# Patient Record
Sex: MALE | Race: WHITE | Employment: OTHER | URBAN - METROPOLITAN AREA
[De-identification: names, ages, dates, MRNs, and addresses within clinical notes are randomized per-mention and may not be internally consistent; named-entity substitution may affect disease eponyms.]

---

## 2018-06-06 PROBLEM — R80.1 PERSISTENT PROTEINURIA: Status: ACTIVE | Noted: 2018-06-06

## 2018-06-06 PROBLEM — E78.00 PURE HYPERCHOLESTEROLEMIA: Status: ACTIVE | Noted: 2018-06-06

## 2018-06-06 PROBLEM — E87.6 HYPOKALEMIA: Status: ACTIVE | Noted: 2018-06-06

## 2018-06-06 PROBLEM — N18.30 CHRONIC KIDNEY DISEASE, STAGE III (MODERATE) (HCC): Status: ACTIVE | Noted: 2018-06-06

## 2018-06-06 PROBLEM — I63.9 CEREBROVASCULAR ACCIDENT (CVA) DUE TO EMBOLISM (HCC): Status: ACTIVE | Noted: 2018-06-06

## 2018-06-06 PROBLEM — I10 ESSENTIAL HYPERTENSION: Status: ACTIVE | Noted: 2018-06-06

## 2018-06-06 PROBLEM — Z71.41 ALCOHOL ABUSE COUNSELING AND SURVEILLANCE: Status: ACTIVE | Noted: 2018-06-06

## 2018-06-06 PROBLEM — E11.9 TYPE 2 DIABETES MELLITUS WITHOUT COMPLICATION (HCC): Status: ACTIVE | Noted: 2018-06-06

## 2018-06-18 ENCOUNTER — HOSPITAL ENCOUNTER (OUTPATIENT)
Dept: ULTRASOUND IMAGING | Age: 68
Discharge: HOME | End: 2018-06-18
Attending: INTERNAL MEDICINE | Admitting: INTERNAL MEDICINE

## 2018-06-18 DIAGNOSIS — E13.9 DIABETES MELLITUS OF OTHER TYPE WITHOUT COMPLICATION, UNSPECIFIED LONG TERM INSULIN USE STATUS: ICD-10-CM

## 2018-06-18 DIAGNOSIS — Z71.41 ALCOHOL ABUSE COUNSELING AND SURVEILLANCE: ICD-10-CM

## 2018-06-18 DIAGNOSIS — N18.30 CHRONIC KIDNEY DISEASE, STAGE III (MODERATE) (HCC): ICD-10-CM

## 2018-06-18 DIAGNOSIS — R80.1 PERSISTENT PROTEINURIA: ICD-10-CM

## 2018-06-18 DIAGNOSIS — E87.6 HYPOKALEMIA: ICD-10-CM

## 2018-06-18 DIAGNOSIS — I10 ESSENTIAL HYPERTENSION, BENIGN: ICD-10-CM

## 2018-06-18 DIAGNOSIS — E78.00 PURE HYPERCHOLESTEROLEMIA: ICD-10-CM

## 2018-06-18 DIAGNOSIS — E11.9 DIABETES MELLITUS WITHOUT COMPLICATION (HCC): ICD-10-CM

## 2018-11-26 ENCOUNTER — HOSPITAL ENCOUNTER (OUTPATIENT)
Dept: ULTRASOUND IMAGING | Age: 68
Discharge: HOME | End: 2018-11-26
Payer: MEDICARE

## 2018-11-26 DIAGNOSIS — I63.81 LACUNAR INFARCTION (HCC): ICD-10-CM

## 2018-11-26 DIAGNOSIS — Z79.01 LONG TERM (CURRENT) USE OF ANTICOAGULANTS: ICD-10-CM

## 2018-11-26 DIAGNOSIS — I66.09: ICD-10-CM

## 2018-11-26 PROCEDURE — 93880 EXTRACRANIAL BILAT STUDY: CPT

## 2019-06-26 PROBLEM — E66.812 CLASS 2 SEVERE OBESITY DUE TO EXCESS CALORIES WITH SERIOUS COMORBIDITY AND BODY MASS INDEX (BMI) OF 39.0 TO 39.9 IN ADULT: Status: ACTIVE | Noted: 2019-06-26

## 2019-06-26 PROBLEM — E66.01 CLASS 2 SEVERE OBESITY DUE TO EXCESS CALORIES WITH SERIOUS COMORBIDITY AND BODY MASS INDEX (BMI) OF 39.0 TO 39.9 IN ADULT (HCC): Status: ACTIVE | Noted: 2019-06-26

## 2019-10-28 ENCOUNTER — APPOINTMENT (OUTPATIENT)
Dept: CT IMAGING | Age: 69
End: 2019-10-28
Payer: MEDICARE

## 2019-10-28 ENCOUNTER — HOSPITAL ENCOUNTER (EMERGENCY)
Age: 69
Discharge: HOME | End: 2019-10-28
Attending: EMERGENCY MEDICINE
Payer: MEDICARE

## 2019-10-28 VITALS
HEIGHT: 70 IN | SYSTOLIC BLOOD PRESSURE: 154 MMHG | TEMPERATURE: 98.6 F | OXYGEN SATURATION: 95 % | BODY MASS INDEX: 37.51 KG/M2 | DIASTOLIC BLOOD PRESSURE: 96 MMHG | HEART RATE: 87 BPM | WEIGHT: 262 LBS | RESPIRATION RATE: 17 BRPM
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
## 2019-10-28 DIAGNOSIS — S16.1XXA STRAIN OF NECK MUSCLE, INITIAL ENCOUNTER: ICD-10-CM

## 2019-10-28 PROCEDURE — 6370000000 HC RX 637 (ALT 250 FOR IP)

## 2019-10-28 PROCEDURE — 72125 CT NECK SPINE W/O DYE: CPT

## 2019-10-28 PROCEDURE — 99284 EMERGENCY DEPT VISIT MOD MDM: CPT

## 2019-10-28 PROCEDURE — 6370000000 HC RX 637 (ALT 250 FOR IP): Performed by: EMERGENCY MEDICINE

## 2019-10-28 RX ORDER — ACETAMINOPHEN 500 MG
1000 TABLET ORAL ONCE
Status: COMPLETED | OUTPATIENT
Start: 2019-10-28 | End: 2019-10-28

## 2019-10-28 RX ORDER — ACETAMINOPHEN 500 MG
TABLET ORAL
Status: COMPLETED
Start: 2019-10-28 | End: 2019-10-28

## 2019-10-28 RX ADMIN — ACETAMINOPHEN 1000 MG: 500 TABLET ORAL at 12:00

## 2019-10-28 ASSESSMENT — PAIN SCALES - GENERAL: PAINLEVEL_OUTOF10: 9

## 2020-10-19 ENCOUNTER — HOSPITAL ENCOUNTER (OUTPATIENT)
Age: 70
Discharge: HOME | End: 2020-10-19
Payer: MEDICARE

## 2020-10-19 LAB
ALBUMIN SERPL-MCNC: 3.7 GM/DL (ref 3.4–5)
ANION GAP SERPL CALCULATED.3IONS-SCNC: 9 MMOL/L (ref 4–16)
BUN BLDV-MCNC: 21 MG/DL (ref 6–23)
CALCIUM SERPL-MCNC: 9 MG/DL (ref 8.3–10.6)
CHLORIDE BLD-SCNC: 90 MMOL/L (ref 99–110)
CO2: 36 MMOL/L (ref 21–32)
CREAT SERPL-MCNC: 2 MG/DL (ref 0.9–1.3)
CREATININE URINE: 74.9 MG/DL (ref 39–259)
GFR AFRICAN AMERICAN: 40 ML/MIN/1.73M2
GFR NON-AFRICAN AMERICAN: 33 ML/MIN/1.73M2
GLUCOSE BLD-MCNC: 123 MG/DL (ref 70–99)
MAGNESIUM: 2 MG/DL (ref 1.8–2.4)
PHOSPHORUS: 2.8 MG/DL (ref 2.5–4.9)
POTASSIUM SERPL-SCNC: 3.5 MMOL/L (ref 3.5–5.1)
PROT/CREAT RATIO, UR: 0.4
SODIUM BLD-SCNC: 135 MMOL/L (ref 135–145)
URINE TOTAL PROTEIN: 31.7 MG/DL

## 2020-10-19 PROCEDURE — 84100 ASSAY OF PHOSPHORUS: CPT

## 2020-10-19 PROCEDURE — 82570 ASSAY OF URINE CREATININE: CPT

## 2020-10-19 PROCEDURE — 83735 ASSAY OF MAGNESIUM: CPT

## 2020-10-19 PROCEDURE — 84156 ASSAY OF PROTEIN URINE: CPT

## 2020-10-19 PROCEDURE — 82040 ASSAY OF SERUM ALBUMIN: CPT

## 2020-10-19 PROCEDURE — 36415 COLL VENOUS BLD VENIPUNCTURE: CPT

## 2020-10-19 PROCEDURE — 80048 BASIC METABOLIC PNL TOTAL CA: CPT

## 2021-03-13 ENCOUNTER — HOSPITAL ENCOUNTER (EMERGENCY)
Age: 71
Discharge: ANOTHER HOSPITAL | End: 2021-03-13
Attending: EMERGENCY MEDICINE
Payer: MEDICARE

## 2021-03-13 ENCOUNTER — APPOINTMENT (OUTPATIENT)
Dept: CT IMAGING | Age: 71
End: 2021-03-13
Payer: MEDICARE

## 2021-03-13 ENCOUNTER — APPOINTMENT (OUTPATIENT)
Dept: GENERAL RADIOLOGY | Age: 71
End: 2021-03-13
Payer: MEDICARE

## 2021-03-13 VITALS
DIASTOLIC BLOOD PRESSURE: 82 MMHG | HEART RATE: 86 BPM | BODY MASS INDEX: 40.88 KG/M2 | TEMPERATURE: 98.3 F | HEIGHT: 69 IN | WEIGHT: 276 LBS | SYSTOLIC BLOOD PRESSURE: 147 MMHG | OXYGEN SATURATION: 98 % | RESPIRATION RATE: 25 BRPM

## 2021-03-13 DIAGNOSIS — R93.89 ABNORMAL CT SCAN: ICD-10-CM

## 2021-03-13 DIAGNOSIS — R09.02 HYPOXIA: ICD-10-CM

## 2021-03-13 DIAGNOSIS — R77.8 TROPONIN LEVEL ELEVATED: ICD-10-CM

## 2021-03-13 DIAGNOSIS — R18.8 CIRRHOSIS OF LIVER WITH ASCITES, UNSPECIFIED HEPATIC CIRRHOSIS TYPE (HCC): ICD-10-CM

## 2021-03-13 DIAGNOSIS — K80.20 CALCULUS OF GALLBLADDER WITHOUT CHOLECYSTITIS WITHOUT OBSTRUCTION: ICD-10-CM

## 2021-03-13 DIAGNOSIS — R06.89 DYSPNEA AND RESPIRATORY ABNORMALITIES: ICD-10-CM

## 2021-03-13 DIAGNOSIS — R79.89 ELEVATED BRAIN NATRIURETIC PEPTIDE (BNP) LEVEL: ICD-10-CM

## 2021-03-13 DIAGNOSIS — R06.00 DYSPNEA AND RESPIRATORY ABNORMALITIES: ICD-10-CM

## 2021-03-13 DIAGNOSIS — K74.60 CIRRHOSIS OF LIVER WITH ASCITES, UNSPECIFIED HEPATIC CIRRHOSIS TYPE (HCC): ICD-10-CM

## 2021-03-13 DIAGNOSIS — R79.1 SUBTHERAPEUTIC INTERNATIONAL NORMALIZED RATIO (INR): ICD-10-CM

## 2021-03-13 DIAGNOSIS — R14.0 ABDOMINAL DISTENSION: ICD-10-CM

## 2021-03-13 DIAGNOSIS — M79.89 LEG SWELLING: Primary | ICD-10-CM

## 2021-03-13 DIAGNOSIS — R79.89 SERUM CREATININE RAISED: ICD-10-CM

## 2021-03-13 DIAGNOSIS — J90 PLEURAL EFFUSION: ICD-10-CM

## 2021-03-13 DIAGNOSIS — I50.9 CONGESTIVE HEART FAILURE, UNSPECIFIED HF CHRONICITY, UNSPECIFIED HEART FAILURE TYPE (HCC): ICD-10-CM

## 2021-03-13 LAB
ALBUMIN SERPL-MCNC: 3.9 GM/DL (ref 3.4–5)
ALP BLD-CCNC: 125 IU/L (ref 40–129)
ALT SERPL-CCNC: 19 U/L (ref 10–40)
ANION GAP SERPL CALCULATED.3IONS-SCNC: 11 MMOL/L (ref 4–16)
APTT: 40.2 SECONDS (ref 25.1–37.1)
AST SERPL-CCNC: 35 IU/L (ref 15–37)
BACTERIA: NEGATIVE /HPF
BASE EXCESS MIXED: 0.6 (ref 0–1.2)
BASE EXCESS: ABNORMAL (ref 0–3.3)
BASOPHILS ABSOLUTE: 0 K/CU MM
BASOPHILS RELATIVE PERCENT: 0.4 % (ref 0–1)
BILIRUB SERPL-MCNC: 1.1 MG/DL (ref 0–1)
BILIRUBIN URINE: NEGATIVE MG/DL
BLOOD, URINE: NEGATIVE
BUN BLDV-MCNC: 16 MG/DL (ref 6–23)
CALCIUM SERPL-MCNC: 9.1 MG/DL (ref 8.3–10.6)
CAST TYPE: NORMAL /HPF
CHLORIDE BLD-SCNC: 100 MMOL/L (ref 99–110)
CLARITY: CLEAR
CO2: 27 MMOL/L (ref 21–32)
CO2: 28 MMOL/L (ref 21–32)
COLOR: YELLOW
CREAT SERPL-MCNC: 1.6 MG/DL (ref 0.9–1.3)
CRYSTAL TYPE: NEGATIVE /HPF
DIFFERENTIAL TYPE: ABNORMAL
EOSINOPHILS ABSOLUTE: 0.2 K/CU MM
EOSINOPHILS RELATIVE PERCENT: 1.5 % (ref 0–3)
EPITHELIAL CELLS, UA: 2 /HPF
GFR AFRICAN AMERICAN: 52 ML/MIN/1.73M2
GFR NON-AFRICAN AMERICAN: 43 ML/MIN/1.73M2
GLUCOSE BLD-MCNC: 111 MG/DL (ref 70–99)
GLUCOSE BLD-MCNC: 112 MG/DL (ref 70–99)
GLUCOSE, URINE: NEGATIVE MG/DL
HCO3 ARTERIAL: 26.1 MMOL/L (ref 18–23)
HCT VFR BLD CALC: 40.1 % (ref 42–52)
HCT VFR BLD CALC: 41 % (ref 42–52)
HEMOGLOBIN: 12.7 GM/DL (ref 13.5–18)
HEMOGLOBIN: 13.8 GM/DL (ref 13.5–18)
HYPOCHROMIA: ABNORMAL
IMMATURE NEUTROPHIL %: 0.4 % (ref 0–0.43)
INR BLD: 1.27 INDEX
KETONES, URINE: NEGATIVE MG/DL
LACTATE: 1.9 MMOL/L (ref 0.4–2)
LEUKOCYTE ESTERASE, URINE: NEGATIVE
LIPASE: 60 IU/L (ref 13–60)
LYMPHOCYTES ABSOLUTE: 2.7 K/CU MM
LYMPHOCYTES RELATIVE PERCENT: 25.6 % (ref 24–44)
MAGNESIUM: 1.8 MG/DL (ref 1.8–2.4)
MCH RBC QN AUTO: 34.9 PG (ref 27–31)
MCHC RBC AUTO-ENTMCNC: 31.7 % (ref 32–36)
MCV RBC AUTO: 110.2 FL (ref 78–100)
MONOCYTES ABSOLUTE: 0.8 K/CU MM
MONOCYTES RELATIVE PERCENT: 8 % (ref 0–4)
NITRITE URINE, QUANTITATIVE: NEGATIVE
O2 SATURATION: 85.9 % (ref 96–97)
PCO2 ARTERIAL: 49.7 MMHG (ref 32–45)
PDW BLD-RTO: 16.7 % (ref 11.7–14.9)
PH BLOOD: 7.33 (ref 7.34–7.45)
PH, URINE: 5 (ref 5–8)
PLATELET # BLD: 189 K/CU MM (ref 140–440)
PMV BLD AUTO: 9.6 FL (ref 7.5–11.1)
PO2 ARTERIAL: 55.6 MMHG (ref 75–100)
POC CALCIUM: 1.1 MMOL/L (ref 1.12–1.32)
POC CHLORIDE: 99 MMOL/L (ref 98–109)
POC CREATININE: 1.6 MG/DL (ref 0.9–1.3)
POLYCHROMASIA: ABNORMAL
POTASSIUM SERPL-SCNC: 3.9 MMOL/L (ref 3.5–4.5)
POTASSIUM SERPL-SCNC: 4 MMOL/L (ref 3.5–5.1)
PRO-BNP: 1002 PG/ML
PROTEIN UA: NEGATIVE MG/DL
PROTHROMBIN TIME: 15.7 SECONDS (ref 11.7–14.5)
RBC # BLD: 3.64 M/CU MM (ref 4.6–6.2)
RBC URINE: 0 /HPF (ref 0–3)
SEGMENTED NEUTROPHILS ABSOLUTE COUNT: 6.9 K/CU MM
SEGMENTED NEUTROPHILS RELATIVE PERCENT: 64.1 % (ref 36–66)
SODIUM BLD-SCNC: 138 MMOL/L (ref 135–145)
SODIUM BLD-SCNC: 140 MMOL/L (ref 138–146)
SOURCE, BLOOD GAS: ABNORMAL
SPECIFIC GRAVITY UA: 1.01 (ref 1–1.03)
TOTAL IMMATURE NEUTOROPHIL: 0.04 K/CU MM
TOTAL PROTEIN: 8 GM/DL (ref 6.4–8.2)
TROPONIN T: 0.03 NG/ML
TSH HIGH SENSITIVITY: 1.3 UIU/ML (ref 0.27–4.2)
UROBILINOGEN, URINE: 0.2 MG/DL (ref 0.2–1)
WBC # BLD: 10.6 K/CU MM (ref 4–10.5)
WBC UA: 1 /HPF (ref 0–2)

## 2021-03-13 PROCEDURE — 84484 ASSAY OF TROPONIN QUANT: CPT

## 2021-03-13 PROCEDURE — 71275 CT ANGIOGRAPHY CHEST: CPT

## 2021-03-13 PROCEDURE — 82805 BLOOD GASES W/O2 SATURATION: CPT

## 2021-03-13 PROCEDURE — 84443 ASSAY THYROID STIM HORMONE: CPT

## 2021-03-13 PROCEDURE — 87086 URINE CULTURE/COLONY COUNT: CPT

## 2021-03-13 PROCEDURE — 6360000002 HC RX W HCPCS: Performed by: EMERGENCY MEDICINE

## 2021-03-13 PROCEDURE — 83880 ASSAY OF NATRIURETIC PEPTIDE: CPT

## 2021-03-13 PROCEDURE — 87186 SC STD MICRODIL/AGAR DIL: CPT

## 2021-03-13 PROCEDURE — 83605 ASSAY OF LACTIC ACID: CPT

## 2021-03-13 PROCEDURE — 96374 THER/PROPH/DIAG INJ IV PUSH: CPT

## 2021-03-13 PROCEDURE — 83735 ASSAY OF MAGNESIUM: CPT

## 2021-03-13 PROCEDURE — 87077 CULTURE AEROBIC IDENTIFY: CPT

## 2021-03-13 PROCEDURE — 81001 URINALYSIS AUTO W/SCOPE: CPT

## 2021-03-13 PROCEDURE — 71045 X-RAY EXAM CHEST 1 VIEW: CPT

## 2021-03-13 PROCEDURE — 93005 ELECTROCARDIOGRAM TRACING: CPT | Performed by: EMERGENCY MEDICINE

## 2021-03-13 PROCEDURE — 87040 BLOOD CULTURE FOR BACTERIA: CPT

## 2021-03-13 PROCEDURE — 85610 PROTHROMBIN TIME: CPT

## 2021-03-13 PROCEDURE — 6360000004 HC RX CONTRAST MEDICATION: Performed by: EMERGENCY MEDICINE

## 2021-03-13 PROCEDURE — 85025 COMPLETE CBC W/AUTO DIFF WBC: CPT

## 2021-03-13 PROCEDURE — 93010 ELECTROCARDIOGRAM REPORT: CPT | Performed by: INTERNAL MEDICINE

## 2021-03-13 PROCEDURE — 85730 THROMBOPLASTIN TIME PARTIAL: CPT

## 2021-03-13 PROCEDURE — 80053 COMPREHEN METABOLIC PANEL: CPT

## 2021-03-13 PROCEDURE — 83690 ASSAY OF LIPASE: CPT

## 2021-03-13 PROCEDURE — 99285 EMERGENCY DEPT VISIT HI MDM: CPT

## 2021-03-13 RX ORDER — FUROSEMIDE 10 MG/ML
20 INJECTION INTRAMUSCULAR; INTRAVENOUS ONCE
Status: COMPLETED | OUTPATIENT
Start: 2021-03-13 | End: 2021-03-13

## 2021-03-13 RX ADMIN — IOPAMIDOL 75 ML: 755 INJECTION, SOLUTION INTRAVENOUS at 14:06

## 2021-03-13 RX ADMIN — FUROSEMIDE 20 MG: 10 INJECTION, SOLUTION INTRAVENOUS at 15:11

## 2021-03-13 ASSESSMENT — PAIN SCALES - GENERAL: PAINLEVEL_OUTOF10: 5

## 2021-03-13 ASSESSMENT — ENCOUNTER SYMPTOMS
ABDOMINAL DISTENTION: 1
ALLERGIC/IMMUNOLOGIC NEGATIVE: 1
COUGH: 1
EYES NEGATIVE: 1
DIARRHEA: 1
SHORTNESS OF BREATH: 1

## 2021-03-13 ASSESSMENT — PAIN DESCRIPTION - LOCATION: LOCATION: ABDOMEN

## 2021-03-13 NOTE — ED PROVIDER NOTES
[address] [facility] [address]      TRIAGE CHIEF COMPLAINT:   Shortness of Breath and Leg Swelling (BILAT 4-5 DAYS)      [place]:  Geno Hope is a 72 y.o. male that presents with complaint of shortness of breath peripheral edema abdominal distention. Patient's had worsening symptoms for the past few months. Came in today he is hypoxic requiring oxygen. History of strokes he is on Coumadin. Denies any chest pain or abdominal pain just worsening distention bilateral leg swelling short of breath congestion. History of obesity, hypertension, diabetes type 2, chronic kidney disease stage III. Has had decreased urination as well. Follows up with nephrology, urology for kidney, prostate issues. Denies any other questions or concerns. No history of CHF or cirrhosis he states. He was a heavy drinker for a long time recently quit. REVIEW OF SYSTEMS:  At least 10 systems reviewed and otherwise acutely negative except as in the [address] Review of Systems   Constitutional: Positive for activity change and fatigue. HENT: Negative. Eyes: Negative. Respiratory: Positive for cough and shortness of breath. Cardiovascular: Positive for leg swelling. Gastrointestinal: Positive for abdominal distention and diarrhea. Endocrine: Negative. Genitourinary: Positive for decreased urine volume and difficulty urinating. Musculoskeletal: Negative. Skin: Negative. Allergic/Immunologic: Negative. Neurological: Negative. Hematological: Negative. Psychiatric/Behavioral: Negative. All other systems reviewed and are negative.       Past Medical History:   Diagnosis Date    Diabetes mellitus ([address]     Hypertension     Unspecified cerebral artery occlusion with cerebral infarction      Past Surgical History:   Procedure Laterality Date    ACHILLES TENDON SURGERY       Family History   Problem Relation Age of Onset    Diabetes Mother     Cancer Mother     Heart Disease Father    Tobi Cancer Father      Social History     Socioeconomic History    Marital status:      Spouse name: Not on file    Number of children: Not on file    Years of education: Not on file    Highest education level: Not on file   Occupational History    Not on file   Social Needs    Financial resource strain: Not on file    Food insecurity     Worry: Not on file     Inability: Not on file    Transportation needs     Medical: Not on file     Non-medical: Not on file   Tobacco Use    Smoking status: Never Smoker    Smokeless tobacco: Never Used   Substance and Sexual Activity    Alcohol use:  Yes     Alcohol/week: 2.0 standard drinks     Types: 2 Glasses of wine per week    Drug use: No    Sexual activity: Yes     Partners: Female   Lifestyle    Physical activity     Days per week: Not on file     Minutes per session: Not on file    Stress: Not on file   Relationships    Social connections     Talks on phone: Not on file     Gets together: Not on file     Attends Jewish service: Not on file     Active member of club or organization: Not on file     Attends meetings of clubs or organizations: Not on file     Relationship status: Not on file    Intimate partner violence     Fear of current or ex partner: Not on file     Emotionally abused: Not on file     Physically abused: Not on file     Forced sexual activity: Not on file   Other Topics Concern    Not on file   Social History Narrative    Not on file     Current Facility-Administered Medications   Medication Dose Route Frequency Provider Last Rate Last Admin    vancomycin (VANCOCIN) 2,000 mg in dextrose 5 % 500 mL IVPB  2,000 mg Intravenous Once Alannahdicalvin Pete, DO        meropenem (MERREM) 1,000 mg in sodium chloride 0.9 % 100 mL IVPB (mini-bag)  1,000 mg Intravenous Q8H Kishore Pete, DO         Current Outpatient Medications   Medication Sig Dispense Refill    furosemide (LASIX) 20 MG tablet Take 1 tablet by mouth daily 30 tablet 3    furosemide (LASIX) 20 MG tablet TAKE ONE TABLET BY MOUTH DAILY ON MONDAYS, WEDNESDAYS, AND FRIDAYS 36 tablet 3    venlafaxine (EFFEXOR) 75 MG tablet Take 75 mg by mouth daily      tamsulosin (FLOMAX) 0.4 MG capsule Take 0.4 mg by mouth daily      potassium chloride (KLOR-CON M) 10 MEQ extended release tablet Take 1 tablet by mouth 2 times daily for 5 days (Patient taking differently: Take 10 mEq by mouth 2 times daily as needed When takes wated pill) 10 tablet 3    glyBURIDE (DIABETA) 5 MG tablet Take 5 mg by mouth daily (with breakfast)      Coenzyme Q10 (CO Q 10) 100 MG CAPS Take 1 capsule by mouth daily      rosuvastatin (CRESTOR) 10 MG tablet Take 10 mg by mouth daily      naltrexone (DEPADE) 50 MG tablet Take 50 mg by mouth daily      albuterol sulfate  (90 Base) MCG/ACT inhaler Inhale 2 puffs into the lungs every 6 hours as needed for Wheezing      Cholecalciferol (VITAMIN D3) 2000 units CAPS Take 3 capsules by mouth daily      lisinopril-hydrochlorothiazide (ZESTORETIC) 20-25 MG per tablet Take 1 tablet by mouth daily      warfarin (COUMADIN) 5 MG tablet Take 5 mg by mouth 1/2 mon and thursdays      aspirin 81 MG EC tablet Take 81 mg by mouth daily.  fish oil-omega-3 fatty acids 1000 MG capsule Take 2 g by mouth daily.           Allergies   Allergen Reactions    Pcn [Penicillins] Other (See Comments)     \"freezing up\"    Strawberry Extract     Tetanus Toxoids     Tetracyclines & Related Hives     Current Facility-Administered Medications   Medication Dose Route Frequency Provider Last Rate Last Admin    vancomycin (VANCOCIN) 2,000 mg in dextrose 5 % 500 mL IVPB  2,000 mg Intravenous Once [employer], DO        meropenem (MERREM) 1,000 mg in sodium chloride 0.9 % 100 mL IVPB (mini-bag)  1,000 mg Intravenous Q8H [employer], DO         Current Outpatient Medications   Medication Sig Dispense Refill    furosemide (LASIX) 20 MG tablet Take 1 tablet by mouth daily 30 tablet External ear normal.      Left Ear: External ear normal.      Nose: Congestion present. No rhinorrhea. Eyes:      General: No scleral icterus. Right eye: No discharge. Left eye: No discharge. Extraocular Movements: Extraocular movements intact. Conjunctiva/sclera: Conjunctivae normal.   Neck:      Musculoskeletal: Full passive range of motion without pain and normal range of motion. Normal range of motion. No edema, erythema, neck rigidity, crepitus, injury, pain with movement or torticollis. Vascular: No JVD. Trachea: Phonation normal.   Cardiovascular:      Rate and Rhythm: Regular rhythm. Tachycardia present. Pulses: Normal pulses. Heart sounds: Normal heart sounds. No murmur. No friction rub. No gallop. Pulmonary:      Effort: Pulmonary effort is normal. No respiratory distress. Breath sounds: No stridor. Rales present. No wheezing or rhonchi. Abdominal:      General: Bowel sounds are normal. There is distension. There are no signs of injury. Palpations: Abdomen is soft. There is no mass or pulsatile mass. Tenderness: There is no abdominal tenderness. There is no guarding or rebound. Negative signs include Salamanca's sign, Rovsing's sign and McBurney's sign. Hernia: No hernia is present. Musculoskeletal: Normal range of motion. General: Swelling present. No tenderness, deformity or signs of injury. Right lower leg: Edema present. Left lower leg: Edema present. Skin:     General: Skin is warm. Coloration: Skin is not jaundiced or pale. Findings: No bruising, erythema, lesion or rash. Neurological:      General: No focal deficit present. Mental Status: He is alert and oriented to person, place, and time. GCS: GCS eye subscore is 4. GCS verbal subscore is 5. GCS motor subscore is 6. Cranial Nerves: Cranial nerves are intact. No cranial nerve deficit, dysarthria or facial asymmetry.       Sensory: Sensation is intact. No sensory deficit. Motor: Motor function is intact. No weakness, atrophy or seizure activity. Coordination: Coordination normal.   Psychiatric:         Mood and Affect: Mood normal.         Behavior: Behavior normal. Behavior is cooperative. Thought Content:  Thought content normal.         Judgment: Judgment normal.           I have reviewed andinterpreted all of the currently available lab results from this visit (if applicable):    Results for orders placed or performed during the hospital encounter of 03/13/21   CBC Auto Differential   Result Value Ref Range    WBC 10.6 (H) 4.0 - 10.5 K/CU MM    RBC 3.64 (L) 4.6 - 6.2 M/CU MM    Hemoglobin 12.7 (L) 13.5 - 18.0 GM/DL    Hematocrit 40.1 (L) 42 - 52 %    .2 (H) 78 - 100 FL    MCH 34.9 (H) 27 - 31 PG    MCHC 31.7 (L) 32.0 - 36.0 %    RDW 16.7 (H) 11.7 - 14.9 %    Platelets 423 678 - 639 K/CU MM    MPV 9.6 7.5 - 11.1 FL    Differential Type AUTOMATED DIFFERENTIAL     Segs Relative 64.1 36 - 66 %    Lymphocytes % 25.6 24 - 44 %    Monocytes % 8.0 (H) 0 - 4 %    Eosinophils % 1.5 0 - 3 %    Basophils % 0.4 0 - 1 %    Segs Absolute 6.9 K/CU MM    Lymphocytes Absolute 2.7 K/CU MM    Monocytes Absolute 0.8 K/CU MM    Eosinophils Absolute 0.2 K/CU MM    Basophils Absolute 0.0 K/CU MM    Immature Neutrophil % 0.4 0 - 0.43 %    Total Immature Neutrophil 0.04 K/CU MM    Hypochromia 1+     Polychromasia 1+    CMP   Result Value Ref Range    Sodium 138 135 - 145 MMOL/L    Potassium 4.0 3.5 - 5.1 MMOL/L    Chloride 100 99 - 110 mMol/L    CO2 27 21 - 32 MMOL/L    BUN 16 6 - 23 MG/DL    CREATININE 1.6 (H) 0.9 - 1.3 MG/DL    Glucose 111 (H) 70 - 99 MG/DL    Calcium 9.1 8.3 - 10.6 MG/DL    Albumin 3.9 3.4 - 5.0 GM/DL    Total Protein 8.0 6.4 - 8.2 GM/DL    Total Bilirubin 1.1 (H) 0.0 - 1.0 MG/DL    ALT 19 10 - 40 U/L    AST 35 15 - 37 IU/L    Alkaline Phosphatase 125 40 - 129 IU/L    GFR Non- 43 (L) >60 mL/min/1.73m2 GFR African American 52 (L) >60 mL/min/1.73m2    Anion Gap 11 4 - 16   Troponin   Result Value Ref Range    Troponin T 0.029 (H) <0.01 NG/ML   Lactic Acid, Plasma   Result Value Ref Range    Lactate 1.9 0.4 - 2.0 mMOL/L   Protime/INR & PTT   Result Value Ref Range    Protime 15.7 (H) 11.7 - 14.5 SECONDS    INR 1.27 INDEX    aPTT 40.2 (H) 25.1 - 37.1 SECONDS   Brain Natriuretic Peptide   Result Value Ref Range    Pro-BNP 1,002 (H) <300 PG/ML   Lipase   Result Value Ref Range    Lipase 60 13 - 60 IU/L   TSH without Reflex   Result Value Ref Range    TSH, High Sensitivity 1.300 0.270 - 4.20 uIu/ml   Magnesium   Result Value Ref Range    Magnesium 1.8 1.8 - 2.4 mg/dl   Urinalysis   Result Value Ref Range    Color, UA YELLOW YELLOW    Clarity, UA CLEAR CLEAR    Glucose, Urine NEGATIVE NEGATIVE MG/DL    Bilirubin Urine NEGATIVE NEGATIVE MG/DL    Ketones, Urine NEGATIVE NEGATIVE MG/DL    Specific Gravity, UA 1.010 1.001 - 1.035    Blood, Urine NEGATIVE NEGATIVE    pH, Urine 5.0 5.0 - 8.0    Protein, UA NEGATIVE NEGATIVE MG/DL    Urobilinogen, Urine 0.2 0.2 - 1.0 MG/DL    Nitrite Urine, Quantitative NEGATIVE NEGATIVE    Leukocyte Esterase, Urine NEGATIVE NEGATIVE    RBC, UA 0 0 - 3 /HPF    WBC, UA 1 0 - 2 /HPF    Epithelial Cells, UA 2 /HPF    Cast Type NO CAST FORMS SEEN NO CAST FORMS SEEN /HPF    Bacteria, UA NEGATIVE NEGATIVE /HPF    Crystal Type NEGATIVE NEGATIVE /HPF   POC CRITICAL CARE PROFILE   Result Value Ref Range    pH, Bld 7.33 (L) 7.34 - 7.45    pCO2, Arterial 49.7 (H) 32 - 45 MMHG    pO2, Arterial 55.6 (L) 75 - 100 MMHG    HCO3, Arterial 26.1 (H) 18 - 23 MMOL/L    Base Excess MINUS 0 - 3.3    Base Exc, Mixed 0.6 0 - 1.2    O2 Sat 85.9 (L) 96 - 97 %    CO2 28 21 - 32 MMOL/L    Sodium 140 138 - 146 MMOL/L    Potassium 3.9 3.5 - 4.5 MMOL/L    POC CALCIUM 1.10 (L) 1.12 - 1.32 MMOL/L    POC Glucose 112 (H) 70 - 99 MG/DL    Hematocrit 41.0 (L) 42 - 52 %    Hemoglobin 13.8 13.5 - 18.0 GM/DL    POC Chloride 99 98 - 109 MMOL/L    POC Creatinine 1.6 (H) 0.9 - 1.3 MG/DL    Source: Venous    EKG 12 Lead   Result Value Ref Range    Ventricular Rate 92 BPM    Atrial Rate 92 BPM    P-R Interval 188 ms    QRS Duration 132 ms    Q-T Interval 388 ms    QTc Calculation (Bazett) 479 ms    P Axis 33 degrees    R Axis 54 degrees    T Axis 3 degrees    Diagnosis       Sinus rhythm with premature supraventricular complexes and with occasional premature ventricular complexes  Nonspecific intraventricular block  Nonspecific T wave abnormality  Abnormal ECG  No previous ECGs available  Confirmed by Richa Stiles MD, Zeus Ybarra ([id number]) on 3/13/2021 3:51:55 PM          Radiographs (if obtained):  [] The following radiograph was interpreted by myself in the absence of a radiologist:  [x] Radiologist's Report Reviewed:    CXR, CTA CAP    EKG (if obtained): (All EKG's are interpreted by myself in the absence of a cardiologist)    12 lead EKG per my interpretation:  Normal Sinus Rhythm 92 PVC's  Axis is   Normal  QTc is  479  There is no specific T wave changes appreciated. There is no specific ST wave changes appreciated. Prior EKG to compare with was not available     Total critical care time today provided was at least 38 minutes. This excludes seperately billable procedure. Critical care time provided for reviewing labs, images, giving oxygen, giving lasix consulting medicine that required close evaluation and/or intervention with concern for patient decompensation. MDM:    Patient here with worsening bilateral leg swelling shortness of breath abdominal distention for the last few months. Again he has a history of hypertension type 2 diabetes chronic kidney disease stage III had been drinking, enlarged prostate. He does follow with nephrology urology and medicine he is on Coumadin for history of stroke he is on lisinopril, Lasix all of which she has been taking.   On arrival he was tachycardic hypoxic 88% he is requiring oxygen which he does not home.  He was a heavy drinker recently quit he denies history of cirrhosis or ascites or CHF. Patient has no chest pain or abdominal pain just distention. Did perform work-up as he is ill his INR subtherapeutic so I am concerned about PE, CHF cirrhosis renal failure sepsis will get labs imaging culture patient will need admission he is preferring to go to [facility] for work-up of form patient does have ascites pleural effusions likely CHF subtherapeutic INR slight elevation troponin chronic kidney disease gallstones but no definite signs of cholecystitis possible cystitis. Patient likely has CHF I do not think he has pneumonia no white count no fever I do not think he has Covid I did wear 95 mask and eye protection. Patient be given Lasix for CHF he is doing better with oxygen his blood pressure has improved to 893 systolic by itself. He is requesting go to outside hospital I did talk to hospitalist at outside facility excepted transfer awaiting bed, transport. Otherwise patient stable. Also has cirrhosis. CLINICAL IMPRESSION:  Final diagnoses:   Leg swelling   Dyspnea and respiratory abnormalities   Abdominal distension   Hypoxia   Elevated brain natriuretic peptide (BNP) level   Serum creatinine raised   Troponin level elevated   Subtherapeutic international normalized ratio (INR)   Calculus of gallbladder without cholecystitis without obstruction   Abnormal CT scan   Cirrhosis of liver with ascites, unspecified hepatic cirrhosis type (HCC)   Congestive heart failure, unspecified HF chronicity, unspecified heart failure type (HCC)   Pleural effusion       (Please note that portions of this note may have been completed with a voice recognition program. Efforts were made to edit the dictations but occasionally words aremis-transcribed.)    DISPOSITION REFERRAL (if applicable):  No follow-up provider specified.     DISPOSITION MEDICATIONS (if applicable):  New Prescriptions    No medications on file Keila Ramos, DO Keila Ramos,   03/13/21 [id number]

## 2021-03-15 LAB
CULTURE: ABNORMAL
CULTURE: ABNORMAL
Lab: ABNORMAL
SPECIMEN: ABNORMAL

## 2021-03-16 LAB
EKG ATRIAL RATE: 92 BPM
EKG DIAGNOSIS: NORMAL
EKG P AXIS: 33 DEGREES
EKG P-R INTERVAL: 188 MS
EKG Q-T INTERVAL: 388 MS
EKG QRS DURATION: 132 MS
EKG QTC CALCULATION (BAZETT): 479 MS
EKG R AXIS: 54 DEGREES
EKG T AXIS: 3 DEGREES
EKG VENTRICULAR RATE: 92 BPM

## 2021-03-18 LAB
CULTURE: NORMAL
CULTURE: NORMAL
Lab: NORMAL
Lab: NORMAL
SPECIMEN: NORMAL
SPECIMEN: NORMAL

## 2021-03-22 ENCOUNTER — HOSPITAL ENCOUNTER (EMERGENCY)
Age: 71
Discharge: ANOTHER HOSPITAL | End: 2021-03-22
Attending: EMERGENCY MEDICINE
Payer: MEDICARE

## 2021-03-22 ENCOUNTER — APPOINTMENT (OUTPATIENT)
Dept: GENERAL RADIOLOGY | Age: 71
End: 2021-03-22
Payer: MEDICARE

## 2021-03-22 VITALS
OXYGEN SATURATION: 97 % | WEIGHT: 270 LBS | HEART RATE: 98 BPM | TEMPERATURE: 98 F | HEIGHT: 69 IN | BODY MASS INDEX: 39.99 KG/M2 | RESPIRATION RATE: 31 BRPM | DIASTOLIC BLOOD PRESSURE: 59 MMHG | SYSTOLIC BLOOD PRESSURE: 102 MMHG

## 2021-03-22 DIAGNOSIS — N17.9 AKI (ACUTE KIDNEY INJURY) (HCC): ICD-10-CM

## 2021-03-22 DIAGNOSIS — J96.01 ACUTE RESPIRATORY FAILURE WITH HYPOXIA (HCC): Primary | ICD-10-CM

## 2021-03-22 DIAGNOSIS — I51.7 CARDIOMEGALY: ICD-10-CM

## 2021-03-22 DIAGNOSIS — E87.6 HYPOKALEMIA: ICD-10-CM

## 2021-03-22 DIAGNOSIS — I21.4 NSTEMI (NON-ST ELEVATED MYOCARDIAL INFARCTION) (HCC): ICD-10-CM

## 2021-03-22 DIAGNOSIS — I50.9 ACUTE ON CHRONIC CONGESTIVE HEART FAILURE, UNSPECIFIED HEART FAILURE TYPE (HCC): ICD-10-CM

## 2021-03-22 LAB
ALBUMIN SERPL-MCNC: 3.5 GM/DL (ref 3.4–5)
ALP BLD-CCNC: 96 IU/L (ref 40–129)
ALT SERPL-CCNC: 12 U/L (ref 10–40)
ANION GAP SERPL CALCULATED.3IONS-SCNC: 14 MMOL/L (ref 4–16)
APTT: 36.3 SECONDS (ref 25.1–37.1)
AST SERPL-CCNC: 31 IU/L (ref 15–37)
BASOPHILS ABSOLUTE: 0 K/CU MM
BASOPHILS RELATIVE PERCENT: 0.2 % (ref 0–1)
BILIRUB SERPL-MCNC: 0.7 MG/DL (ref 0–1)
BUN BLDV-MCNC: 20 MG/DL (ref 6–23)
CALCIUM SERPL-MCNC: 8.8 MG/DL (ref 8.3–10.6)
CHLORIDE BLD-SCNC: 79 MMOL/L (ref 99–110)
CHP ED QC CHECK: YES
CO2: 33 MMOL/L (ref 21–32)
CREAT SERPL-MCNC: 3.2 MG/DL (ref 0.9–1.3)
DIFFERENTIAL TYPE: ABNORMAL
EOSINOPHILS ABSOLUTE: 0.1 K/CU MM
EOSINOPHILS RELATIVE PERCENT: 0.7 % (ref 0–3)
GFR AFRICAN AMERICAN: 23 ML/MIN/1.73M2
GFR NON-AFRICAN AMERICAN: 19 ML/MIN/1.73M2
GLUCOSE BLD-MCNC: 65 MG/DL
GLUCOSE BLD-MCNC: 65 MG/DL (ref 70–99)
GLUCOSE BLD-MCNC: 70 MG/DL (ref 70–99)
HCT VFR BLD CALC: 35.1 % (ref 42–52)
HEMOGLOBIN: 12 GM/DL (ref 13.5–18)
IMMATURE NEUTROPHIL %: 0.3 % (ref 0–0.43)
INR BLD: 1.09 INDEX
LYMPHOCYTES ABSOLUTE: 3.1 K/CU MM
LYMPHOCYTES RELATIVE PERCENT: 25 % (ref 24–44)
MAGNESIUM: 1.9 MG/DL (ref 1.8–2.4)
MCH RBC QN AUTO: 35.3 PG (ref 27–31)
MCHC RBC AUTO-ENTMCNC: 34.2 % (ref 32–36)
MCV RBC AUTO: 103.2 FL (ref 78–100)
MONOCYTES ABSOLUTE: 1.1 K/CU MM
MONOCYTES RELATIVE PERCENT: 8.5 % (ref 0–4)
PDW BLD-RTO: 15.1 % (ref 11.7–14.9)
PLATELET # BLD: 206 K/CU MM (ref 140–440)
PMV BLD AUTO: 10.4 FL (ref 7.5–11.1)
POTASSIUM SERPL-SCNC: 2.5 MMOL/L (ref 3.5–5.1)
PRO-BNP: 477.5 PG/ML
PROTHROMBIN TIME: 14 SECONDS (ref 11.7–14.5)
RBC # BLD: 3.4 M/CU MM (ref 4.6–6.2)
SEGMENTED NEUTROPHILS ABSOLUTE COUNT: 8.2 K/CU MM
SEGMENTED NEUTROPHILS RELATIVE PERCENT: 65.3 % (ref 36–66)
SODIUM BLD-SCNC: 126 MMOL/L (ref 135–145)
TOTAL IMMATURE NEUTOROPHIL: 0.04 K/CU MM
TOTAL PROTEIN: 7.3 GM/DL (ref 6.4–8.2)
TROPONIN T: 0.04 NG/ML
WBC # BLD: 12.5 K/CU MM (ref 4–10.5)

## 2021-03-22 PROCEDURE — 84484 ASSAY OF TROPONIN QUANT: CPT

## 2021-03-22 PROCEDURE — 80053 COMPREHEN METABOLIC PANEL: CPT

## 2021-03-22 PROCEDURE — 71046 X-RAY EXAM CHEST 2 VIEWS: CPT

## 2021-03-22 PROCEDURE — 83880 ASSAY OF NATRIURETIC PEPTIDE: CPT

## 2021-03-22 PROCEDURE — 83735 ASSAY OF MAGNESIUM: CPT

## 2021-03-22 PROCEDURE — 6360000002 HC RX W HCPCS: Performed by: EMERGENCY MEDICINE

## 2021-03-22 PROCEDURE — 96365 THER/PROPH/DIAG IV INF INIT: CPT

## 2021-03-22 PROCEDURE — 99285 EMERGENCY DEPT VISIT HI MDM: CPT

## 2021-03-22 PROCEDURE — 82962 GLUCOSE BLOOD TEST: CPT

## 2021-03-22 PROCEDURE — 96367 TX/PROPH/DG ADDL SEQ IV INF: CPT

## 2021-03-22 PROCEDURE — 85730 THROMBOPLASTIN TIME PARTIAL: CPT

## 2021-03-22 PROCEDURE — 96361 HYDRATE IV INFUSION ADD-ON: CPT

## 2021-03-22 PROCEDURE — 85610 PROTHROMBIN TIME: CPT

## 2021-03-22 PROCEDURE — 2580000003 HC RX 258: Performed by: EMERGENCY MEDICINE

## 2021-03-22 PROCEDURE — 85025 COMPLETE CBC W/AUTO DIFF WBC: CPT

## 2021-03-22 PROCEDURE — 93005 ELECTROCARDIOGRAM TRACING: CPT | Performed by: EMERGENCY MEDICINE

## 2021-03-22 RX ORDER — POTASSIUM CHLORIDE 20 MEQ/1
40 TABLET, EXTENDED RELEASE ORAL PRN
Status: DISCONTINUED | OUTPATIENT
Start: 2021-03-22 | End: 2021-03-22

## 2021-03-22 RX ORDER — POTASSIUM CHLORIDE 7.45 MG/ML
10 INJECTION INTRAVENOUS PRN
Status: DISCONTINUED | OUTPATIENT
Start: 2021-03-22 | End: 2021-03-23 | Stop reason: HOSPADM

## 2021-03-22 RX ORDER — MAGNESIUM SULFATE IN WATER 40 MG/ML
2000 INJECTION, SOLUTION INTRAVENOUS ONCE
Status: COMPLETED | OUTPATIENT
Start: 2021-03-22 | End: 2021-03-22

## 2021-03-22 RX ORDER — 0.9 % SODIUM CHLORIDE 0.9 %
500 INTRAVENOUS SOLUTION INTRAVENOUS ONCE
Status: COMPLETED | OUTPATIENT
Start: 2021-03-22 | End: 2021-03-22

## 2021-03-22 RX ADMIN — POTASSIUM CHLORIDE 10 MEQ: 7.46 INJECTION, SOLUTION INTRAVENOUS at 21:28

## 2021-03-22 RX ADMIN — MAGNESIUM SULFATE HEPTAHYDRATE 2000 MG: 40 INJECTION, SOLUTION INTRAVENOUS at 22:44

## 2021-03-22 RX ADMIN — SODIUM CHLORIDE 500 ML: 9 INJECTION, SOLUTION INTRAVENOUS at 20:35

## 2021-03-23 PROCEDURE — 93010 ELECTROCARDIOGRAM REPORT: CPT | Performed by: INTERNAL MEDICINE

## 2021-03-23 NOTE — ED PROVIDER NOTES
Emergency Department Encounter    Patient: Justice Shearer  MRN: [id number]  : 1950  Date of Evaluation: 3/22/2021  ED Provider:  Celso Faulkner    Triage Chief Complaint:   Shortness of Breath      [place]:  Justice Shearer is a 72 y.o. male that presents to the emergency department for evaluation of shortness of breath. Patient presents via EMS and reports initially provided per paramedics who state that they were dispatched to the patient secondary to shortness of breath. When they arrived, patient was hypoxic. He does not require any supplemental oxygen at baseline, was placed on 3 L and transported to our hospital for evaluation. EMS notes that when he initially evaluated the patient, his systolic blood pressure was in the 140s. In route to the emergency department, systolic blood pressure went into the 70s. Upon arrival, patient does note recent admission at [facility] for clinical fluid overload and respiratory failure. He does admit to orthopnea and paroxysmal nocturnal dyspnea. No drooling, stridor, hoarseness, tripoding. No history of DVT or PE. Does have a dry, nonproductive cough. No hemoptysis. No fevers, chills, diaphoresis. No syncope, dizziness, lightheadedness. No abdominal pain, nausea, vomiting, diarrhea, constipation, hematochezia, melena. No additional precipitating, modifying, alleviating features. ROS - see HPI, below listed is current ROS at time of my eval:  A complete 14 point review of systems was performed and is as dictated above, otherwise negative.       Past Medical History:   Diagnosis Date    Diabetes mellitus ([address]     Hypertension     Unspecified cerebral artery occlusion with cerebral infarction        Past Surgical History:   Procedure Laterality Date    ACHILLES TENDON SURGERY         Family History   Problem Relation Age of Onset    Diabetes Mother     Cancer Mother     Heart Disease Father     Cancer Father        Social History Socioeconomic History    Marital status:      Spouse name: Not on file    Number of children: Not on file    Years of education: Not on file    Highest education level: Not on file   Occupational History    Not on file   Social Needs    Financial resource strain: Not on file    Food insecurity     Worry: Not on file     Inability: Not on file    Transportation needs     Medical: Not on file     Non-medical: Not on file   Tobacco Use    Smoking status: Never Smoker    Smokeless tobacco: Never Used   Substance and Sexual Activity    Alcohol use:  Yes     Alcohol/week: 2.0 standard drinks     Types: 2 Glasses of wine per week    Drug use: No    Sexual activity: Yes     Partners: Female   Lifestyle    Physical activity     Days per week: Not on file     Minutes per session: Not on file    Stress: Not on file   Relationships    Social connections     Talks on phone: Not on file     Gets together: Not on file     Attends Gnosticism service: Not on file     Active member of club or organization: Not on file     Attends meetings of clubs or organizations: Not on file     Relationship status: Not on file    Intimate partner violence     Fear of current or ex partner: Not on file     Emotionally abused: Not on file     Physically abused: Not on file     Forced sexual activity: Not on file   Other Topics Concern    Not on file   Social History Narrative    Not on file       Current Facility-Administered Medications   Medication Dose Route Frequency Provider Last Rate Last Admin    potassium bicarb-citric acid (EFFER-K) effervescent tablet 40 mEq  40 mEq Oral PRN Andrew Erlinda Renato, DO        Or    potassium chloride 10 mEq/100 mL IVPB (Peripheral Line)  10 mEq Intravenous PRN Andrew S Bryon Ast,  mL/hr at 03/22/21 2233 10 mEq at 03/22/21 2233    magnesium sulfate 2000 mg in 50 mL IVPB premix  2,000 mg Intravenous Once Andrew Erlinda Ernato, DO         Current Outpatient Medications   Medication Sig Dispense Refill    metOLazone (ZAROXOLYN) 2.5 MG tablet Take 1 tablet by mouth daily 30 tablet 3    furosemide (LASIX) 20 MG tablet Take 1 tablet by mouth daily 30 tablet 3    furosemide (LASIX) 20 MG tablet TAKE ONE TABLET BY MOUTH DAILY ON MONDAYS, WEDNESDAYS, AND FRIDAYS 36 tablet 3    venlafaxine (EFFEXOR) 75 MG tablet Take 75 mg by mouth daily      tamsulosin (FLOMAX) 0.4 MG capsule Take 0.4 mg by mouth daily      potassium chloride (KLOR-CON M) 10 MEQ extended release tablet Take 1 tablet by mouth 2 times daily for 5 days (Patient taking differently: Take 10 mEq by mouth 2 times daily as needed When takes wated pill) 10 tablet 3    glyBURIDE (DIABETA) 5 MG tablet Take 5 mg by mouth daily (with breakfast)      Coenzyme Q10 (CO Q 10) 100 MG CAPS Take 1 capsule by mouth daily      rosuvastatin (CRESTOR) 10 MG tablet Take 10 mg by mouth daily      naltrexone (DEPADE) 50 MG tablet Take 50 mg by mouth daily      albuterol sulfate  (90 Base) MCG/ACT inhaler Inhale 2 puffs into the lungs every 6 hours as needed for Wheezing      Cholecalciferol (VITAMIN D3) 2000 units CAPS Take 3 capsules by mouth daily      lisinopril-hydrochlorothiazide (ZESTORETIC) 20-25 MG per tablet Take 1 tablet by mouth daily      warfarin (COUMADIN) 5 MG tablet Take 5 mg by mouth 1/2 mon and thursdays      aspirin 81 MG EC tablet Take 81 mg by mouth daily.  fish oil-omega-3 fatty acids 1000 MG capsule Take 2 g by mouth daily.            Allergies   Allergen Reactions    Pcn [Penicillins] Other (See Comments)     \"freezing up\"    Strawberry Extract     Tetanus Toxoids     Tetracyclines & Related Hives         Nursing Notes Reviewed    Physical Exam:  Triage VS:    ED Triage Vitals [03/22/21 2033]   Enc Vitals Group      BP (!) 85/49      Pulse 88      Resp 20      Temp 98 °F (36.7 °C)      Temp Source Oral      SpO2 96 % on 3 L nasal cannula      Weight 270 lb (122.5 kg)      Height 5' 9\" (1.753 m)       My pulse ox interpretation is -hypoxic requiring 3 L supplemental oxygen via nasal cannula    General appearance: Patient is awake and alert. Following commands and answering questions. GCS is 15. No drooling, stridor, hoarseness, tripoding. Patient does appear to be in respiratory distress with 1-2 word conversational dyspnea. Skin:  Warm. Dry. Intact. No petechiae or purpura. No herpes zoster lesions. Eye: Pupils are equal, round, reactive. Extraocular movements are intact. No eyelid pallor. No nystagmus or gaze deviation. Head, ears, nose, mouth and throat: Head is normocephalic and atraumatic. There are no external masses or lesions. No nasal drainage. Pharynx is clear and non-erythematous. Airway is patent. No tonsillar enlargement. No tongue or lip edema. No uvular deviation. Neck: Supple. No nuchal rigidity. Trachea is midline. No masses or thyromegaly or lymphadenopathy. No JVD. No carotid thrills or bruits. No subcutaneous emphysema. Heart: Regular rate and sinus rhythm. Audible S1 and S2. No audible murmurs, rubs, gallops. Perfusion: Symmetric peripheral pulses. Brisk capillary refill. Respiratory: Breath sounds diminished bilaterally. There are rales and rhonchi in bilateral lung fields. There are mild chest and abdominal retractions with mild accessory muscle use. No flail segments or no signs of  chest trauma. Abdominal: Soft. Nondistended. No focal tenderness. No midline pulsatile abdominal masses, thrills, bruits. Extremity: No clubbing or cyanosis. +1 pitting edema in bilateral lower extremities. Patient has full muscle strength and good muscle tone in bilateral upper and lower extremities. Neurological:  Alert and oriented. No focal or lateralizing neurlogic deficits. Pysch: Cooperative.       I have reviewed and interpreted all of the currently available lab results from this visit:    Labs:  Results for orders placed or performed during the hospital encounter of 03/22/21   CBC Auto Differential   Result Value Ref Range    WBC 12.5 (H) 4.0 - 10.5 K/CU MM    RBC 3.40 (L) 4.6 - 6.2 M/CU MM    Hemoglobin 12.0 (L) 13.5 - 18.0 GM/DL    Hematocrit 35.1 (L) 42 - 52 %    .2 (H) 78 - 100 FL    MCH 35.3 (H) 27 - 31 PG    MCHC 34.2 32.0 - 36.0 %    RDW 15.1 (H) 11.7 - 14.9 %    Platelets 447 863 - 124 K/CU MM    MPV 10.4 7.5 - 11.1 FL    Differential Type AUTOMATED DIFFERENTIAL     Segs Relative 65.3 36 - 66 %    Lymphocytes % 25.0 24 - 44 %    Monocytes % 8.5 (H) 0 - 4 %    Eosinophils % 0.7 0 - 3 %    Basophils % 0.2 0 - 1 %    Segs Absolute 8.2 K/CU MM    Lymphocytes Absolute 3.1 K/CU MM    Monocytes Absolute 1.1 K/CU MM    Eosinophils Absolute 0.1 K/CU MM    Basophils Absolute 0.0 K/CU MM    Immature Neutrophil % 0.3 0 - 0.43 %    Total Immature Neutrophil 0.04 K/CU MM   Comprehensive Metabolic Panel w/ Reflex to MG   Result Value Ref Range    Sodium 126 (L) 135 - 145 MMOL/L    Potassium 2.5 (LL) 3.5 - 5.1 MMOL/L    Chloride 79 (L) 99 - 110 mMol/L    CO2 33 (H) 21 - 32 MMOL/L    BUN 20 6 - 23 MG/DL    CREATININE 3.2 (H) 0.9 - 1.3 MG/DL    Glucose 70 70 - 99 MG/DL    Calcium 8.8 8.3 - 10.6 MG/DL    Albumin 3.5 3.4 - 5.0 GM/DL    Total Protein 7.3 6.4 - 8.2 GM/DL    Total Bilirubin 0.7 0.0 - 1.0 MG/DL    ALT 12 10 - 40 U/L    AST 31 15 - 37 IU/L    Alkaline Phosphatase 96 40 - 129 IU/L    GFR Non- 19 (L) >60 mL/min/1.73m2    GFR  23 (L) >60 mL/min/1.73m2    Anion Gap 14 4 - 16   Troponin   Result Value Ref Range    Troponin T 0.044 (H) <0.01 NG/ML   Brain Natriuretic Peptide   Result Value Ref Range    Pro-.5 (H) <300 PG/ML   Protime/INR & PTT   Result Value Ref Range    Protime 14.0 11.7 - 14.5 SECONDS    INR 1.09 INDEX    aPTT 36.3 25.1 - 37.1 SECONDS   Magnesium   Result Value Ref Range    Magnesium 1.9 1.8 - 2.4 mg/dl   POCT Glucose   Result Value Ref Range    Glucose 65 mg/dL    QC OK?  yes    POCT Glucose   Result Value Ref Range POC Glucose 65 (L) 70 - 99 MG/DL   EKG 12 Lead   Result Value Ref Range    Ventricular Rate 79 BPM    Atrial Rate 79 BPM    P-R Interval 210 ms    QRS Duration 154 ms    Q-T Interval 478 ms    QTc Calculation (Bazett) 548 ms    P Axis 78 degrees    R Axis 63 degrees    T Axis 29 degrees    Diagnosis       Sinus rhythm with 1st degree AV block with premature atrial complexes  Right bundle branch block  Abnormal ECG  When compared with ECG of 13-MAR-2021 13:17,  premature ventricular complexes are no longer present  Right bundle branch block has replaced Nonspecific intraventricular block        Radiographs:  Radiologist's Report Reviewed:  Xr Chest (2 Vw)    Result Date: 3/22/2021  EXAMINATION: TWO XRAY VIEWS OF THE CHEST 3/22/2021 8:14 pm COMPARISON: None. HISTORY: ORDERING SYSTEM PROVIDED HISTORY: Dillan TECHNOLOGIST PROVIDED HISTORY: Reason for exam:->Dillan Reason for Exam: sob Type of Exam: Initial Additional signs and symptoms: sob FINDINGS: The heart is mildly enlarged. The pulmonary vessels are slightly prominent centrally. The lungs are hypoinflated with mild bibasilar linear densities which is more prominent. No effusion or consolidation is seen. The bones are intact. Mild cardiomegaly which is less prominent with resolving central pulmonary congestion. Hypoinflation with mild discoid atelectasis along the lung bases which is more prominent. EKG: (All EKG's are interpreted by myself in the absence of a cardiologist). EKG performed on 3/22/2021 at 2114 demonstrates ventricular rate of 79 beats minute sinus rhythm with first-degree AV block and premature atrial complexes. Right bundle branch block pattern. No signs of acute ST segment elevation myocardial infarction. EKG is compared to previous EKG performed on 3/13/2021 and is essentially unchanged. MDM:  Patient was seen and evaluated in the emergency department by myself.   A thorough history and physical exam were performed, prior medical records are reviewed. Upon arrival to the emergency department, patient's vital signs were noted. Patient was initially hypotensive with a blood pressure of 85/49. IV access was established and 500 cc bolus of normal saline is initiated. No tachycardia or tachypnea. As stated above, he is hypoxic requiring 3 L of supplemental oxygen via nasal cannula. Patient was connected to continuous cardiopulmonary monitoring. Rhythm strip was interpreted by myself demonstrating sinus rhythm. EKG is performed, interpreted by myself, as detailed above. Differential diagnoses and treatment plan were discussed. Pertinent labs are drawn and radiographic studies are performed. Once results are available they reviewed by myself. Labs demonstrate leukocytosis with a white blood cell count 12.5. Patient does have macrocytic anemia hemoglobin 12.0. No thrombocytopenia. Electrolytes demonstrate hyponatremia with a sodium of 126. Patient is hypokalemic with a potassium of 2.5. He is hypochloremic with a chloride of 79. CO2 is elevated at 33. BUN is within normal limits at 20. Creatinine is elevated at 3.2. AST and ALT are unremarkable. Troponin elevated at 0.044. proBNP is 477. 5. aPTT 36.3. PT/INR within normal limits. Magnesium 1.9. On repeat evaluations, patient remains hemodynamically stable. With supplemental oxygen, O2 saturation does remain above 95%. With 500 cc bolus of normal saline, blood pressure does improve to 276 systolic. Map remains above 65. Results of laboratory and radiographic data along with differential diagnosis and treatment plan were discussed with the patient. Patient presents with difficulty in breathing. Symptoms concerning for acute hypoxic respiratory failure requiring supplemental oxygen. Patient also has acute on chronic congestive heart failure. We do not have an echocardiogram on file.   In addition, patient has acute non-ST segment elevation myocardial infarction. He did take aspirin prior to arrival.  No ST elevations are appreciated on EKG. In addition, patient has clinical congestive heart failure exacerbation with cardiomegaly and rales or rhonchi. He is also hypokalemic. Potassium replacement is initiated. Mag replacement is also initiated. Given his symptoms, we recommend admission. Patient requests transfer to [facility]. Case was discussed with SHENG Boogie who is agreeable with treatment plan and accepts admission on behalf of Dr. Janet Archuleta. Patient is updated bedside. Transfer paperwork and EMTALA paperwork are completed. Patient is currently in the emergency department, in stable condition, awaiting transport via EMS. Clinical Impression:  1. Acute respiratory failure with hypoxia ([facility] [address]    2. Acute on chronic congestive heart failure, unspecified heart failure type ([facility] [address]    3. NSTEMI (non-ST elevated myocardial infarction) ([facility] [address]    4. RADU (acute kidney injury) ([facility] [address]    5. Cardiomegaly    6. Hypokalemia        Critical Care Note:  Total critical care time provided today was 32 minutes. This excludes seperately billable procedures and family discussion time. Critical care time provided for obtaining history, conducting a physical exam, performing and monitoring interventions, ordering, collecting and interpreting tests, and establishing medical decision-making. There was a potential for life/limb threatening pathology requiring close evaluation and intervention with concern for patient decompensation. ED Provider Disposition:  DISPOSITION Decision To Transfer 03/22/2021 09:04:12 PM      Comment: Please note this report has been produced using speech recognition software and may contain errors related to that system including errors in grammar, punctuation, and spelling, as well as words and phrases that may be inappropriate. Efforts were made to edit the dictations.        [address] [facility],   03/22/21 Taya Alves [address] Chinyere Alexandra DO  03/22/21 2234

## 2021-03-24 LAB
EKG ATRIAL RATE: 79 BPM
EKG DIAGNOSIS: NORMAL
EKG P AXIS: 78 DEGREES
EKG P-R INTERVAL: 210 MS
EKG Q-T INTERVAL: 478 MS
EKG QRS DURATION: 154 MS
EKG QTC CALCULATION (BAZETT): 548 MS
EKG R AXIS: 63 DEGREES
EKG T AXIS: 29 DEGREES
EKG VENTRICULAR RATE: 79 BPM

## 2021-04-02 ENCOUNTER — HOSPITAL ENCOUNTER (OUTPATIENT)
Age: 71
Discharge: HOME | End: 2021-04-02
Payer: MEDICARE

## 2021-04-02 LAB
ALBUMIN SERPL-MCNC: 4 GM/DL (ref 3.4–5)
ANION GAP SERPL CALCULATED.3IONS-SCNC: 10 MMOL/L (ref 4–16)
BACTERIA: NEGATIVE /HPF
BILIRUBIN URINE: NEGATIVE MG/DL
BLOOD, URINE: NEGATIVE
BUN BLDV-MCNC: 30 MG/DL (ref 6–23)
CALCIUM SERPL-MCNC: 9.5 MG/DL (ref 8.3–10.6)
CHLORIDE BLD-SCNC: 74 MMOL/L (ref 99–110)
CLARITY: CLEAR
CO2: 49 MMOL/L (ref 21–32)
COLOR: YELLOW
CREAT SERPL-MCNC: 2 MG/DL (ref 0.9–1.3)
CREATININE URINE: 80.2 MG/DL (ref 39–259)
GFR AFRICAN AMERICAN: 40 ML/MIN/1.73M2
GFR NON-AFRICAN AMERICAN: 33 ML/MIN/1.73M2
GLUCOSE BLD-MCNC: 90 MG/DL (ref 70–99)
GLUCOSE, URINE: NEGATIVE MG/DL
KETONES, URINE: NEGATIVE MG/DL
LEUKOCYTE ESTERASE, URINE: ABNORMAL
MAGNESIUM: 2 MG/DL (ref 1.8–2.4)
NITRITE URINE, QUANTITATIVE: NEGATIVE
PH, URINE: 7 (ref 5–8)
PHOSPHORUS: 2.6 MG/DL (ref 2.5–4.9)
POTASSIUM SERPL-SCNC: 2.4 MMOL/L (ref 3.5–5.1)
PROT/CREAT RATIO, UR: 0.4
PROTEIN UA: NEGATIVE MG/DL
RBC URINE: <1 /HPF (ref 0–3)
SODIUM BLD-SCNC: 133 MMOL/L (ref 135–145)
SPECIFIC GRAVITY UA: 1.01 (ref 1–1.03)
TRICHOMONAS: ABNORMAL /HPF
URINE TOTAL PROTEIN: 29.9 MG/DL
UROBILINOGEN, URINE: 2 MG/DL (ref 0.2–1)
WBC UA: 14 /HPF (ref 0–2)

## 2021-04-02 PROCEDURE — 80048 BASIC METABOLIC PNL TOTAL CA: CPT

## 2021-04-02 PROCEDURE — 83735 ASSAY OF MAGNESIUM: CPT

## 2021-04-02 PROCEDURE — 82040 ASSAY OF SERUM ALBUMIN: CPT

## 2021-04-02 PROCEDURE — 82570 ASSAY OF URINE CREATININE: CPT

## 2021-04-02 PROCEDURE — 81001 URINALYSIS AUTO W/SCOPE: CPT

## 2021-04-02 PROCEDURE — 36415 COLL VENOUS BLD VENIPUNCTURE: CPT

## 2021-04-02 PROCEDURE — 84100 ASSAY OF PHOSPHORUS: CPT

## 2021-04-02 PROCEDURE — 84156 ASSAY OF PROTEIN URINE: CPT

## 2021-04-07 PROBLEM — I51.89 DIASTOLIC DYSFUNCTION: Status: ACTIVE | Noted: 2021-04-07

## 2021-06-22 PROBLEM — F10.11 ALCOHOL ABUSE, IN REMISSION: Status: ACTIVE | Noted: 2021-06-22

## 2021-06-22 PROBLEM — N18.4 CHRONIC KIDNEY DISEASE, STAGE IV (SEVERE) (HCC): Status: ACTIVE | Noted: 2021-06-22

## 2021-06-29 ENCOUNTER — HOSPITAL ENCOUNTER (OUTPATIENT)
Age: 71
Discharge: HOME | End: 2021-06-29
Payer: MEDICARE

## 2021-06-29 DIAGNOSIS — R80.1 PERSISTENT PROTEINURIA: ICD-10-CM

## 2021-06-29 DIAGNOSIS — F10.11 ALCOHOL ABUSE, IN REMISSION: ICD-10-CM

## 2021-06-29 DIAGNOSIS — I10 ESSENTIAL HYPERTENSION: ICD-10-CM

## 2021-06-29 DIAGNOSIS — I63.119 CEREBROVASCULAR ACCIDENT (CVA) DUE TO EMBOLISM OF VERTEBRAL ARTERY, UNSPECIFIED BLOOD VESSEL LATERALITY (HCC): ICD-10-CM

## 2021-06-29 DIAGNOSIS — I51.89 DIASTOLIC DYSFUNCTION: ICD-10-CM

## 2021-06-29 DIAGNOSIS — E11.9 TYPE 2 DIABETES MELLITUS WITHOUT COMPLICATION, UNSPECIFIED WHETHER LONG TERM INSULIN USE (HCC): ICD-10-CM

## 2021-06-29 DIAGNOSIS — E87.6 HYPOKALEMIA: ICD-10-CM

## 2021-06-29 DIAGNOSIS — N18.4 CHRONIC KIDNEY DISEASE, STAGE IV (SEVERE) (HCC): ICD-10-CM

## 2021-06-29 LAB
ALBUMIN SERPL-MCNC: 3.8 GM/DL (ref 3.4–5)
ANION GAP SERPL CALCULATED.3IONS-SCNC: 10 MMOL/L (ref 4–16)
BACTERIA: NEGATIVE /HPF
BASOPHILS ABSOLUTE: 0.1 K/CU MM
BASOPHILS RELATIVE PERCENT: 0.6 % (ref 0–1)
BILIRUBIN URINE: NEGATIVE MG/DL
BLOOD, URINE: NEGATIVE
BUN BLDV-MCNC: 12 MG/DL (ref 6–23)
CALCIUM SERPL-MCNC: 8.6 MG/DL (ref 8.3–10.6)
CHLORIDE BLD-SCNC: 93 MMOL/L (ref 99–110)
CLARITY: CLEAR
CO2: 37 MMOL/L (ref 21–32)
COLOR: YELLOW
CREAT SERPL-MCNC: 1.5 MG/DL (ref 0.9–1.3)
CREATININE URINE: 72.4 MG/DL (ref 39–259)
DIFFERENTIAL TYPE: ABNORMAL
EOSINOPHILS ABSOLUTE: 0.1 K/CU MM
EOSINOPHILS RELATIVE PERCENT: 0.9 % (ref 0–3)
GFR AFRICAN AMERICAN: 56 ML/MIN/1.73M2
GFR NON-AFRICAN AMERICAN: 46 ML/MIN/1.73M2
GLUCOSE BLD-MCNC: 67 MG/DL (ref 70–99)
GLUCOSE, URINE: NEGATIVE MG/DL
HCT VFR BLD CALC: 35.7 % (ref 42–52)
HEMOGLOBIN: 11.4 GM/DL (ref 13.5–18)
IMMATURE NEUTROPHIL %: 0.4 % (ref 0–0.43)
KETONES, URINE: NEGATIVE MG/DL
LEUKOCYTE ESTERASE, URINE: NEGATIVE
LYMPHOCYTES ABSOLUTE: 1.8 K/CU MM
LYMPHOCYTES RELATIVE PERCENT: 19.9 % (ref 24–44)
MCH RBC QN AUTO: 33.1 PG (ref 27–31)
MCHC RBC AUTO-ENTMCNC: 31.9 % (ref 32–36)
MCV RBC AUTO: 103.8 FL (ref 78–100)
MONOCYTES ABSOLUTE: 0.8 K/CU MM
MONOCYTES RELATIVE PERCENT: 8.4 % (ref 0–4)
NITRITE URINE, QUANTITATIVE: NEGATIVE
NUCLEATED RBC %: 0 %
PDW BLD-RTO: 15.2 % (ref 11.7–14.9)
PH, URINE: 7 (ref 5–8)
PHOSPHORUS: 2.9 MG/DL (ref 2.5–4.9)
PLATELET # BLD: 184 K/CU MM (ref 140–440)
PMV BLD AUTO: 9.9 FL (ref 7.5–11.1)
POTASSIUM SERPL-SCNC: 2.5 MMOL/L (ref 3.5–5.1)
PROT/CREAT RATIO, UR: 0.5
PROTEIN UA: 30 MG/DL
RBC # BLD: 3.44 M/CU MM (ref 4.6–6.2)
RBC URINE: <1 /HPF (ref 0–3)
SEGMENTED NEUTROPHILS ABSOLUTE COUNT: 6.2 K/CU MM
SEGMENTED NEUTROPHILS RELATIVE PERCENT: 69.8 % (ref 36–66)
SODIUM BLD-SCNC: 140 MMOL/L (ref 135–145)
SPECIFIC GRAVITY UA: 1.01 (ref 1–1.03)
TOTAL IMMATURE NEUTOROPHIL: 0.04 K/CU MM
TOTAL NUCLEATED RBC: 0 K/CU MM
TRICHOMONAS: ABNORMAL /HPF
URINE TOTAL PROTEIN: 35.1 MG/DL
UROBILINOGEN, URINE: NEGATIVE MG/DL (ref 0.2–1)
WBC # BLD: 8.9 K/CU MM (ref 4–10.5)
WBC UA: <1 /HPF (ref 0–2)

## 2021-06-29 PROCEDURE — 84156 ASSAY OF PROTEIN URINE: CPT

## 2021-06-29 PROCEDURE — 85025 COMPLETE CBC W/AUTO DIFF WBC: CPT

## 2021-06-29 PROCEDURE — 82040 ASSAY OF SERUM ALBUMIN: CPT

## 2021-06-29 PROCEDURE — 36415 COLL VENOUS BLD VENIPUNCTURE: CPT

## 2021-06-29 PROCEDURE — 81001 URINALYSIS AUTO W/SCOPE: CPT

## 2021-06-29 PROCEDURE — 80048 BASIC METABOLIC PNL TOTAL CA: CPT

## 2021-06-29 PROCEDURE — 84100 ASSAY OF PHOSPHORUS: CPT

## 2021-06-29 PROCEDURE — 82570 ASSAY OF URINE CREATININE: CPT

## 2021-11-11 ENCOUNTER — APPOINTMENT (OUTPATIENT)
Dept: CT IMAGING | Age: 71
End: 2021-11-11
Payer: MEDICARE

## 2021-11-11 ENCOUNTER — APPOINTMENT (OUTPATIENT)
Dept: GENERAL RADIOLOGY | Age: 71
End: 2021-11-11
Payer: MEDICARE

## 2021-11-11 ENCOUNTER — HOSPITAL ENCOUNTER (EMERGENCY)
Age: 71
Discharge: HOME | End: 2021-11-11
Attending: EMERGENCY MEDICINE
Payer: MEDICARE

## 2021-11-11 VITALS
WEIGHT: 242 LBS | RESPIRATION RATE: 17 BRPM | BODY MASS INDEX: 35.84 KG/M2 | TEMPERATURE: 98.6 F | DIASTOLIC BLOOD PRESSURE: 95 MMHG | HEART RATE: 93 BPM | SYSTOLIC BLOOD PRESSURE: 174 MMHG | OXYGEN SATURATION: 93 % | HEIGHT: 69 IN
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
## 2021-11-11 DIAGNOSIS — S30.0XXA TRAUMATIC HEMATOMA OF BUTTOCK, INITIAL ENCOUNTER: ICD-10-CM

## 2021-11-11 LAB
ALBUMIN SERPL-MCNC: 3.7 GM/DL (ref 3.4–5)
ALP BLD-CCNC: 144 IU/L (ref 40–129)
ALT SERPL-CCNC: 17 U/L (ref 10–40)
ANION GAP SERPL CALCULATED.3IONS-SCNC: 14 MMOL/L (ref 4–16)
AST SERPL-CCNC: 38 IU/L (ref 15–37)
BASOPHILS ABSOLUTE: 0 K/CU MM
BASOPHILS RELATIVE PERCENT: 0.3 % (ref 0–1)
BILIRUB SERPL-MCNC: 0.6 MG/DL (ref 0–1)
BUN BLDV-MCNC: 18 MG/DL (ref 6–23)
CALCIUM SERPL-MCNC: 9.5 MG/DL (ref 8.3–10.6)
CHLORIDE BLD-SCNC: 97 MMOL/L (ref 99–110)
CO2: 25 MMOL/L (ref 21–32)
CREAT SERPL-MCNC: 1.7 MG/DL (ref 0.9–1.3)
DIFFERENTIAL TYPE: ABNORMAL
EOSINOPHILS ABSOLUTE: 0.1 K/CU MM
EOSINOPHILS RELATIVE PERCENT: 0.7 % (ref 0–3)
GFR AFRICAN AMERICAN: 48 ML/MIN/1.73M2
GFR NON-AFRICAN AMERICAN: 40 ML/MIN/1.73M2
GLUCOSE BLD-MCNC: 193 MG/DL (ref 70–99)
HCT VFR BLD CALC: 39 % (ref 42–52)
HEMOGLOBIN: 12.4 GM/DL (ref 13.5–18)
IMMATURE NEUTROPHIL %: 0.3 % (ref 0–0.43)
INR BLD: 1.85 INDEX
LYMPHOCYTES ABSOLUTE: 1.9 K/CU MM
LYMPHOCYTES RELATIVE PERCENT: 15.2 % (ref 24–44)
MCH RBC QN AUTO: 33.8 PG (ref 27–31)
MCHC RBC AUTO-ENTMCNC: 31.8 % (ref 32–36)
MCV RBC AUTO: 106.3 FL (ref 78–100)
MONOCYTES ABSOLUTE: 1.1 K/CU MM
MONOCYTES RELATIVE PERCENT: 8.5 % (ref 0–4)
PDW BLD-RTO: 14 % (ref 11.7–14.9)
PLATELET # BLD: 186 K/CU MM (ref 140–440)
PMV BLD AUTO: 9.7 FL (ref 7.5–11.1)
POTASSIUM SERPL-SCNC: 3.8 MMOL/L (ref 3.5–5.1)
PROTHROMBIN TIME: 20.9 SECONDS (ref 11.7–14.5)
RBC # BLD: 3.67 M/CU MM (ref 4.6–6.2)
SEGMENTED NEUTROPHILS ABSOLUTE COUNT: 9.3 K/CU MM
SEGMENTED NEUTROPHILS RELATIVE PERCENT: 75 % (ref 36–66)
SODIUM BLD-SCNC: 136 MMOL/L (ref 135–145)
TOTAL IMMATURE NEUTOROPHIL: 0.04 K/CU MM
TOTAL PROTEIN: 7.2 GM/DL (ref 6.4–8.2)
WBC # BLD: 12.4 K/CU MM (ref 4–10.5)

## 2021-11-11 PROCEDURE — 6360000002 HC RX W HCPCS: Performed by: EMERGENCY MEDICINE

## 2021-11-11 PROCEDURE — 85025 COMPLETE CBC W/AUTO DIFF WBC: CPT

## 2021-11-11 PROCEDURE — 76376 3D RENDER W/INTRP POSTPROCES: CPT

## 2021-11-11 PROCEDURE — 74177 CT ABD & PELVIS W/CONTRAST: CPT

## 2021-11-11 PROCEDURE — 73700 CT LOWER EXTREMITY W/O DYE: CPT

## 2021-11-11 PROCEDURE — 99284 EMERGENCY DEPT VISIT MOD MDM: CPT

## 2021-11-11 PROCEDURE — 6360000004 HC RX CONTRAST MEDICATION: Performed by: EMERGENCY MEDICINE

## 2021-11-11 PROCEDURE — 72131 CT LUMBAR SPINE W/O DYE: CPT

## 2021-11-11 PROCEDURE — 96374 THER/PROPH/DIAG INJ IV PUSH: CPT

## 2021-11-11 PROCEDURE — 71045 X-RAY EXAM CHEST 1 VIEW: CPT

## 2021-11-11 PROCEDURE — 80053 COMPREHEN METABOLIC PANEL: CPT

## 2021-11-11 PROCEDURE — 85610 PROTHROMBIN TIME: CPT

## 2021-11-11 RX ORDER — FENTANYL CITRATE 50 UG/ML
50 INJECTION, SOLUTION INTRAMUSCULAR; INTRAVENOUS ONCE
Status: COMPLETED | OUTPATIENT
Start: 2021-11-11 | End: 2021-11-11

## 2021-11-11 RX ADMIN — IOPAMIDOL 75 ML: 755 INJECTION, SOLUTION INTRAVENOUS at 15:31

## 2021-11-11 RX ADMIN — FENTANYL CITRATE 50 MCG: 0.05 INJECTION, SOLUTION INTRAMUSCULAR; INTRAVENOUS at 14:34

## 2021-11-11 ASSESSMENT — PAIN DESCRIPTION - PROGRESSION: CLINICAL_PROGRESSION: NOT CHANGED

## 2021-11-11 ASSESSMENT — PAIN SCALES - GENERAL
PAINLEVEL_OUTOF10: 8
PAINLEVEL_OUTOF10: 8

## 2021-11-11 ASSESSMENT — PAIN DESCRIPTION - FREQUENCY: FREQUENCY: CONTINUOUS

## 2021-11-11 ASSESSMENT — PAIN DESCRIPTION - LOCATION: LOCATION: HIP

## 2021-11-11 ASSESSMENT — PAIN DESCRIPTION - ONSET: ONSET: ON-GOING

## 2021-11-11 ASSESSMENT — PAIN DESCRIPTION - ORIENTATION: ORIENTATION: LEFT

## 2021-11-11 ASSESSMENT — PAIN DESCRIPTION - DESCRIPTORS: DESCRIPTORS: SHARP

## 2021-11-11 ASSESSMENT — PAIN DESCRIPTION - PAIN TYPE: TYPE: ACUTE PAIN

## 2021-11-11 NOTE — ED TRIAGE NOTES
Pt arrived by EMS [facility] from home. Called prior to arrival for trauma. Pt arrived without C-collar and without back board laying R hip prone position of comfort. Ems states he was standing when they arrived. Pt fell down about 1 step, landed on L hip, while carrying desktop slipped on shoe. Pain and bruising to L hip and buttocks, pt takes coumadin Pt-INR tues 1.5 per pt. Moving L left leg to reposition himself, bearing weight, supported with a pillow. Abrasions to L arm. Denies hitting head or LOC. States drank about 3-4 margarittas today, not to let family know.  Pt A&Ox4

## 2021-11-11 NOTE — ED PROVIDER NOTES
Emergency Department Encounter    Patient: Brianna Lopez. MRN: [id number]  : 1950  Date of Evaluation: 2021  ED Provider:  Ramiro Badillo MD    Triage Chief Complaint:   Fall (Fall down 1 stair, L hip pain. Abrasions L arm. )    [place]:  Edinson George Sr. is a 80 y.o. male that presents with complaint of fall. Was drinking margaritas today, took a step backward while holding something and fell, landing on left hip/side. Pain at left hip. EMS reports obvious deformity. No neck or head pain, no LOC, did not strike his head. No back pain. No weakness or numbness in extremities. brought in by EMS mostly on right side/stomach with left hip up. Denies chest pain and SOB. No abdominal pain. Is on coumadin, INR on Tuesday was 1.5. denies other complaints. Was able to stand for EMS and take a step back onto the cot, then turned on his side. ROS - see HPI, below listed is current ROS at time of my eval:  10 systems reviewed and negative except as above. Past Medical History:   Diagnosis Date    Diabetes mellitus ([facility] [address]     Hypertension     Unspecified cerebral artery occlusion with cerebral infarction      Past Surgical History:   Procedure Laterality Date    ACHILLES TENDON SURGERY       Family History   Problem Relation Age of Onset    Diabetes Mother     Cancer Mother     Heart Disease Father     Cancer Father      Social History     Socioeconomic History    Marital status:      Spouse name: Not on file    Number of children: Not on file    Years of education: Not on file    Highest education level: Not on file   Occupational History    Not on file   Tobacco Use    Smoking status: Never Smoker    Smokeless tobacco: Never Used   Substance and Sexual Activity    Alcohol use:  Yes     Alcohol/week: 2.0 standard drinks     Types: 2 Glasses of wine per week     Comment: weekly, 3-4 akhil    Drug use: No    Sexual activity: Yes     Partners: Female   Other Topics Concern    Not on file   Social History Narrative    Not on file     Social Determinants of Health     Financial Resource Strain:     Difficulty of Paying Living Expenses: Not on file   Food Insecurity:     Worried About Running Out of Food in the Last Year: Not on file    Emy of Food in the Last Year: Not on file   Transportation Needs:     Lack of Transportation (Medical): Not on file    Lack of Transportation (Non-Medical): Not on file   Physical Activity:     Days of Exercise per Week: Not on file    Minutes of Exercise per Session: Not on file   Stress:     Feeling of Stress : Not on file   Social Connections:     Frequency of Communication with Friends and Family: Not on file    Frequency of Social Gatherings with Friends and Family: Not on file    Attends Protestant Services: Not on file    Active Member of [address] [place] [address] [employer] or Organizations: Not on file    Attends Club or Organization Meetings: Not on file    Marital Status: Not on file   Intimate Partner Violence:     Fear of Current or Ex-Partner: Not on file    Emotionally Abused: Not on file    Physically Abused: Not on file    Sexually Abused: Not on file   Housing Stability:     Unable to Pay for Housing in the Last Year: Not on file    Number of [place] in the Last Year: Not on file    Unstable Housing in the Last Year: Not on file     No current facility-administered medications for this encounter.      Current Outpatient Medications   Medication Sig Dispense Refill    spironolactone (ALDACTONE) 25 MG tablet Take 1 tablet by mouth 2 times daily 180 tablet 3    furosemide (LASIX) 20 MG tablet Take 1 tablet by mouth 2 times daily 180 tablet 3    rosuvastatin (CRESTOR) 10 MG tablet Take 10 mg by mouth daily      albuterol sulfate  (90 Base) MCG/ACT inhaler Inhale 2 puffs into the lungs every 6 hours as needed for Wheezing      warfarin (COUMADIN) 5 MG tablet Take 3 mg by mouth daily 1/2 mon and thursdays      aspirin 81 MG EC tablet Take 81 mg by mouth daily.  potassium chloride (KLOR-CON M) 20 MEQ extended release tablet Take 2 tablets by mouth 2 times daily 360 tablet 3    glipiZIDE (GLUCOTROL) 5 MG tablet TAKE ONE TABLET BY MOUTH TWICE A DAY BEFORE MEALS      Cyanocobalamin (B-12) 2000 MCG TABS Take 1 tablet by mouth daily      fluticasone (FLONASE) 50 MCG/ACT nasal spray 2 sprays by Each Nostril route daily      NONFORMULARY Take 1 tablet by mouth daily Multivitamin stress formula with zinc      sertraline (ZOLOFT) 100 MG tablet Take 100 mg by mouth daily      Thiamine HCl (B-1) 100 MG TABS Take 1 tablet by mouth daily      triamcinolone (KENALOG) 0.1 % lotion Apply topically 3 times daily Apply topically 3 times daily.  tamsulosin (FLOMAX) 0.4 MG capsule Take 0.4 mg by mouth daily      Coenzyme Q10 (CO Q 10) 100 MG CAPS Take 1 capsule by mouth daily      Cholecalciferol (VITAMIN D3) 2000 units CAPS Take 3 capsules by mouth daily      fish oil-omega-3 fatty acids 1000 MG capsule Take 2 g by mouth daily. Allergies   Allergen Reactions    Bee Venom     Pcn [Penicillins] Other (See Comments)     \"freezing up\"    Strawberry Extract     Tetanus Toxoids     Tetracyclines & Related Hives       Nursing Notes Reviewed    Physical Exam:  Triage VS:    ED Triage Vitals [11/11/21 1432]   Enc Vitals Group      BP (!) 178/94      Pulse 90      Resp 17      Temp 98.6 °F (37 °C)      Temp Source Oral      SpO2 95 %      Weight 242 lb (109.8 kg)      Height 5' 9\" (1.753 m)      Head Circumference       Peak Flow       Pain Score       Pain Loc       Pain Edu? Excl. in [address]? My pulse ox interpretation is - normal    Primary exam:  Airway: Intact. Speaking in normal voice. Breathing: Spontaneous. Equal chest rise and breath sounds. Circulation: Heart RRR. Pulses 2+. Secondary exam:   GENERAL APPEARANCE: Awake and alert. Cooperative.  No acute distress. Laying on EMS cot on his right side/stomach with left hip partially up, We moved him partially over onto our cot and turned him to his back flat on the cot. HEAD: Normocephalic. Atraumatic. No depressed skull fractures. EYES: EOM's grossly intact. Sclera anicteric. No [place] Eyes. ENT: Oral mucosa moist, Tolerates saliva. No Mcbride sign. NECK: Trachea midline, no obvious masses  CHEST/LUNGS: Non-tender. Lungs clear to auscultation bilaterally. Respirations nonlabored. HEART: Regular rate and rhythm. ABDOMEN: Soft. Non-distended. Non-tender. No guarding or rebound. BACK: was on his side, No cervical thoracic or lumbar midline tenderness to palpation, step-offs or deformities. Rolled to his back. EXTREMITIES: Upper and lower extremities have no acute deformities and they are non-tender to palpationexcept over the lateral left hip/buttocks where has a hematoma/bruising. Good ROM at the left hip and knee, actually pushed his heel against the bed to push himself up in the bed. SKIN: Warm and dry. No acute wounds  NEUROLOGICAL: Alert and oriented. No gross facial drooping. Strength 5/5 in upper and lower extremities Light touch sensation intact throughout.   Perfusion:  pulses intact and equal in all extremities      I have reviewed and interpreted all of the currently available lab results from this visit (if applicable):  Results for orders placed or performed during the hospital encounter of 11/11/21   PT - INR   Result Value Ref Range    Protime 20.9 (H) 11.7 - 14.5 SECONDS    INR 1.85 INDEX   CBC Auto Differential   Result Value Ref Range    WBC 12.4 (H) 4.0 - 10.5 K/CU MM    RBC 3.67 (L) 4.6 - 6.2 M/CU MM    Hemoglobin 12.4 (L) 13.5 - 18.0 GM/DL    Hematocrit 39.0 (L) 42 - 52 %    .3 (H) 78 - 100 FL    MCH 33.8 (H) 27 - 31 PG    MCHC 31.8 (L) 32.0 - 36.0 %    RDW 14.0 11.7 - 14.9 %    Platelets 427 263 - 991 K/CU MM    MPV 9.7 7.5 - 11.1 FL    Differential Type AUTOMATED DIFFERENTIAL Segs Relative 75.0 (H) 36 - 66 %    Lymphocytes % 15.2 (L) 24 - 44 %    Monocytes % 8.5 (H) 0 - 4 %    Eosinophils % 0.7 0 - 3 %    Basophils % 0.3 0 - 1 %    Segs Absolute 9.3 K/CU MM    Lymphocytes Absolute 1.9 K/CU MM    Monocytes Absolute 1.1 K/CU MM    Eosinophils Absolute 0.1 K/CU MM    Basophils Absolute 0.0 K/CU MM    Immature Neutrophil % 0.3 0 - 0.43 %    Total Immature Neutrophil 0.04 K/CU MM   CMP   Result Value Ref Range    Sodium 136 135 - 145 MMOL/L    Potassium 3.8 3.5 - 5.1 MMOL/L    Chloride 97 (L) 99 - 110 mMol/L    CO2 25 21 - 32 MMOL/L    BUN 18 6 - 23 MG/DL    CREATININE 1.7 (H) 0.9 - 1.3 MG/DL    Glucose 193 (H) 70 - 99 MG/DL    Calcium 9.5 8.3 - 10.6 MG/DL    Albumin 3.7 3.4 - 5.0 GM/DL    Total Protein 7.2 6.4 - 8.2 GM/DL    Total Bilirubin 0.6 0.0 - 1.0 MG/DL    ALT 17 10 - 40 U/L    AST 38 (H) 15 - 37 IU/L    Alkaline Phosphatase 144 (H) 40 - 129 IU/L    GFR Non- 40 (L) >60 mL/min/1.73m2    GFR  48 (L) >60 mL/min/1.73m2    Anion Gap 14 4 - 16      Radiographs (if obtained):  Radiologist's Report Reviewed:  No results found. EKG (if obtained): (All EKG's are interpreted by myself in the absence of a cardiologist)      MDM:  71-year-old male with history as above presents with concern for fall, on arrival he was laying partially on his side and partially on his stomach. Is tender over the buttocks and left hip but otherwise no traumatic findings, does have large hematoma. He is on Coumadin. He did not strike his head. Plan for labs, imaging. He is agreeable    On arrival vitals are good, mild hypertension. I had ordered fentanyl for pain control, did have desat with that, put on 2L NC with improvement. Was able to be weaned off of this. Imaging shows buttocks hematoma, otherwise no traumatic injury. Does appear to have some mild fluid overload, he denies any shortness of breath or cough or leg swelling.   He denies any other concerns, does not wish for any further evaluation at this time. We did discuss follow-up with his primary care for check tomorrow. He is comfortable going home, called his wife for a ride. Not requiring any oxygen, not in any distress, mildly hypertensive but otherwise stable vitals. Discharged in stable condition    Clinical Impression:  1. Fall, initial encounter    2. Traumatic hematoma of buttock, initial encounter      Disposition referral (if applicable):  Henrietta Ritter MD  [address]  [address]  [phone]    Schedule an appointment as soon as possible for a visit       [address]  [phone]    If symptoms worsen    Disposition medications (if applicable):  Discharge Medication List as of 11/11/2021  4:59 PM          Comment: Please note this report has been produced using speech recognition software and may contain errors related to that system including errors in grammar, punctuation, and spelling, as well as words and phrases that may be inappropriate. Efforts were made to edit the dictations.         Russell Jones MD  11/11/21 1831

## 2022-03-07 ENCOUNTER — HOSPITAL ENCOUNTER (OUTPATIENT)
Age: 72
Discharge: HOME | End: 2022-03-07
Payer: MEDICARE

## 2022-03-07 DIAGNOSIS — R80.1 PERSISTENT PROTEINURIA: ICD-10-CM

## 2022-03-07 DIAGNOSIS — I10 ESSENTIAL HYPERTENSION: ICD-10-CM

## 2022-03-07 DIAGNOSIS — I51.89 DIASTOLIC DYSFUNCTION: ICD-10-CM

## 2022-03-07 DIAGNOSIS — E11.9 TYPE 2 DIABETES MELLITUS WITHOUT COMPLICATION, WITHOUT LONG-TERM CURRENT USE OF INSULIN (HCC): ICD-10-CM

## 2022-03-07 DIAGNOSIS — F10.11 ALCOHOL ABUSE, IN REMISSION: ICD-10-CM

## 2022-03-07 DIAGNOSIS — E66.01 CLASS 2 SEVERE OBESITY DUE TO EXCESS CALORIES WITH SERIOUS COMORBIDITY AND BODY MASS INDEX (BMI) OF 39.0 TO 39.9 IN ADULT (HCC): ICD-10-CM

## 2022-03-07 DIAGNOSIS — N18.32 STAGE 3B CHRONIC KIDNEY DISEASE (HCC): ICD-10-CM

## 2022-03-07 LAB
ALBUMIN SERPL-MCNC: 3.7 GM/DL (ref 3.4–5)
ANION GAP SERPL CALCULATED.3IONS-SCNC: 10 MMOL/L (ref 4–16)
BACTERIA: NEGATIVE /HPF
BILIRUBIN URINE: NEGATIVE MG/DL
BLOOD, URINE: ABNORMAL
BUN BLDV-MCNC: 17 MG/DL (ref 6–23)
CALCIUM SERPL-MCNC: 8.8 MG/DL (ref 8.3–10.6)
CHLORIDE BLD-SCNC: 96 MMOL/L (ref 99–110)
CLARITY: CLEAR
CO2: 26 MMOL/L (ref 21–32)
COLOR: YELLOW
CREAT SERPL-MCNC: 1.4 MG/DL (ref 0.9–1.3)
CREATININE URINE: 78.7 MG/DL (ref 39–259)
GFR AFRICAN AMERICAN: >60 ML/MIN/1.73M2
GFR NON-AFRICAN AMERICAN: 50 ML/MIN/1.73M2
GLUCOSE BLD-MCNC: 148 MG/DL (ref 70–99)
GLUCOSE, URINE: NEGATIVE MG/DL
KETONES, URINE: NEGATIVE MG/DL
LEUKOCYTE ESTERASE, URINE: NEGATIVE
MAGNESIUM: 2.3 MG/DL (ref 1.8–2.4)
MUCUS: ABNORMAL HPF
NITRITE URINE, QUANTITATIVE: NEGATIVE
PH, URINE: 6 (ref 5–8)
PHOSPHORUS: 2.8 MG/DL (ref 2.5–4.9)
POTASSIUM SERPL-SCNC: 4.3 MMOL/L (ref 3.5–5.1)
PROT/CREAT RATIO, UR: 0.6
PROTEIN UA: 30 MG/DL
RBC URINE: ABNORMAL /HPF (ref 0–3)
SODIUM BLD-SCNC: 132 MMOL/L (ref 135–145)
SPECIFIC GRAVITY UA: 1.02 (ref 1–1.03)
SQUAMOUS EPITHELIAL: <1 /HPF
TRICHOMONAS: ABNORMAL /HPF
URINE TOTAL PROTEIN: 47.2 MG/DL
UROBILINOGEN, URINE: 0.2 MG/DL (ref 0.2–1)
WBC UA: <1 /HPF (ref 0–2)

## 2022-03-07 PROCEDURE — 84100 ASSAY OF PHOSPHORUS: CPT

## 2022-03-07 PROCEDURE — 82040 ASSAY OF SERUM ALBUMIN: CPT

## 2022-03-07 PROCEDURE — 80048 BASIC METABOLIC PNL TOTAL CA: CPT

## 2022-03-07 PROCEDURE — 84156 ASSAY OF PROTEIN URINE: CPT

## 2022-03-07 PROCEDURE — 82570 ASSAY OF URINE CREATININE: CPT

## 2022-03-07 PROCEDURE — 81001 URINALYSIS AUTO W/SCOPE: CPT

## 2022-03-07 PROCEDURE — 83735 ASSAY OF MAGNESIUM: CPT

## 2022-03-07 PROCEDURE — 36415 COLL VENOUS BLD VENIPUNCTURE: CPT

## 2022-03-11 PROBLEM — T14.8XXA HEMATOMA: Status: ACTIVE | Noted: 2022-03-11

## 2022-05-03 ENCOUNTER — HOSPITAL ENCOUNTER (OUTPATIENT)
Age: 72
Discharge: HOME | End: 2022-05-03
Payer: MEDICARE

## 2022-05-03 DIAGNOSIS — I63.119 CEREBROVASCULAR ACCIDENT (CVA) DUE TO EMBOLISM OF VERTEBRAL ARTERY, UNSPECIFIED BLOOD VESSEL LATERALITY (HCC): ICD-10-CM

## 2022-05-03 DIAGNOSIS — R80.1 PERSISTENT PROTEINURIA: ICD-10-CM

## 2022-05-03 DIAGNOSIS — Z71.41 ALCOHOL ABUSE COUNSELING AND SURVEILLANCE: ICD-10-CM

## 2022-05-03 DIAGNOSIS — I51.89 DIASTOLIC DYSFUNCTION: ICD-10-CM

## 2022-05-03 DIAGNOSIS — T14.8XXA HEMATOMA: ICD-10-CM

## 2022-05-03 DIAGNOSIS — E11.9 TYPE 2 DIABETES MELLITUS WITHOUT COMPLICATION, WITHOUT LONG-TERM CURRENT USE OF INSULIN (HCC): ICD-10-CM

## 2022-05-03 DIAGNOSIS — N18.32 STAGE 3B CHRONIC KIDNEY DISEASE (HCC): ICD-10-CM

## 2022-05-03 DIAGNOSIS — E66.01 CLASS 2 SEVERE OBESITY DUE TO EXCESS CALORIES WITH SERIOUS COMORBIDITY AND BODY MASS INDEX (BMI) OF 39.0 TO 39.9 IN ADULT (HCC): ICD-10-CM

## 2022-05-03 DIAGNOSIS — I10 ESSENTIAL HYPERTENSION: ICD-10-CM

## 2022-05-03 LAB
ALBUMIN SERPL-MCNC: 4.2 GM/DL (ref 3.4–5)
ALBUMIN SERPL-MCNC: 4.3 GM/DL (ref 3.4–5)
ALP BLD-CCNC: 257 IU/L (ref 40–128)
ALT SERPL-CCNC: 63 U/L (ref 10–40)
ANION GAP SERPL CALCULATED.3IONS-SCNC: 11 MMOL/L (ref 4–16)
ANION GAP SERPL CALCULATED.3IONS-SCNC: 11 MMOL/L (ref 4–16)
AST SERPL-CCNC: 113 IU/L (ref 15–37)
BACTERIA: NEGATIVE /HPF
BASOPHILS ABSOLUTE: 0 K/CU MM
BASOPHILS ABSOLUTE: 0.1 K/CU MM
BASOPHILS RELATIVE PERCENT: 0.5 % (ref 0–1)
BASOPHILS RELATIVE PERCENT: 0.6 % (ref 0–1)
BILIRUB SERPL-MCNC: 0.7 MG/DL (ref 0–1)
BILIRUBIN URINE: NEGATIVE MG/DL
BLOOD, URINE: ABNORMAL
BUN BLDV-MCNC: 19 MG/DL (ref 6–23)
BUN BLDV-MCNC: 20 MG/DL (ref 6–23)
CALCIUM SERPL-MCNC: 8.5 MG/DL (ref 8.3–10.6)
CALCIUM SERPL-MCNC: 8.7 MG/DL (ref 8.3–10.6)
CHLORIDE BLD-SCNC: 100 MMOL/L (ref 99–110)
CHLORIDE BLD-SCNC: 101 MMOL/L (ref 99–110)
CHOLESTEROL: 210 MG/DL
CLARITY: CLEAR
CO2: 24 MMOL/L (ref 21–32)
CO2: 25 MMOL/L (ref 21–32)
COLOR: YELLOW
CREAT SERPL-MCNC: 1.5 MG/DL (ref 0.9–1.3)
CREAT SERPL-MCNC: 1.5 MG/DL (ref 0.9–1.3)
CREATININE URINE: 42.3 MG/DL (ref 39–259)
DIFFERENTIAL TYPE: ABNORMAL
DIFFERENTIAL TYPE: ABNORMAL
EOSINOPHILS ABSOLUTE: 0.1 K/CU MM
EOSINOPHILS ABSOLUTE: 0.1 K/CU MM
EOSINOPHILS RELATIVE PERCENT: 1.2 % (ref 0–3)
EOSINOPHILS RELATIVE PERCENT: 1.5 % (ref 0–3)
ESTIMATED AVERAGE GLUCOSE: 120 MG/DL
GFR AFRICAN AMERICAN: 56 ML/MIN/1.73M2
GFR AFRICAN AMERICAN: 56 ML/MIN/1.73M2
GFR NON-AFRICAN AMERICAN: 46 ML/MIN/1.73M2
GFR NON-AFRICAN AMERICAN: 46 ML/MIN/1.73M2
GLUCOSE BLD-MCNC: 102 MG/DL (ref 70–99)
GLUCOSE BLD-MCNC: 104 MG/DL (ref 70–99)
GLUCOSE, URINE: NEGATIVE MG/DL
HBA1C MFR BLD: 5.8 % (ref 4.2–6.3)
HCT VFR BLD CALC: 43.9 % (ref 42–52)
HCT VFR BLD CALC: 44 % (ref 42–52)
HDLC SERPL-MCNC: 72 MG/DL
HEMOGLOBIN: 13.7 GM/DL (ref 13.5–18)
HEMOGLOBIN: 13.9 GM/DL (ref 13.5–18)
HYALINE CASTS: 2 /LPF
IMMATURE NEUTROPHIL %: 1.1 % (ref 0–0.43)
IMMATURE NEUTROPHIL %: 1.5 % (ref 0–0.43)
KETONES, URINE: NEGATIVE MG/DL
LDL CHOLESTEROL CALCULATED: 96 MG/DL
LDL CHOLESTEROL DIRECT: 93 MG/DL
LEUKOCYTE ESTERASE, URINE: NEGATIVE
LYMPHOCYTES ABSOLUTE: 2.2 K/CU MM
LYMPHOCYTES ABSOLUTE: 2.2 K/CU MM
LYMPHOCYTES RELATIVE PERCENT: 25.8 % (ref 24–44)
LYMPHOCYTES RELATIVE PERCENT: 26 % (ref 24–44)
MCH RBC QN AUTO: 36.2 PG (ref 27–31)
MCH RBC QN AUTO: 36.7 PG (ref 27–31)
MCHC RBC AUTO-ENTMCNC: 31.2 % (ref 32–36)
MCHC RBC AUTO-ENTMCNC: 31.6 % (ref 32–36)
MCV RBC AUTO: 116.1 FL (ref 78–100)
MCV RBC AUTO: 116.1 FL (ref 78–100)
MONOCYTES ABSOLUTE: 0.8 K/CU MM
MONOCYTES ABSOLUTE: 0.8 K/CU MM
MONOCYTES RELATIVE PERCENT: 9.2 % (ref 0–4)
MONOCYTES RELATIVE PERCENT: 9.6 % (ref 0–4)
MUCUS: ABNORMAL HPF
NITRITE URINE, QUANTITATIVE: NEGATIVE
NUCLEATED RBC %: 0 %
NUCLEATED RBC %: 0 %
PDW BLD-RTO: 15.2 % (ref 11.7–14.9)
PDW BLD-RTO: 15.3 % (ref 11.7–14.9)
PH, URINE: 5.5 (ref 5–8)
PHOSPHORUS: 3.5 MG/DL (ref 2.5–4.9)
PLATELET # BLD: 139 K/CU MM (ref 140–440)
PLATELET # BLD: 143 K/CU MM (ref 140–440)
PMV BLD AUTO: 10 FL (ref 7.5–11.1)
PMV BLD AUTO: 9.9 FL (ref 7.5–11.1)
POTASSIUM SERPL-SCNC: 4.3 MMOL/L (ref 3.5–5.1)
POTASSIUM SERPL-SCNC: 4.4 MMOL/L (ref 3.5–5.1)
PROT/CREAT RATIO, UR: 0.4
PROTEIN UA: NEGATIVE MG/DL
RBC # BLD: 3.78 M/CU MM (ref 4.6–6.2)
RBC # BLD: 3.79 M/CU MM (ref 4.6–6.2)
RBC URINE: <1 /HPF (ref 0–3)
SEGMENTED NEUTROPHILS ABSOLUTE COUNT: 5.2 K/CU MM
SEGMENTED NEUTROPHILS ABSOLUTE COUNT: 5.3 K/CU MM
SEGMENTED NEUTROPHILS RELATIVE PERCENT: 61.3 % (ref 36–66)
SEGMENTED NEUTROPHILS RELATIVE PERCENT: 61.7 % (ref 36–66)
SODIUM BLD-SCNC: 135 MMOL/L (ref 135–145)
SODIUM BLD-SCNC: 137 MMOL/L (ref 135–145)
SPECIFIC GRAVITY UA: 1.01 (ref 1–1.03)
SQUAMOUS EPITHELIAL: <1 /HPF
TOTAL IMMATURE NEUTOROPHIL: 0.09 K/CU MM
TOTAL IMMATURE NEUTOROPHIL: 0.13 K/CU MM
TOTAL NUCLEATED RBC: 0 K/CU MM
TOTAL NUCLEATED RBC: 0 K/CU MM
TOTAL PROTEIN: 7.2 GM/DL (ref 6.4–8.2)
TRICHOMONAS: ABNORMAL /HPF
TRIGL SERPL-MCNC: 211 MG/DL
URINE TOTAL PROTEIN: 17.5 MG/DL
UROBILINOGEN, URINE: 0.2 MG/DL (ref 0.2–1)
WBC # BLD: 8.4 K/CU MM (ref 4–10.5)
WBC # BLD: 8.6 K/CU MM (ref 4–10.5)
WBC UA: <1 /HPF (ref 0–2)

## 2022-05-03 PROCEDURE — 82040 ASSAY OF SERUM ALBUMIN: CPT

## 2022-05-03 PROCEDURE — 85025 COMPLETE CBC W/AUTO DIFF WBC: CPT

## 2022-05-03 PROCEDURE — 83721 ASSAY OF BLOOD LIPOPROTEIN: CPT

## 2022-05-03 PROCEDURE — 84100 ASSAY OF PHOSPHORUS: CPT

## 2022-05-03 PROCEDURE — 80048 BASIC METABOLIC PNL TOTAL CA: CPT

## 2022-05-03 PROCEDURE — 81001 URINALYSIS AUTO W/SCOPE: CPT

## 2022-05-03 PROCEDURE — 80053 COMPREHEN METABOLIC PANEL: CPT

## 2022-05-03 PROCEDURE — 82570 ASSAY OF URINE CREATININE: CPT

## 2022-05-03 PROCEDURE — 36415 COLL VENOUS BLD VENIPUNCTURE: CPT

## 2022-05-03 PROCEDURE — 80061 LIPID PANEL: CPT

## 2022-05-03 PROCEDURE — 84156 ASSAY OF PROTEIN URINE: CPT

## 2022-05-03 PROCEDURE — 83036 HEMOGLOBIN GLYCOSYLATED A1C: CPT

## 2022-07-18 ENCOUNTER — HOSPITAL ENCOUNTER (OUTPATIENT)
Age: 72
Discharge: HOME | End: 2022-07-18
Payer: MEDICARE

## 2022-07-18 DIAGNOSIS — R80.1 PERSISTENT PROTEINURIA: ICD-10-CM

## 2022-07-18 DIAGNOSIS — E11.9 TYPE 2 DIABETES MELLITUS WITHOUT COMPLICATION, WITHOUT LONG-TERM CURRENT USE OF INSULIN (HCC): ICD-10-CM

## 2022-07-18 DIAGNOSIS — E66.01 CLASS 2 SEVERE OBESITY DUE TO EXCESS CALORIES WITH SERIOUS COMORBIDITY AND BODY MASS INDEX (BMI) OF 39.0 TO 39.9 IN ADULT (HCC): ICD-10-CM

## 2022-07-18 DIAGNOSIS — I51.89 DIASTOLIC DYSFUNCTION: ICD-10-CM

## 2022-07-18 DIAGNOSIS — I63.119 CEREBROVASCULAR ACCIDENT (CVA) DUE TO EMBOLISM OF VERTEBRAL ARTERY, UNSPECIFIED BLOOD VESSEL LATERALITY (HCC): ICD-10-CM

## 2022-07-18 DIAGNOSIS — E87.6 HYPOKALEMIA: ICD-10-CM

## 2022-07-18 DIAGNOSIS — F10.11 ALCOHOL ABUSE, IN REMISSION: ICD-10-CM

## 2022-07-18 DIAGNOSIS — N18.31 STAGE 3A CHRONIC KIDNEY DISEASE (HCC): ICD-10-CM

## 2022-07-18 LAB
ALBUMIN SERPL-MCNC: 4 GM/DL (ref 3.4–5)
ANION GAP SERPL CALCULATED.3IONS-SCNC: 7 MMOL/L (ref 4–16)
BACTERIA: NEGATIVE /HPF
BASOPHILS ABSOLUTE: 0.1 K/CU MM
BASOPHILS RELATIVE PERCENT: 0.6 % (ref 0–1)
BILIRUBIN URINE: NEGATIVE MG/DL
BLOOD, URINE: ABNORMAL
BUN BLDV-MCNC: 10 MG/DL (ref 6–23)
CALCIUM SERPL-MCNC: 9.2 MG/DL (ref 8.3–10.6)
CHLORIDE BLD-SCNC: 96 MMOL/L (ref 99–110)
CLARITY: CLEAR
CO2: 32 MMOL/L (ref 21–32)
COLOR: YELLOW
CREAT SERPL-MCNC: 1.4 MG/DL (ref 0.9–1.3)
CREATININE URINE: 102.7 MG/DL (ref 39–259)
DIFFERENTIAL TYPE: ABNORMAL
EOSINOPHILS ABSOLUTE: 0.1 K/CU MM
EOSINOPHILS RELATIVE PERCENT: 1.7 % (ref 0–3)
GFR AFRICAN AMERICAN: >60 ML/MIN/1.73M2
GFR NON-AFRICAN AMERICAN: 50 ML/MIN/1.73M2
GLUCOSE BLD-MCNC: 231 MG/DL (ref 70–99)
GLUCOSE, URINE: >1000 MG/DL
HCT VFR BLD CALC: 44.1 % (ref 42–52)
HEMOGLOBIN: 14 GM/DL (ref 13.5–18)
IMMATURE NEUTROPHIL %: 0.8 % (ref 0–0.43)
KETONES, URINE: NEGATIVE MG/DL
LEUKOCYTE ESTERASE, URINE: NEGATIVE
LYMPHOCYTES ABSOLUTE: 1.8 K/CU MM
LYMPHOCYTES RELATIVE PERCENT: 21 % (ref 24–44)
MAGNESIUM: 2.2 MG/DL (ref 1.8–2.4)
MCH RBC QN AUTO: 35.8 PG (ref 27–31)
MCHC RBC AUTO-ENTMCNC: 31.7 % (ref 32–36)
MCV RBC AUTO: 112.8 FL (ref 78–100)
MONOCYTES ABSOLUTE: 0.7 K/CU MM
MONOCYTES RELATIVE PERCENT: 8.2 % (ref 0–4)
NITRITE URINE, QUANTITATIVE: NEGATIVE
NUCLEATED RBC %: 0 %
PDW BLD-RTO: 15.5 % (ref 11.7–14.9)
PH, URINE: 7 (ref 5–8)
PHOSPHORUS: 2.6 MG/DL (ref 2.5–4.9)
PLATELET # BLD: 139 K/CU MM (ref 140–440)
PMV BLD AUTO: 10 FL (ref 7.5–11.1)
POTASSIUM SERPL-SCNC: 4 MMOL/L (ref 3.5–5.1)
PROT/CREAT RATIO, UR: 0.9
PROTEIN UA: 30 MG/DL
RBC # BLD: 3.91 M/CU MM (ref 4.6–6.2)
RBC URINE: 1 /HPF (ref 0–3)
SEGMENTED NEUTROPHILS ABSOLUTE COUNT: 5.6 K/CU MM
SEGMENTED NEUTROPHILS RELATIVE PERCENT: 67.7 % (ref 36–66)
SODIUM BLD-SCNC: 135 MMOL/L (ref 135–145)
SPECIFIC GRAVITY UA: 1.01 (ref 1–1.03)
TOTAL IMMATURE NEUTOROPHIL: 0.07 K/CU MM
TOTAL NUCLEATED RBC: 0 K/CU MM
TRICHOMONAS: ABNORMAL /HPF
URINE TOTAL PROTEIN: 89 MG/DL
UROBILINOGEN, URINE: 1 MG/DL (ref 0.2–1)
WBC # BLD: 8.3 K/CU MM (ref 4–10.5)
WBC UA: ABNORMAL /HPF (ref 0–2)

## 2022-07-18 PROCEDURE — 81001 URINALYSIS AUTO W/SCOPE: CPT

## 2022-07-18 PROCEDURE — 36415 COLL VENOUS BLD VENIPUNCTURE: CPT

## 2022-07-18 PROCEDURE — 82040 ASSAY OF SERUM ALBUMIN: CPT

## 2022-07-18 PROCEDURE — 80048 BASIC METABOLIC PNL TOTAL CA: CPT

## 2022-07-18 PROCEDURE — 84156 ASSAY OF PROTEIN URINE: CPT

## 2022-07-18 PROCEDURE — 85025 COMPLETE CBC W/AUTO DIFF WBC: CPT

## 2022-07-18 PROCEDURE — 83735 ASSAY OF MAGNESIUM: CPT

## 2022-07-18 PROCEDURE — 82570 ASSAY OF URINE CREATININE: CPT

## 2022-07-18 PROCEDURE — 84100 ASSAY OF PHOSPHORUS: CPT

## 2023-02-03 ENCOUNTER — HOSPITAL ENCOUNTER (OUTPATIENT)
Age: 73
Discharge: HOME | End: 2023-02-03
Payer: MEDICARE

## 2023-02-03 DIAGNOSIS — N18.31 STAGE 3A CHRONIC KIDNEY DISEASE (HCC): ICD-10-CM

## 2023-02-03 DIAGNOSIS — R80.1 PERSISTENT PROTEINURIA: ICD-10-CM

## 2023-02-03 DIAGNOSIS — E11.9 TYPE 2 DIABETES MELLITUS WITHOUT COMPLICATION, WITHOUT LONG-TERM CURRENT USE OF INSULIN (HCC): ICD-10-CM

## 2023-02-03 DIAGNOSIS — I10 ESSENTIAL HYPERTENSION: ICD-10-CM

## 2023-02-03 DIAGNOSIS — F10.11 ALCOHOL ABUSE, IN REMISSION: ICD-10-CM

## 2023-02-03 LAB
ALBUMIN SERPL-MCNC: 3.7 GM/DL (ref 3.4–5)
ANION GAP SERPL CALCULATED.3IONS-SCNC: 11 MMOL/L (ref 4–16)
BACTERIA: NEGATIVE /HPF
BILIRUBIN URINE: NEGATIVE MG/DL
BLOOD, URINE: ABNORMAL
BUN SERPL-MCNC: 10 MG/DL (ref 6–23)
CALCIUM SERPL-MCNC: 9.1 MG/DL (ref 8.3–10.6)
CHLORIDE BLD-SCNC: 96 MMOL/L (ref 99–110)
CLARITY: CLEAR
CO2: 31 MMOL/L (ref 21–32)
COLOR: YELLOW
CREAT SERPL-MCNC: 1.4 MG/DL (ref 0.9–1.3)
CREATININE URINE: 84.8 MG/DL (ref 39–259)
GFR SERPL CREATININE-BSD FRML MDRD: 54 ML/MIN/1.73M2
GLUCOSE SERPL-MCNC: 188 MG/DL (ref 70–99)
GLUCOSE, URINE: 100 MG/DL
KETONES, URINE: NEGATIVE MG/DL
LEUKOCYTE ESTERASE, URINE: NEGATIVE
MAGNESIUM: 2.1 MG/DL (ref 1.8–2.4)
MUCUS: ABNORMAL HPF
NITRITE URINE, QUANTITATIVE: NEGATIVE
PH, URINE: 6 (ref 5–8)
PHOSPHORUS: 2.8 MG/DL (ref 2.5–4.9)
POTASSIUM SERPL-SCNC: 3.6 MMOL/L (ref 3.5–5.1)
PROT/CREAT RATIO, UR: 0.4
PROTEIN UA: ABNORMAL MG/DL
RBC URINE: 2 /HPF (ref 0–3)
SODIUM BLD-SCNC: 138 MMOL/L (ref 135–145)
SPECIFIC GRAVITY UA: 1.01 (ref 1–1.03)
SQUAMOUS EPITHELIAL: 1 /HPF
TRICHOMONAS: ABNORMAL /HPF
URINE TOTAL PROTEIN: 37.6 MG/DL
UROBILINOGEN, URINE: 0.2 MG/DL (ref 0.2–1)
WBC UA: <1 /HPF (ref 0–2)

## 2023-02-03 PROCEDURE — 84100 ASSAY OF PHOSPHORUS: CPT

## 2023-02-03 PROCEDURE — 83735 ASSAY OF MAGNESIUM: CPT

## 2023-02-03 PROCEDURE — 80048 BASIC METABOLIC PNL TOTAL CA: CPT

## 2023-02-03 PROCEDURE — 81001 URINALYSIS AUTO W/SCOPE: CPT

## 2023-02-03 PROCEDURE — 84156 ASSAY OF PROTEIN URINE: CPT

## 2023-02-03 PROCEDURE — 82040 ASSAY OF SERUM ALBUMIN: CPT

## 2023-02-03 PROCEDURE — 82570 ASSAY OF URINE CREATININE: CPT

## 2023-02-03 PROCEDURE — 36415 COLL VENOUS BLD VENIPUNCTURE: CPT

## 2023-02-07 PROBLEM — R31.1 BENIGN ESSENTIAL MICROSCOPIC HEMATURIA: Status: ACTIVE | Noted: 2023-02-07

## 2023-02-14 ENCOUNTER — INITIAL CONSULT (OUTPATIENT)
Dept: PULMONOLOGY | Age: 73
End: 2023-02-14
Payer: MEDICARE

## 2023-02-14 VITALS
OXYGEN SATURATION: 95 % | RESPIRATION RATE: 16 BRPM | HEART RATE: 105 BPM | BODY MASS INDEX: 34.66 KG/M2 | HEIGHT: 69 IN | WEIGHT: 234 LBS | SYSTOLIC BLOOD PRESSURE: 128 MMHG | DIASTOLIC BLOOD PRESSURE: 64 MMHG

## 2023-02-14 DIAGNOSIS — Z86.79 HISTORY OF CHF (CONGESTIVE HEART FAILURE): ICD-10-CM

## 2023-02-14 DIAGNOSIS — R91.1 NODULE OF UPPER LOBE OF LEFT LUNG: Primary | ICD-10-CM

## 2023-02-14 DIAGNOSIS — J45.20 MILD INTERMITTENT ASTHMA WITHOUT COMPLICATION: Primary | ICD-10-CM

## 2023-02-14 DIAGNOSIS — N18.4 CHRONIC KIDNEY DISEASE, STAGE IV (SEVERE) (HCC): ICD-10-CM

## 2023-02-14 DIAGNOSIS — G47.33 OBSTRUCTIVE SLEEP APNEA: ICD-10-CM

## 2023-02-14 DIAGNOSIS — R59.0 MEDIASTINAL ADENOPATHY: ICD-10-CM

## 2023-02-14 DIAGNOSIS — J45.20 MILD INTERMITTENT ASTHMA WITHOUT COMPLICATION: ICD-10-CM

## 2023-02-14 LAB
EXPIRATORY TIME-POST: NORMAL
EXPIRATORY TIME: NORMAL
FEF 25-75% %CHNG: NORMAL
FEF 25-75% %PRED-POST: NORMAL
FEF 25-75% %PRED-PRE: NORMAL
FEF 25-75% PRED: NORMAL
FEF 25-75%-POST: NORMAL
FEF 25-75%-PRE: NORMAL
FEV1 %PRED-POST: 27.2 %
FEV1 %PRED-PRE: 45.5 %
FEV1 PRED: 3.09 L
FEV1-POST: 0.84 L
FEV1-PRE: 1.41 L
FEV1/FVC %PRED-POST: 63.7 %
FEV1/FVC %PRED-PRE: 103.7 %
FEV1/FVC PRED: 73.3 %
FEV1/FVC-POST: 46.7 %
FEV1/FVC-PRE: 76 %
FVC %PRED-POST: 42.6 L
FVC %PRED-PRE: 43.8 %
FVC PRED: 4.22 L
FVC-POST: 1.8 L
FVC-PRE: 1.85 L
PEF %PRED-POST: NORMAL
PEF %PRED-PRE: NORMAL
PEF PRED: NORMAL
PEF%CHNG: NORMAL
PEF-POST: NORMAL
PEF-PRE: NORMAL

## 2023-02-14 PROCEDURE — 3074F SYST BP LT 130 MM HG: CPT | Performed by: INTERNAL MEDICINE

## 2023-02-14 PROCEDURE — 1123F ACP DISCUSS/DSCN MKR DOCD: CPT | Performed by: INTERNAL MEDICINE

## 2023-02-14 PROCEDURE — 3078F DIAST BP <80 MM HG: CPT | Performed by: INTERNAL MEDICINE

## 2023-02-14 PROCEDURE — 99204 OFFICE O/P NEW MOD 45 MIN: CPT | Performed by: INTERNAL MEDICINE

## 2023-02-14 ASSESSMENT — PULMONARY FUNCTION TESTS
FEV1/FVC_PRE: 76.0
FVC_PERCENT_PREDICTED_POST: 42.6
FEV1_PRE: 1.41
FVC_PREDICTED: 4.22
FEV1/FVC_PREDICTED: 73.3
FEV1/FVC_PERCENT_PREDICTED_POST: 63.7
FVC_PRE: 1.85
FVC_PERCENT_PREDICTED_PRE: 43.8
FEV1_POST: 0.84
FEV1_PERCENT_PREDICTED_POST: 27.2
FEV1_PREDICTED: 3.09
FEV1/FVC_POST: 46.7
FEV1/FVC_PERCENT_PREDICTED_PRE: 103.7
FVC_POST: 1.80
FEV1_PERCENT_PREDICTED_PRE: 45.5

## 2023-02-14 NOTE — PROGRESS NOTES
Subjective:   CHIEF COMPLAINT / HPI: Left lingular lung nodule, mediastinal and hilar adenopathy, untreated obstructive sleep apnea, mild dyspnea on exertion, mild intermittent asthma, history of CHF, chronic kidney disease    Philly Dykes is a 61-year-old male referred here for evaluation of a left upper lobe lung nodule. He mentions that he has been told he has abnormalities on his chest x-ray for years and was told he had enlarged lymph nodes in his chest in the past.  In 2021 he was found to have a left upper lobe lung nodule but it does not appear he had any further work-up at that time. He was hospitalized in 2021 at [facility] in congestive heart failure with fluid overload and it sounds like he required thoracentesis and massive diuresis. He has not had problems with that since   Over the past several months he mentions that he has lost weight with intention and is feeling much better and not experiencing shortness of breath. He has had multiple episodes of pneumonia in the past but denies a history of COPD. He does carry history of obstructive sleep apnea but states that he did not require CPAP therapy. His wife mentions that he does not snore heavily and does not note him snoring or gasping for air at nighttime. He does not complain of excessive daytime sleepiness. He also has a history of mild intermittent asthma and has an albuterol rescue inhaler which he uses not more than once or twice a week. He does experience mild dyspnea exertion but this does not prevent him from being active. He denies fever or chills and denies any recent episodes of bronchitis. He has always been a non-smoker although he did have secondary smoke exposure as a child and adolescent. He mentions that he has had some occupational exposures to asbestos in the past and other chemicals but has never been diagnosed with an occupational lung disease. He also denies a history of TB or fungal infection.   He does carry history of chronic kidney disease and is followed by nephrology. He has a past history of alcohol use but no longer uses alcohol. 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 He had COVID-19 infection over the [place] holidays this past year but did not require any specific medical intervention. Prior to that he had had both initial COVID-19 vaccinations and boosters but he has not received the most recent hybrid booster COVID-19 vaccination. Office spirometry demonstrates an FEV1 of 1.41 L with an FVC of 1.85. There is no significant airways obstruction. Following bronchodilators he had no significant response. Because his FEV1 and FVC are both reduced I cannot rule out a restrictive lung process without further testing  Past Medical History:  Past Medical History:   Diagnosis Date    Diabetes mellitus ([facility] [address]     Hypertension     Unspecified cerebral artery occlusion with cerebral infarction        Current Medications:    No current facility-administered medications for this visit.     Allergies   Allergen Reactions    Bee Venom     Pcn [Penicillins] Other (See Comments)     \"freezing up\"    Strawberry Extract     Tetanus Toxoids     Tetracyclines & Related Hives       Social History:    Social History     Socioeconomic History    Marital status:      Spouse name: None    Number of children: None    Years of education: None    Highest education level: None   Tobacco Use    Smoking status: Never    Smokeless tobacco: Never   Substance and Sexual Activity    Alcohol use: Not Currently     Alcohol/week: 2.0 standard drinks     Types: 2 Glasses of wine per week    Drug use: No    Sexual activity: Yes     Partners: Female       Family History:    Family History   Problem Relation Age of Onset    Diabetes Mother     Cancer Mother     Heart Disease Father     Cancer Father          REVIEW OF SYSTEMS:    CONSTITUTIONAL: negative for fevers, chills, diaphoresis, activity change, appetite change, night sweats and unexpected weight change. HEENT:  negative for hearing loss,  sinus pressure, nasal congestion, epistaxis   RESPIRATORY:  See HPI  CARDIOVASCULAR:  negative for chest pain, palpitations, exertional chest pressure/discomfort, edema, syncope  GASTROINTESTINAL: negative for nausea, vomiting, diarrhea, constipation, blood in stool and abdominal pain  GENITOURINARY:  negative for frequency, dysuria and hematuria  HEMATOLOGIC/LYMPHATIC:  negative for easy bruising, bleeding and lymphadenopathy  ALLERGIC/IMMUNOLOGIC:  negative for recurrent infections, angioedema, anaphylaxis and drug reaction  MUSCULOSKELETAL:  negative for  pain, joint swelling, decreased range of motion and muscle weakness  NEURO: negative for chronic headaches,seizures,TIA,CVA  SKIN: negative for rash,pruritis    Objective:   PHYSICAL EXAM:      VITALS:    Vitals:    02/14/23 1055   BP: 128/64   Pulse: (!) 105   Resp: 16   SpO2: 95%   Weight: 234 lb (106.1 kg)   Height: 5' 9\" (1.753 m)         CONSTITUTIONAL:  awake, alert, cooperative, no apparent distress, and appears stated age, in no respiratory distress at rest  HEENT:  supple and nontender,  trachea midline, no adenopathy, thyroid nl, no JVD, no wheezing or stridor over neck, increased neck girth  CHEST: chest expansion equal and symmetrical, no intercostal retraction, no increased work of breathing  LUNGS: Breath sounds are minimally diminished bilaterally but I hear no wheezing or rhonchi and there is no pleural rubs. There is no basilar rales  CARDIOVASCULAR: normal S1 and S2 , no murmurs or gallops ,no pericardial rubs  ABDOMEN:  normal bowel sounds, non-distended and no masses palpated, and no tenderness to palpation. No hepatosplenomegaly  LYMPHADENOPATHY:  no axillary or supraclavicular adenopathy. No cervical adenopathy  EXTREMITIES: no edema, no inflammation, no tenderness.   NEURO: oriented X 3, No focal deficits  SKIN: no rashes, No lesions    RADIOLOGY: CT chest done through [address] [place] [address] on 5/19/2022 showed no acute findings in the chest with no evidence of pneumonia. Mediastinal lymphadenopathy. 1 cm left upper lobe nodule nonspecific. CT chest 3/13/2021  Office spirometry demonstrates an FEV1 of 1.41 L with an FVC of 1.85. There is no significant airways obstruction. Following bronchodilators he had no significant response. Because his FEV1 and FVC are both reduced I cannot rule out a restrictive lung process without further testing  PFT: Office spirometry demonstrates an FEV1 of 1.41 L with an FVC of 1.85. There is no significant airways obstruction. Following bronchodilators he had no significant response. Because his FEV1 and FVC are both reduced I cannot rule out a restrictive lung process without further testing  Echocardiogram: No echo available  Assessment:     1. Nodule of upper lobe of left lung    2. Mediastinal adenopathy    3. History of CHF (congestive heart failure)    4. Obstructive sleep apnea    5. Mild intermittent asthma without complication    6. Chronic kidney disease, stage IV (severe) ([facility] [address]        Plan:   I will try to have his CT chest from [address] [place] [address] compared to his CT chest done at [address] in 2021. I do think would be helpful to obtain a PET/CT to better define the metabolic activity associated with both the nodule and his significant mediastinal and hilar lymphadenopathy. For the time being I will make no changes in his current therapy for asthma or reevaluate his obstructive sleep apnea. [facility] will continue to follow him      We have discussed the need to maintain yearly flu immunization, pneumococcal vaccination.  We have discussed Coronavirus precaution including handwashing practice, wiping items touched in public such as gas pumps, door handles, shopping carts, etc. Self monitoring for infection - fever, chills, cough, SOB. Should they develop symptoms they should call office for further instructions. Return for Recheck for Asthma, recheck for lung nodule, recheck for mediastinal adenopathy. This dictation was performed with a verbal recognition program and it was checked for errors. It is possible that there are still dictated errors within this office note. Any errors should be brought immediately to my attention for correction. All efforts were made to ensure that this office note is accurate.

## 2023-03-03 ENCOUNTER — HOSPITAL ENCOUNTER (OUTPATIENT)
Dept: PET IMAGING | Age: 73
Discharge: HOME | End: 2023-03-03
Payer: MEDICARE

## 2023-03-03 DIAGNOSIS — R59.0 MEDIASTINAL ADENOPATHY: ICD-10-CM

## 2023-03-03 DIAGNOSIS — R91.1 NODULE OF UPPER LOBE OF LEFT LUNG: ICD-10-CM

## 2023-03-03 DIAGNOSIS — J45.20 MILD INTERMITTENT ASTHMA WITHOUT COMPLICATION: ICD-10-CM

## 2023-03-03 DIAGNOSIS — N18.4 CHRONIC KIDNEY DISEASE, STAGE IV (SEVERE) (HCC): ICD-10-CM

## 2023-03-03 PROCEDURE — 3430000000 HC RX DIAGNOSTIC RADIOPHARMACEUTICAL: Performed by: INTERNAL MEDICINE

## 2023-03-03 PROCEDURE — A9552 F18 FDG: HCPCS | Performed by: INTERNAL MEDICINE

## 2023-03-03 PROCEDURE — 78815 PET IMAGE W/CT SKULL-THIGH: CPT

## 2023-03-03 RX ORDER — FLUDEOXYGLUCOSE F 18 200 MCI/ML
12.91 INJECTION, SOLUTION INTRAVENOUS
Status: COMPLETED | OUTPATIENT
Start: 2023-03-03 | End: 2023-03-03

## 2023-03-03 RX ADMIN — FLUDEOXYGLUCOSE F 18 12.91 MILLICURIE: 200 INJECTION, SOLUTION INTRAVENOUS at 10:40

## 2023-03-22 ENCOUNTER — TELEPHONE (OUTPATIENT)
Dept: PULMONOLOGY | Age: 73
End: 2023-03-22

## 2023-03-22 ENCOUNTER — TELEPHONE (OUTPATIENT)
Dept: GASTROENTEROLOGY | Age: 73
End: 2023-03-22

## 2023-03-28 ENCOUNTER — OFFICE VISIT (OUTPATIENT)
Dept: PULMONOLOGY | Age: 73
End: 2023-03-28
Payer: MEDICARE

## 2023-03-28 VITALS
OXYGEN SATURATION: 97 % | BODY MASS INDEX: 34.36 KG/M2 | DIASTOLIC BLOOD PRESSURE: 60 MMHG | HEART RATE: 81 BPM | HEIGHT: 69 IN | SYSTOLIC BLOOD PRESSURE: 102 MMHG | WEIGHT: 232 LBS

## 2023-03-28 DIAGNOSIS — G47.33 OBSTRUCTIVE SLEEP APNEA: ICD-10-CM

## 2023-03-28 DIAGNOSIS — R91.1 NODULE OF UPPER LOBE OF LEFT LUNG: Primary | ICD-10-CM

## 2023-03-28 DIAGNOSIS — J45.20 MILD INTERMITTENT ASTHMA WITHOUT COMPLICATION: ICD-10-CM

## 2023-03-28 DIAGNOSIS — R59.0 MEDIASTINAL ADENOPATHY: ICD-10-CM

## 2023-03-28 PROCEDURE — 3078F DIAST BP <80 MM HG: CPT | Performed by: INTERNAL MEDICINE

## 2023-03-28 PROCEDURE — 3074F SYST BP LT 130 MM HG: CPT | Performed by: INTERNAL MEDICINE

## 2023-03-28 PROCEDURE — 99213 OFFICE O/P EST LOW 20 MIN: CPT | Performed by: INTERNAL MEDICINE

## 2023-03-28 PROCEDURE — 1123F ACP DISCUSS/DSCN MKR DOCD: CPT | Performed by: INTERNAL MEDICINE

## 2023-03-28 NOTE — PROGRESS NOTES
Consult Note


Consult Note





asked to eval for dialysis management-


came to Er with pulmonary edema and HyperKalemia


Now in ICU


on HD for 7 years


right arm fistula


HTN and DM


Assessment/Plan





Status;





(1) ESRF (end stage renal failure)


(2) Fever, leukocytosis


(3) Acute hyperkalemia


(4) Pulmonary edema


(5) Anemia





Plan:





HD ASAP


Low K bath


BP control


2D Echo


cultures


Per orders











JOSTIN JACKSON Nov 27, 2017 11:46
DATE OF CONSULTATION:  11/27/2017



CARDIOLOGY CONSULTATION



CONSULTING PHYSICIAN:  Alli Hart M.D.



REFERRING PHYSICIAN:  Joie Jordan M.D.



REASON FOR CONSULTATION:  Shortness of breath.



HISTORY OF PRESENT ILLNESS:  The patient is a very unfortunate 43-year-old

 female, who presents to the hospital with shortness of breath.

Apparently, she has had hard time breathing for a couple of days, worse

with supine position and worse with activities.



The patient had no chest pain on arrival to the hospital, but she was

found to have blood pressure of 200/64 mmHg.



She was having moderate respiratory distress on arrival to the emergency

department, was placed on Ambu-bag at the time of arrival.  She was

diagnosed with acute respiratory failure with hypoxemia and was admitted

to intensive care unit for further evaluation and management.  Cardiology

consultation was made at the request of Dr. Jordan for evaluation of

slight elevation of troponin I level.



The patient has history of end-stage renal disease on hemodialysis on

Tuesdays, Thursdays, and Saturdays.



She denies any prior history of coronary artery disease.



PAST MEDICAL HISTORY:  Hypertension, end-stage renal disease on

hemodialysis, and history of diabetes mellitus.



PAST SURGICAL HISTORY:  AV shunt placement, PermCath placement.



MEDICATIONS:  List of medication includes atenolol 25 mg p.o. daily,

Lexapro 10 mg p.o. daily, Nexium 20 mg p.o. daily, Synthroid 100 mcg p.o.

daily, and nifedipine XL 30 mg p.o. daily.



ALLERGIES:  To penicillin.



SOCIAL HISTORY:  Denies any tobacco, alcohol, or illicit drug use.



FAMILY HISTORY:  No premature coronary artery disease in first-degree

relatives.



REVIEW OF SYSTEMS:  A 12-system review done essentially negative except

what was mentioned in history of present illness.



PHYSICAL EXAMINATION:

VITAL SIGNS:  Blood pressure was 200/84 mmHg, respirations 20, pulse of 85,

temperature 98.8 degrees Fahrenheit, and O2 saturation 100% on 4 L of O2

flow rate.

GENERAL:  The patient is a very pleasant 43-year-old female, in no apparent

respiratory distress at this time, on Venturi mask.  Alert and oriented

x4.

HEENT:  Atraumatic and normocephalic.  Anicteric.  Pupils are equal, round,

and reactive to light and accommodation.  There is conjunctival pallor.

NECK:  JVP is about 15 cmH2O.  No carotid bruit.  Carotid upstrokes 2+

bilaterally.

CARDIOVASCULAR:  Normal S1, S2.  Regular rate and rhythm.  No murmurs,

gallops, or rubs.  PMI is at fourth intercostal space __________

midclavicular line.

LUNGS:  Some bilateral rhonchi in both bases.  Scattered crackles in the

base of both lungs.

ABDOMEN:  Soft, nontender, nondistended.  No hepatosplenomegaly.  Positive

bowel sounds.

EXTREMITIES:  No evidence of edema, clubbing, or cyanosis.



DIAGNOSTIC FINDINGS:  A 12-lead electrocardiogram shows sinus rhythm, rate

of 89 with nonspecific ST and T-wave abnormalities.



A 2D echocardiography was reviewed and essentially shows normal LV

systolic function with LVEF of approximately 55%.  There is grade 2 LV

diastolic dysfunction or pseudonormal LV physiology consistent with

moderately elevated left atrial pressure.  High E to E prime ratio also is

suggestive of increased intracardiac filling pressures.



Pulmonary artery pressure was measured at 55-60 mmHg consistent with

severe pulmonary hypertension.



LABORATORY FINDINGS:  WBC 10.4, hemoglobin 10.2, hematocrit 31.7, and

platelet count is 144,000.  Sodium was 137, potassium 6.5, chloride 103,

bicarbonate 22, BUN of 44, creatinine 7.1, glucose is 120, calcium is 9.4.

ProBNP was 615,696.  Troponin I was less than 0.012, second was 0.067,

and then third was 0.090.  Hemoglobin A1c was 5.1.  Triglyceride was 214,

total cholesterol was 236, , HDL 56.



ASSESSMENT AND PLAN:  The patient is a very unfortunate 43-year-old female,

seen in cardiology consultation at the request of Dr. Jordan.



1. Elevated troponin I level in this patient is mostly due to acute

diastolic heart failure and slight myocarditis as the pattern of the

troponin I is not so much compatible with acute plaque rupture.  I cannot,

however, rule out demand ischemia in the setting of coronary artery

disease.  The chest x-ray was significant for acute pulmonary edema due to

acute diastolic heart failure due to hypertension emergency.  The patient

has had hemodialysis this morning and we will follow up with chest x-ray

and BNP in the morning.

2. Dyslipidemia.  The patient will benefit from statin.  Her LDL should

be less than 100 as she is at very high risk for coronary artery disease

in the future.

3. Hypertension emergency.  We will continue with combination of

hydralazine, metoprolol, and clonidine p.r.n.

4. Hemodialysis is the mainstay of therapy for her hypertension.



The total amount of time spent in the intensive care unit of [facility] was 45 minutes.



I would like to thank Dr. Jordan for the courtesy of this

consultation.









  ______________________________________________

  Alli Hart M.D.





DR:  Emeka

D:  11/27/2017 19:25

T:  11/27/2017 20:58

JOB#:  [id number]

CC:
DATE OF CONSULTATION:  11/28/2017



HEMATOLOGY/ONCOLOGY CONSULTATION



CONSULTING PHYSICIAN:  Edy Roach M.D.



REFERRING PHYSICIAN:  Joie Jordan M.D.



ATTENDING PHYSICIAN:  Joie Jordan M.D.



REASON FOR CONSULTATION:  Evaluation of bicytopenia.



IDENTIFYING DATA:  Dear Dr. Jordan,



The patient is a pleasant 43-year-old female with past medical history

significant for worsening shortness of breath in supine position.  No

chest pain noted at this time, however, noted to have elevated blood

pressure, admitted for further evaluation and treatment of hypoxia and

respiratory distress.  Cardiology service was consulted as well as

Pulmonary team.  In addition, noted to have thrombocytopenia as well as

anemia; therefore, Hematology service was consulted for further evaluation

and treatment.



PAST MEDICAL HISTORY:  End-stage renal disease, hypertension, hemodialysis,

and history of diabetes mellitus.



PAST SURGICAL HISTORY:  Port-A-Cath placement and AV shunt

placement.



MEDICATIONS:  Atenolol, Synthroid, Nexium, Lexapro, _____.



ALLERGIES:  Penicillin.



SOCIAL HISTORY:  No alcohol, tobacco, or illicit drug use.



FAMILY HISTORY:  No family history _____ disease.



REVIEW OF SYSTEMS:  CONSTITUTIONAL:  No fever, chills, or night sweats.

SKIN:  No rashes, bumps, or itching.    HEENT:  No headache, hearing or

vision changes.    BREASTS:  No lumps, pain, or discharge.    PULMONARY:

No cough or sputum.  Shortness of breath.    GASTROINTESTINAL:  No nausea,

vomiting, or diarrhea.    GENITOURINARY:  No dysuria, frequency, or

urgency.    MUSCULOSKELETAL:  No joint swelling, muscle pain, or trauma.



PHYSICAL EXAMINATION:

VITAL SIGNS:  Reviewed.

GENERAL:  No acute distress.

PULMONARY:  Decreased breath sounds.

CARDIOVASCULAR:  Regular rate.  No S3 or S4.

ABDOMEN:  Soft and nontender.

EXTREMITIES:  There is 1+ edema.



LABORATORY AND DIAGNOSTIC DATA:  WBC 8.9, hemoglobin 8.5, and hematocrit

25, and platelets 106,000.  Troponin 0.057.  AST 42 and ALT 22.



Imaging reviewed.  X-ray of the chest shows mild improvement,

demonstrated pulmonary opacities _____.



ASSESSMENT AND RECOMMENDATIONS:

1. Bicytopenia.  Obtain anemia workup as well as thrombocytopenia

workup.   _____ ultrasound of the abdomen, potentially could be due to

viral infection as well as antibiotic use, on Zosyn.

2. Anemia secondary to kidney disease.  Continue to closely monitor.

Anemia workup noted to be _____.



3. Leukocytosis, _____ infection.

4. End-stage renal disease, on hemodialysis.

5. Diabetes mellitus type 2.

6. Hypertension, currently is better controlled.

7. Pneumonia, currently improved.









  ______________________________________________

  Edy Roach M.D.





DR:  Oniel

D:  11/28/2017 14:59

T:  11/29/2017 00:45

JOB#:  [id number]

CC:
History of Present Illness


General


Date patient seen:  Nov 27, 2017


Time patient seen:  12:15


Chief Complaint:  Dyspnea/Respdistress





Present Illness


HPI


42 y/o F with hx of ESRD on HD via R arm fistula,anemia, HTN, DM2 presents to 

ED on 11/27 with 1 day of SOB, orthopnea and MCDOWELL. +cough, no productive.





Denies CP, diaphoresis





In the ED found to have pulmonary edema and hyperkalemia; required Bipap 

initially, now off.





Febrile to 101.3 and leukocytosis to 13. Started on IV Zosyn.











Allergies:  


Coded Allergies:  


     PENICILLINS (Verified  Allergy, Unknown, 11/27/17)





Medication History


Scheduled


Atenolol* (Tenormin*), 25 MG ORAL DAILY, (Reported)





Patient History


Healthcare decision maker





Resuscitation status


Full Code


Advanced Directive on File


No


Patient History Narrative


Pmhx as above





Shx: Denies: smoking





Fx non contributory





Review of Systems


All Other Systems:  negative except mentioned in HPI





Physical Exam


Physical Exam Narrative


Status:  awake


HEENT:  atraumatic


Lungs:  +bibasliar crakcles


Abdomen:  non-tender, active bowel sounds


Extremities:  edema


Decubiti:  location, stage





Last 24 Hour Vital Signs








  Date Time  Temp Pulse Resp B/P (MAP) Pulse Ox O2 Delivery O2 Flow Rate FiO2


 


11/27/17 11:00  75 20 128/61 99 Venturi Mask  50


 


11/27/17 10:16     99 Venturi Mask 12.0 50


 


11/27/17 10:16      Venturi Mask 12.0 50


 


11/27/17 10:00  82 27 143/79 100 Venturi Mask  50


 


11/27/17 09:41 99.8       


 


11/27/17 09:15  84 21  99 Facial  40


 


11/27/17 09:00  90 28 156/62 99 Bi-pap 4.0 40


 


11/27/17 08:40  90  159/62    


 


11/27/17 08:00  90      


 


11/27/17 08:00       4.0 40


 


11/27/17 08:00 101.2 90 29 160/66 99 Bi-pap 4.0 40


 


11/27/17 07:00  92 22 156/73 97 Bi-pap 4.0 40


 


11/27/17 06:56     100 Facial  40


 


11/27/17 06:52  97 29   Bi-pap  40


 


11/27/17 06:00  92 22 176/90 97 Bi-pap 4.0 40


 


11/27/17 05:20  105 32  97 Facial  40


 


11/27/17 05:00  102 28 157/79 97 Bi-pap 4.0 40


 


11/27/17 04:30 99.8 106 30 162/70 95 Bi-pap 4.0 40


 


11/27/17 04:21  107      


 


11/27/17 04:15 98.2 92 22 176/90 97 Bi-pap 15.0 100


 


11/27/17 03:50     97  15.0 100


 


11/27/17 03:45 98.2 92 22 176/90 97 Bi-pap 4.0 40


 


11/27/17 02:57    184/96    


 


11/27/17 02:45 98.6 88 22 170/80 96 Bi-pap 4.0 40


 


11/27/17 02:25  83 30  98 Facial  40


 


11/27/17 01:35 98.5 81 26 173/86 97 Bi-pap 4.0 40


 


11/27/17 01:02  88 20   Bi-pap 4.0 40


 


11/27/17 00:58       4.0 40


 


11/27/17 00:55    200/84    


 


11/27/17 00:54    200/84    


 


11/27/17 00:35  99 32   Bi-pap  40


 


11/27/17 00:35 98.8 88 20 200/84 100 Ambu-Bag 4.0 


 


11/27/17 00:35  99 32  98 Facial  40


 


11/27/17 00:13 98.8 85 20 200/84 100 Ambu-Bag 4.0 

















Intake and Output  


 


 11/27/17 11/28/17





 19:00 07:00


 


Intake Total 55 ml 


 


Balance 55 ml 


 


  


 


Intake Oral 25 ml 


 


Other 30 ml 











Laboratory Tests








Test


  11/27/17


00:40 11/27/17


01:45 11/27/17


01:56 11/27/17


07:40


 


White Blood Count


  10.4 K/UL


(4.8-10.8) 


  


  13.3 K/UL


(4.8-10.8)  H


 


Red Blood Count


  3.35 M/UL


(4.20-5.40)  L 


  


  3.18 M/UL


(4.20-5.40)  L


 


Hemoglobin


  10.2 G/DL


(12.0-16.0)  L 


  


  9.7 G/DL


(12.0-16.0)  L


 


Hematocrit


  31.7 %


(37.0-47.0)  L 


  


  30.1 %


(37.0-47.0)  L


 


Mean Corpuscular Volume 95 FL (80-99)     95 FL (80-99)  


 


Mean Corpuscular Hemoglobin


  30.4 PG


(27.0-31.0) 


  


  30.5 PG


(27.0-31.0)


 


Mean Corpuscular Hemoglobin


Concent 32.1 G/DL


(32.0-36.0) 


  


  32.3 G/DL


(32.0-36.0)


 


Red Cell Distribution Width


  13.3 %


(11.6-14.8) 


  


  13.6 %


(11.6-14.8)


 


Platelet Count


  144 K/UL


(150-450)  L 


  


  132 K/UL


(150-450)  L


 


Mean Platelet Volume


  8.7 FL


(6.5-10.1) 


  


  8.6 FL


(6.5-10.1)


 


Neutrophils (%) (Auto)


  81.9 %


(45.0-75.0)  H 


  


  % (45.0-75.0)


 


 


Lymphocytes (%) (Auto)


  12.4 %


(20.0-45.0)  L 


  


  % (20.0-45.0)


 


 


Monocytes (%) (Auto)


  4.3 %


(1.0-10.0) 


  


   % (1.0-10.0)  


 


 


Eosinophils (%) (Auto)


  0.8 %


(0.0-3.0) 


  


   % (0.0-3.0)  


 


 


Basophils (%) (Auto)


  0.6 %


(0.0-2.0) 


  


   % (0.0-2.0)  


 


 


Sodium Level


  137 MMOL/L


(136-145) 


  


  141 MMOL/L


(136-145)


 


Potassium Level


  6.5 MMOL/L


(3.5-5.1)  *H 7.0 MMOL/L


(3.5-5.1)  *H 


  5.5 MMOL/L


(3.5-5.1)  H


 


Chloride Level


  103 MMOL/L


() 


  


  104 MMOL/L


()


 


Carbon Dioxide Level


  22 MMOL/L


(21-32) 


  


  21 MMOL/L


(21-32)


 


Anion Gap


  13 mmol/L


(5-15) 


  


  17 mmol/L


(5-15)  H


 


Blood Urea Nitrogen


  44 mg/dL


(7-18)  H 


  


  51 mg/dL


(7-18)  H


 


Creatinine


  7.1 MG/DL


(0.55-1.30)  H 


  


  7.7 MG/DL


(0.55-1.30)  H


 


Estimat Glomerular Filtration


Rate 6.3 mL/min


(>60) 


  


  5.8 mL/min


(>60)


 


Glucose Level


  120 MG/DL


()  H 


  


  103 MG/DL


()


 


Calcium Level


  9.4 MG/DL


(8.5-10.1) 


  


  9.7 MG/DL


(8.5-10.1)


 


Total Bilirubin


  0.8 MG/DL


(0.2-1.0) 


  


  0.8 MG/DL


(0.2-1.0)


 


Aspartate Amino Transf


(AST/SGOT) 56 U/L (15-37)


H 


  


  42 U/L (15-37)


H


 


Alanine Aminotransferase


(ALT/SGPT) 30 U/L (12-78)


  


  


  22 U/L (12-78)


 


 


Alkaline Phosphatase


  181 U/L


()  H 


  


  159 U/L


()  H


 


Total Creatine Kinase


  164 U/L


() 


  


  


 


 


Creatine Kinase MB


  0.6 NG/ML


(0.0-3.6) 


  


  


 


 


Creatine Kinase MB Relative


Index 0.3  


  


  


  


 


 


Troponin I


  0.012 ng/mL


(0.000-0.056) 


  


  0.067 ng/mL


(0.000-0.056)


 


Pro-B-Type Natriuretic Peptide


  824821 pg/mL


(0-125)  H 


  


  


 


 


Total Protein


  7.9 G/DL


(6.4-8.2) 


  


  7.2 G/DL


(6.4-8.2)


 


Albumin


  4.1 G/DL


(3.4-5.0) 


  


  3.6 G/DL


(3.4-5.0)


 


Globulin 3.8 g/dL     3.6 g/dL  


 


Albumin/Globulin Ratio 1.1 (1.0-2.7)     1.0 (1.0-2.7)  


 


Arterial Blood pH


  


  


  7.374


(7.350-7.450) 


 


 


Arterial Blood Partial


Pressure CO2 


  


  37.1 mmHg


(35.0-45.0) 


 


 


Arterial Blood Partial


Pressure O2 


  


  59.2 mmHg


(75.0-100.0)  L 


 


 


Arterial Blood HCO3


  


  


  21.2 mmol/L


(22.0-26.0)  L 


 


 


Arterial Blood Oxygen


Saturation 


  


  89.4 %


(92.0-98.0)  L 


 


 


Arterial Blood Base Excess   -3.6   


 


Gio Test   Positive   


 


Differential Total Cells


Counted 


  


  


  100  


 


 


Neutrophils % (Manual)    87 % (45-75)  H


 


Lymphocytes % (Manual)    9 % (20-45)  L


 


Monocytes % (Manual)    3 % (1-10)  


 


Eosinophils % (Manual)    0 % (0-3)  


 


Basophils % (Manual)    1 % (0-2)  


 


Band Neutrophils    0 % (0-8)  


 


Platelet Estimate    Decreased  L


 


Platelet Morphology    Normal  


 


Hemoglobin A1c


  


  


  


  5.1 %


(4.3-6.0)


 


Triglycerides Level


  


  


  


  214 MG/DL


()  H


 


Cholesterol Level


  


  


  


  236 MG/DL (<


200)  H


 


LDL Cholesterol


  


  


  


  138 mg/dL


(<100)  H


 


HDL Cholesterol


  


  


  


  56 MG/DL


(40-60)


 


Cholesterol/HDL Ratio    4.2 (3.3-4.4)  








Height (Feet):  5


Height (Inches):  2.00


Weight (Pounds):  113


Medications





Current Medications








 Medications


  (Trade)  Dose


 Ordered  Sig/Weston


 Route


 PRN Reason  Start Time


 Stop Time Status Last Admin


Dose Admin


 


 Acetaminophen


  (Tylenol)  650 mg  Q6H  PRN


 ORAL


 Mild Pain/Temp > 100.5  11/27/17 07:45


 12/27/17 07:44  11/27/17 08:40


 


 


 Clonidine HCl


  (Catapres)  0.1 mg  Q4H  PRN


 ORAL


 bp over 160 syst  11/27/17 12:00


 12/27/17 11:59 UNV  


 


 


 Dextrose


  (Dextrose 50%)    STAT  PRN


 IV


 Hypoglycemia  11/27/17 07:45


 12/27/17 07:44   


 


 


 Hydralazine HCl


  (Apresoline)  10 mg  Q6HR


 ORAL


   11/27/17 12:00


 12/27/17 11:59 UNV  


 


 


 Insulin Aspart


  (NovoLOG)    BEFORE MEALS AND  HS


 SUBQ


   11/27/17 11:30


 12/27/17 11:29  11/27/17 11:21


 


 


 Metoprolol


 Tartrate


  (Lopressor)  12.5 mg  ONCE  ONCE


 ORAL


   11/27/17 12:00


 11/27/17 12:01 UNV  


 


 


 Metoprolol


 Tartrate


  (Lopressor)  12.5 mg  Q12HR


 ORAL


   11/27/17 21:00


 12/27/17 20:59 UNV  


 


 


 Morphine Sulfate


  (Morphine


 Sulfate)  1 mg  Q8H  PRN


 IVP


 For chest pain  11/27/17 08:30


 12/4/17 06:59   


 


 


 Nitroglycerin


  (Ntg)  0.4 mg  Q5M  PRN


 SL


 Prn Chest Pain  11/27/17 07:00


 12/27/17 06:59   


 


 


 Ondansetron HCl


  (Zofran)  4 mg  Q6H  PRN


 IVP


 Nausea & Vomiting  11/27/17 07:00


 12/27/17 06:59   


 


 


 Pantoprazole


  (Protonix)  40 mg  ACBREAKFAST


 ORAL


   11/27/17 09:00


 12/27/17 08:59  11/27/17 08:38


 


 


 Piperacillin Sod/


 Tazobactam Sod


 2.25 gm/Dextrose  55 ml @ 


 110 mls/hr  Q8HR


 IV


   11/27/17 14:00


 12/2/17 13:59   


 











Assessment/Plan


Assessment/Plan


Abx:


Zosyn 11/27-





Assesment:


Pulmonary edema


-?PNA


  -CXR: Bilateral diffuse right greater than left interstitial and alveolar 

infiltrates versus edema





Fever/leukocytosis- possible due to PNA- r/o bacteremia


hyperkalemia


ESRD on HD


Dm2


HTN





Plan:


-Continue Zosyn pending sputum cx


   -seems to be tolerating Zosyn; monitor for rash


-Obtain sputum and blood cultures


-f/u cultures


-Monitor CBC/CMP, temperatures





Thank your for this consultation. Will continue to follow along with you.





Discussed with Mikaela Aguilera M.D. Nov 27, 2017 11:53
airways obstruction and following bronchodilators he had no significant response. Because his FEV1 and FVC are both reduced I cannot rule out a restrictive lung process without further testing      Echocardiogram: No echo available    ASSESSMENT:    1. Nodule of upper lobe of left lung    2. Mild intermittent asthma without complication    3. Obstructive sleep apnea    4. Mediastinal adenopathy          PLAN:  Most likely the findings in his chest and mild mediastinal adenopathy are benign. However I did recommend repeating his CT chest within the next year. He is to see Dr. Ethan Wesley for evaluation of the abnormal PET uptake in the gastric mucosa. [facility] will continue to follow him    We have discussed the need to maintain yearly flu immunization, pneumococcal vaccination. We have discussed Coronavirus precaution including handwashing practice, wiping items touched in public such as gas pumps, door handles, shopping carts, etc. Self monitoring for infection - fever, chills, cough, SOB. Should they develop symptoms they should call office for further instructions. Return in about 8 months (around 12/7/2023) for recheck for lung nodule, recheck for mediastinal adenopathy. This dictation was performed with a verbal recognition program and it was checked for errors. It is possible that there are still dictated errors within this office note. Any errors should be brought immediately to my attention for correction. All efforts were made to ensure that this office note is accurate.

## 2023-05-19 ENCOUNTER — HOSPITAL ENCOUNTER (OUTPATIENT)
Age: 73
Discharge: HOME | End: 2023-05-19
Payer: MEDICARE

## 2023-05-19 DIAGNOSIS — F10.11 ALCOHOL ABUSE, IN REMISSION: ICD-10-CM

## 2023-05-19 DIAGNOSIS — I10 ESSENTIAL HYPERTENSION: ICD-10-CM

## 2023-05-19 DIAGNOSIS — I51.89 DIASTOLIC DYSFUNCTION: ICD-10-CM

## 2023-05-19 DIAGNOSIS — R80.1 PERSISTENT PROTEINURIA: ICD-10-CM

## 2023-05-19 DIAGNOSIS — E11.9 TYPE 2 DIABETES MELLITUS WITHOUT COMPLICATION, WITHOUT LONG-TERM CURRENT USE OF INSULIN (HCC): ICD-10-CM

## 2023-05-19 DIAGNOSIS — I63.119 CEREBROVASCULAR ACCIDENT (CVA) DUE TO EMBOLISM OF VERTEBRAL ARTERY, UNSPECIFIED BLOOD VESSEL LATERALITY (HCC): ICD-10-CM

## 2023-05-19 DIAGNOSIS — R31.1 BENIGN ESSENTIAL MICROSCOPIC HEMATURIA: ICD-10-CM

## 2023-05-19 DIAGNOSIS — N18.31 STAGE 3A CHRONIC KIDNEY DISEASE (HCC): ICD-10-CM

## 2023-05-19 LAB
ALBUMIN SERPL-MCNC: 3.7 GM/DL (ref 3.4–5)
ANION GAP SERPL CALCULATED.3IONS-SCNC: 8 MMOL/L (ref 4–16)
BASOPHILS ABSOLUTE: 0.1 K/CU MM
BASOPHILS RELATIVE PERCENT: 0.6 % (ref 0–1)
BUN SERPL-MCNC: 16 MG/DL (ref 6–23)
CALCIUM SERPL-MCNC: 9 MG/DL (ref 8.3–10.6)
CHLORIDE BLD-SCNC: 92 MMOL/L (ref 99–110)
CO2: 34 MMOL/L (ref 21–32)
CREAT SERPL-MCNC: 1.9 MG/DL (ref 0.9–1.3)
DIFFERENTIAL TYPE: ABNORMAL
EOSINOPHILS ABSOLUTE: 0.2 K/CU MM
EOSINOPHILS RELATIVE PERCENT: 2 % (ref 0–3)
GFR SERPL CREATININE-BSD FRML MDRD: 37 ML/MIN/1.73M2
GLUCOSE SERPL-MCNC: 108 MG/DL (ref 70–99)
HCT VFR BLD CALC: 45.8 % (ref 42–52)
HEMOGLOBIN: 15 GM/DL (ref 13.5–18)
IMMATURE NEUTROPHIL %: 0.6 % (ref 0–0.43)
LYMPHOCYTES ABSOLUTE: 2.3 K/CU MM
LYMPHOCYTES RELATIVE PERCENT: 26.8 % (ref 24–44)
MAGNESIUM: 2.1 MG/DL (ref 1.8–2.4)
MCH RBC QN AUTO: 37.2 PG (ref 27–31)
MCHC RBC AUTO-ENTMCNC: 32.8 % (ref 32–36)
MCV RBC AUTO: 113.6 FL (ref 78–100)
MONOCYTES ABSOLUTE: 0.8 K/CU MM
MONOCYTES RELATIVE PERCENT: 9 % (ref 0–4)
NUCLEATED RBC %: 0 %
PDW BLD-RTO: 14.2 % (ref 11.7–14.9)
PHOSPHORUS: 2.9 MG/DL (ref 2.5–4.9)
PLATELET # BLD: 177 K/CU MM (ref 140–440)
PMV BLD AUTO: 10.2 FL (ref 7.5–11.1)
POTASSIUM SERPL-SCNC: 4.1 MMOL/L (ref 3.5–5.1)
RBC # BLD: 4.03 M/CU MM (ref 4.6–6.2)
SEGMENTED NEUTROPHILS ABSOLUTE COUNT: 5.3 K/CU MM
SEGMENTED NEUTROPHILS RELATIVE PERCENT: 61 % (ref 36–66)
SODIUM BLD-SCNC: 134 MMOL/L (ref 135–145)
TOTAL IMMATURE NEUTOROPHIL: 0.05 K/CU MM
TOTAL NUCLEATED RBC: 0 K/CU MM
WBC # BLD: 8.6 K/CU MM (ref 4–10.5)

## 2023-05-19 PROCEDURE — 80048 BASIC METABOLIC PNL TOTAL CA: CPT

## 2023-05-19 PROCEDURE — 81001 URINALYSIS AUTO W/SCOPE: CPT

## 2023-05-19 PROCEDURE — 84100 ASSAY OF PHOSPHORUS: CPT

## 2023-05-19 PROCEDURE — 82040 ASSAY OF SERUM ALBUMIN: CPT

## 2023-05-19 PROCEDURE — 85025 COMPLETE CBC W/AUTO DIFF WBC: CPT

## 2023-05-19 PROCEDURE — 36415 COLL VENOUS BLD VENIPUNCTURE: CPT

## 2023-05-19 PROCEDURE — 83735 ASSAY OF MAGNESIUM: CPT

## 2023-05-20 ENCOUNTER — HOSPITAL ENCOUNTER (OUTPATIENT)
Age: 73
Setting detail: SPECIMEN
Discharge: HOME | End: 2023-05-20
Payer: MEDICARE

## 2023-05-20 LAB
BACTERIA: ABNORMAL /HPF
BILIRUBIN URINE: NEGATIVE MG/DL
BLOOD, URINE: ABNORMAL
CLARITY: CLEAR
COLOR: YELLOW
CREATININE URINE: 37.4 MG/DL (ref 39–259)
GLUCOSE, URINE: NEGATIVE MG/DL
KETONES, URINE: NEGATIVE MG/DL
LEUKOCYTE ESTERASE, URINE: ABNORMAL
NITRITE URINE, QUANTITATIVE: NEGATIVE
PH, URINE: 6.5 (ref 5–8)
PROT/CREAT RATIO, UR: 0.6
PROTEIN UA: ABNORMAL MG/DL
RBC URINE: 6 /HPF (ref 0–3)
SPECIFIC GRAVITY UA: <1.005 (ref 1–1.03)
SQUAMOUS EPITHELIAL: <1 /HPF
TRICHOMONAS: ABNORMAL /HPF
URINE TOTAL PROTEIN: 22.2 MG/DL
UROBILINOGEN, URINE: 0.2 MG/DL (ref 0.2–1)
WBC UA: 13 /HPF (ref 0–2)

## 2023-05-20 PROCEDURE — 82570 ASSAY OF URINE CREATININE: CPT

## 2023-05-20 PROCEDURE — 84156 ASSAY OF PROTEIN URINE: CPT

## 2023-05-23 PROBLEM — N17.0 ACUTE RENAL FAILURE WITH TUBULAR NECROSIS (HCC): Status: ACTIVE | Noted: 2023-05-23

## 2023-05-23 PROBLEM — K59.01 SLOW TRANSIT CONSTIPATION: Status: ACTIVE | Noted: 2023-05-23

## 2023-05-26 ENCOUNTER — OFFICE VISIT (OUTPATIENT)
Dept: GASTROENTEROLOGY | Age: 73
End: 2023-05-26
Payer: MEDICARE

## 2023-05-26 VITALS
HEART RATE: 96 BPM | HEIGHT: 69 IN | TEMPERATURE: 98 F | BODY MASS INDEX: 34.96 KG/M2 | SYSTOLIC BLOOD PRESSURE: 126 MMHG | DIASTOLIC BLOOD PRESSURE: 64 MMHG | OXYGEN SATURATION: 92 % | WEIGHT: 236 LBS

## 2023-05-26 DIAGNOSIS — R93.3 ABNORMAL CT SCAN, STOMACH: Primary | ICD-10-CM

## 2023-05-26 PROCEDURE — 1123F ACP DISCUSS/DSCN MKR DOCD: CPT | Performed by: SPECIALIST

## 2023-05-26 PROCEDURE — 99204 OFFICE O/P NEW MOD 45 MIN: CPT | Performed by: SPECIALIST

## 2023-05-26 PROCEDURE — 3074F SYST BP LT 130 MM HG: CPT | Performed by: SPECIALIST

## 2023-05-26 PROCEDURE — 3078F DIAST BP <80 MM HG: CPT | Performed by: SPECIALIST

## 2023-05-26 NOTE — PROGRESS NOTES
Gastroenterology Consult Note  Sherice Canseco MD      Reason for Consult:  abnormal stomach on imaging  Primary Care / referring Physician:  Derick Nicole MD      History Obtained From:  patient    CC: abnormal stomach on PET scan    HISTORY OF PRESENT ILLNESS:          Was found to have thickened folds in upper stomach on CT- done for lung nodule. Has borborygmi but no abd pain, nausea, vomiting, unexplained weight loss. Has had occasional diarrhea and constipation. No melena, hematochezia. Has never had colonoscopy but refused that now. Father had colon cancer- he still refuses colonoscopy  Takes occasional ibuprofen     Past Medical History:        Diagnosis Date    Diabetes mellitus ([facility] [address]     Hypertension     Unspecified cerebral artery occlusion with cerebral infarction        Past Surgical History:        Procedure Laterality Date    ACHILLES TENDON SURGERY         Social History:   Social History     Tobacco Use    Smoking status: Never    Smokeless tobacco: Never   Substance Use Topics    Alcohol use: Not Currently     Alcohol/week: 2.0 standard drinks     Types: 2 Glasses of wine per week       Medications:   Prior to Admission medications    Medication Sig Start Date End Date Taking?  Authorizing Provider   potassium chloride (KLOR-CON M) 20 MEQ extended release tablet Take 1 tablet by mouth 3 times daily 5/6/22  Yes Justina Strong MD   Multiple Vitamins-Minerals (PRESERVISION AREDS PO) Take 2 tablets by mouth daily    Yes Historical Provider, MD   fluticasone (FLONASE) 50 MCG/ACT nasal spray 2 sprays by Each Nostril route as needed   Yes Historical Provider, MD   sertraline (ZOLOFT) 100 MG tablet Take 1.5 tablets by mouth daily   Yes Historical Provider, MD   rosuvastatin (CRESTOR) 10 MG tablet Take 1 tablet by mouth daily   Yes Historical Provider, MD   albuterol sulfate  (90 Base) MCG/ACT inhaler Inhale 2 puffs into the lungs every 6 hours as needed for Wheezing   Yes

## 2023-06-03 ENCOUNTER — ANESTHESIA EVENT (OUTPATIENT)
Dept: ENDOSCOPY | Age: 73
End: 2023-06-03
Payer: MEDICARE

## 2023-06-05 NOTE — H&P
Original H &P in soft chart. I have examined the patient immediately before the procedure and there is no change in the previous history and physical exam, which has been reviewed. There is a history of sleep apnea,. There has been no  previous adverse experience with sedation/anesthesia. There is no increased risk for aspiration of gastric contents. The patient has been instructed that all resuscitative measures (during the operative and immediate perioperative period) will be instituted in the unlikely event that they will be needed. The patient has no pertinent past surgical or family history other than listed in the original H&P. The patient was counseled about the risks of janeth Covid-19 during their perioperative period and any recovery window from their procedure. The patient was made aware that janeth Covid-19  may worsen their prognosis for recovering from their procedure  and lend to a higher morbidity and/or mortality risk. All material risks, benefits, and reasonable alternatives including postponing the procedure were discussed. The patient does wish to proceed with the procedure at this time.     ASA Class: 3  AIRWAY Class: 1

## 2023-06-06 ENCOUNTER — HOSPITAL ENCOUNTER (OUTPATIENT)
Age: 73
Setting detail: OUTPATIENT SURGERY
Discharge: HOME | End: 2023-06-06
Attending: SPECIALIST | Admitting: SPECIALIST
Payer: MEDICARE

## 2023-06-06 ENCOUNTER — ANESTHESIA (OUTPATIENT)
Dept: ENDOSCOPY | Age: 73
End: 2023-06-06
Payer: MEDICARE

## 2023-06-06 VITALS
BODY MASS INDEX: 34.96 KG/M2 | WEIGHT: 236 LBS | HEART RATE: 82 BPM | RESPIRATION RATE: 16 BRPM | OXYGEN SATURATION: 94 % | SYSTOLIC BLOOD PRESSURE: 138 MMHG | DIASTOLIC BLOOD PRESSURE: 80 MMHG | HEIGHT: 69 IN | TEMPERATURE: 98.6 F

## 2023-06-06 DIAGNOSIS — R93.3 ABNORMAL CT SCAN, STOMACH: ICD-10-CM

## 2023-06-06 PROBLEM — K29.60 EROSIVE GASTRITIS: Status: ACTIVE | Noted: 2023-06-06

## 2023-06-06 LAB — GLUCOSE BLD-MCNC: 115 MG/DL (ref 70–99)

## 2023-06-06 PROCEDURE — 2709999900 HC NON-CHARGEABLE SUPPLY: Performed by: SPECIALIST

## 2023-06-06 PROCEDURE — 3700000000 HC ANESTHESIA ATTENDED CARE: Performed by: SPECIALIST

## 2023-06-06 PROCEDURE — 3609012400 HC EGD TRANSORAL BIOPSY SINGLE/MULTIPLE: Performed by: SPECIALIST

## 2023-06-06 PROCEDURE — 6360000002 HC RX W HCPCS: Performed by: NURSE ANESTHETIST, CERTIFIED REGISTERED

## 2023-06-06 PROCEDURE — 2580000003 HC RX 258: Performed by: ANESTHESIOLOGY

## 2023-06-06 PROCEDURE — 3700000001 HC ADD 15 MINUTES (ANESTHESIA): Performed by: SPECIALIST

## 2023-06-06 PROCEDURE — 2500000003 HC RX 250 WO HCPCS: Performed by: NURSE ANESTHETIST, CERTIFIED REGISTERED

## 2023-06-06 PROCEDURE — 82962 GLUCOSE BLOOD TEST: CPT

## 2023-06-06 PROCEDURE — 7100000010 HC PHASE II RECOVERY - FIRST 15 MIN: Performed by: SPECIALIST

## 2023-06-06 PROCEDURE — 87077 CULTURE AEROBIC IDENTIFY: CPT

## 2023-06-06 PROCEDURE — 7100000011 HC PHASE II RECOVERY - ADDTL 15 MIN: Performed by: SPECIALIST

## 2023-06-06 RX ORDER — PROPOFOL 10 MG/ML
INJECTION, EMULSION INTRAVENOUS PRN
Status: DISCONTINUED | OUTPATIENT
Start: 2023-06-06 | End: 2023-06-06 | Stop reason: SDUPTHER

## 2023-06-06 RX ORDER — LIDOCAINE HYDROCHLORIDE 20 MG/ML
INJECTION, SOLUTION EPIDURAL; INFILTRATION; INTRACAUDAL; PERINEURAL PRN
Status: DISCONTINUED | OUTPATIENT
Start: 2023-06-06 | End: 2023-06-06 | Stop reason: SDUPTHER

## 2023-06-06 RX ORDER — SODIUM CHLORIDE, SODIUM LACTATE, POTASSIUM CHLORIDE, CALCIUM CHLORIDE 600; 310; 30; 20 MG/100ML; MG/100ML; MG/100ML; MG/100ML
INJECTION, SOLUTION INTRAVENOUS CONTINUOUS
Status: DISCONTINUED | OUTPATIENT
Start: 2023-06-06 | End: 2023-06-07 | Stop reason: HOSPADM

## 2023-06-06 RX ORDER — LABETALOL HYDROCHLORIDE 5 MG/ML
INJECTION, SOLUTION INTRAVENOUS PRN
Status: DISCONTINUED | OUTPATIENT
Start: 2023-06-06 | End: 2023-06-06 | Stop reason: SDUPTHER

## 2023-06-06 RX ORDER — PANTOPRAZOLE SODIUM 40 MG/1
40 TABLET, DELAYED RELEASE ORAL
Qty: 30 TABLET | Refills: 5 | Status: SHIPPED | OUTPATIENT
Start: 2023-06-06

## 2023-06-06 RX ADMIN — PROPOFOL 60 MG: 10 INJECTION, EMULSION INTRAVENOUS at 14:30

## 2023-06-06 RX ADMIN — LIDOCAINE HYDROCHLORIDE 100 MG: 20 INJECTION, SOLUTION EPIDURAL; INFILTRATION; INTRACAUDAL; PERINEURAL at 14:30

## 2023-06-06 RX ADMIN — PROPOFOL 30 MG: 10 INJECTION, EMULSION INTRAVENOUS at 14:34

## 2023-06-06 RX ADMIN — SODIUM CHLORIDE, POTASSIUM CHLORIDE, SODIUM LACTATE AND CALCIUM CHLORIDE: 600; 310; 30; 20 INJECTION, SOLUTION INTRAVENOUS at 13:02

## 2023-06-06 RX ADMIN — LABETALOL HYDROCHLORIDE 10 MG: 5 INJECTION, SOLUTION INTRAVENOUS at 14:37

## 2023-06-06 ASSESSMENT — PAIN SCALES - GENERAL
PAINLEVEL_OUTOF10: 0
PAINLEVEL_OUTOF10: 0

## 2023-06-06 ASSESSMENT — PAIN - FUNCTIONAL ASSESSMENT: PAIN_FUNCTIONAL_ASSESSMENT: 0-10

## 2023-06-06 NOTE — BRIEF OP NOTE
SCANNED COLONOSCOPY REPORT:   The original colonoscopy report with photos in higher definition can be found by going to \"chart review\" then \"procedures\" then double click on \"colonoscopy\"    Impression:         -  Normal esophagus.          - diffuse gastric erythema         - erosive gastritis in the antrum         - no mass or neoplasm identified         - Biopsies were taken of the gastric body and antrum to assay for H pylori by            the [employer]-Test method    Suggest:    - await [employer]-Test

## 2023-06-06 NOTE — ANESTHESIA POSTPROCEDURE EVALUATION
Department of Anesthesiology  Postprocedure Note    Patient: Calin Small MRN: [id number]  YOB: 1950  Date of evaluation: 6/6/2023      Procedure Summary     Date: 06/06/23 Room / Location: [address] [place] [address]    Anesthesia Start: 0853 Anesthesia Stop: 3438    Procedure: EGD BIOPSY Diagnosis:       Abnormal CT scan, stomach      (Abnormal CT scan, stomach [R93.3])    Surgeons: Hilario Mireles MD Responsible Provider: Natalio Mccloud MD    Anesthesia Type: MAC ASA Status: 3          Anesthesia Type: No value filed.     Carolynn Phase I:      Carolynn Phase II:        Anesthesia Post Evaluation    Patient location during evaluation: bedside  Patient participation: complete - patient participated  Level of consciousness: awake and alert  Pain score: 0  Airway patency: patent  Nausea & Vomiting: no nausea  Complications: no  Cardiovascular status: hemodynamically stable  Respiratory status: room air  Hydration status: stable

## 2023-06-06 NOTE — DISCHARGE INSTRUCTIONS
EGD    DR. Didier Wall     OFFICE NUMBER [id number]    FOLLOW UP APPOINTMENT: call office next week for biopsy results     REPEAT PROCEDURE AS  NEEDED. TEST ORDERED:NONE     What to Expect at Home  Your Recovery:  The only discomfort after your EGD is generally limited to a mild soreness of the throat, which may last a day or two. Call your physician immediately if you have severe chest pain, shortness of breath or a temperature of 100 degrees or higher if taken orally. How can you care for yourself at home? Activity  Rest as much as you need to after you go home. You should be able to go back to your usual activities the day after the test.  Diet  Follow your doctor's directions for eating after the test.  Drink plenty of fluids (unless your doctor has told you not to). Medications  If you have a sore throat the day after the test, use an over-the-counter spray to numb your throat. Your doctor will tell you if and when you can restart your medicines. He or she will also give you instructions about taking any new medicines. If you take blood thinners, such as warfarin (Coumadin), clopidogrel (Plavix), or aspirin, be sure to talk to your doctor. He or she will tell you if and when to start taking those medicines again. Make sure that you understand exactly what your doctor wants you to do. If a biopsy was done during the test, your doctor may tell you not to take aspirin or other anti-inflammatory medicines for a few days. These include ibuprofen (Advil, Motrin) and naproxen (Aleve). DO NOT DRINK ANYTHING WITH ALCOHOL TODAY. Other instructions:Anesthesia  For your safety, do not drive or operate machinery for 24 hours. Do not sign legal documents or make major decisions for 24 hours. The anesthesia can make it hard for you to fully understand what you are agreeing to. Follow-up care is a key part of your treatment and safety.  Be sure to make and go to all appointments, and call your doctor if you

## 2023-06-06 NOTE — ANESTHESIA PRE PROCEDURE
Department of Anesthesiology  Preprocedure Note       Name:  Nelda Garcia.   Age:  71 y.o.  :  1950                                          MRN:  [id number]         Date:  2023      Surgeon: Daron Bey):  Marva Casey MD    Procedure: Procedure(s):  EGD ESOPHAGOGASTRODUODENOSCOPY    Medications prior to admission:   Prior to Admission medications    Medication Sig Start Date End Date Taking? Authorizing Provider   lactulose (CHRONULAC) 10 GM/15ML solution Take 15 mLs by mouth 3 times daily  Patient not taking: Reported on 2023   Clementine Basurto MD   tamsulosin (FLOMAX) 0.4 MG capsule Take 1 capsule by mouth daily 23   Clementine Basurto MD   metOLazone (ZAROXOLYN) 2.5 MG tablet Take 1 tablet by mouth 2 times daily 23   Clementine Basurto MD   spironolactone (ALDACTONE) 25 MG tablet TAKE ONE TABLET BY MOUTH TWICE A DAY 22   Teomarie Seth MD   potassium chloride (KLOR-CON M) 20 MEQ extended release tablet Take 1 tablet by mouth 3 times daily 22   Clementine Basurto MD   furosemide (LASIX) 40 MG tablet Take 1 tablet by mouth 2 times daily  Patient not taking: Reported on 2023   Clementine Basurto MD   Multiple Vitamins-Minerals (PRESERVISION AREDS PO) Take 2 tablets by mouth daily     Historical Provider, MD   glipiZIDE (GLUCOTROL) 5 MG tablet TAKE ONE TABLET BY MOUTH TWICE A DAY BEFORE MEALS  Patient not taking: Reported on 2023   Historical Provider, MD   fluticasone (FLONASE) 50 MCG/ACT nasal spray 2 sprays by Each Nostril route as needed    Historical Provider, MD   sertraline (ZOLOFT) 100 MG tablet Take 1.5 tablets by mouth daily    Historical Provider, MD   triamcinolone (KENALOG) 0.1 % lotion Apply topically 3 times daily Apply topically 3 times daily.   Patient not taking: Reported on 2023    Historical Provider, MD   rosuvastatin (CRESTOR) 10 MG tablet Take 1 tablet by mouth daily    Historical Provider, MD   albuterol sulfate HFA

## 2023-06-08 LAB — CLOTEST: NEGATIVE

## 2023-06-20 ENCOUNTER — TELEPHONE (OUTPATIENT)
Dept: GASTROENTEROLOGY | Age: 73
End: 2023-06-20

## 2023-06-26 ENCOUNTER — HOSPITAL ENCOUNTER (OUTPATIENT)
Age: 73
Discharge: HOME | End: 2023-06-26
Payer: MEDICARE

## 2023-06-26 DIAGNOSIS — N18.31 STAGE 3A CHRONIC KIDNEY DISEASE (HCC): ICD-10-CM

## 2023-06-26 DIAGNOSIS — R80.1 PERSISTENT PROTEINURIA: ICD-10-CM

## 2023-06-26 DIAGNOSIS — J45.20 MILD INTERMITTENT ASTHMA WITHOUT COMPLICATION: ICD-10-CM

## 2023-06-26 DIAGNOSIS — I10 ESSENTIAL HYPERTENSION: ICD-10-CM

## 2023-06-26 DIAGNOSIS — E11.9 TYPE 2 DIABETES MELLITUS WITHOUT COMPLICATION, WITHOUT LONG-TERM CURRENT USE OF INSULIN (HCC): ICD-10-CM

## 2023-06-26 DIAGNOSIS — N17.0 ACUTE RENAL FAILURE WITH TUBULAR NECROSIS (HCC): ICD-10-CM

## 2023-06-26 DIAGNOSIS — K59.01 SLOW TRANSIT CONSTIPATION: ICD-10-CM

## 2023-06-26 DIAGNOSIS — I51.89 DIASTOLIC DYSFUNCTION: ICD-10-CM

## 2023-06-26 LAB
ALBUMIN SERPL-MCNC: 3.5 GM/DL (ref 3.4–5)
ANION GAP SERPL CALCULATED.3IONS-SCNC: 10 MMOL/L (ref 4–16)
BACTERIA: NEGATIVE /HPF
BILIRUBIN URINE: NEGATIVE MG/DL
BLOOD, URINE: ABNORMAL
BUN SERPL-MCNC: 13 MG/DL (ref 6–23)
CALCIUM SERPL-MCNC: 8.9 MG/DL (ref 8.3–10.6)
CHLORIDE BLD-SCNC: 91 MMOL/L (ref 99–110)
CLARITY: CLEAR
CO2: 32 MMOL/L (ref 21–32)
COLOR: YELLOW
CREAT SERPL-MCNC: 1.6 MG/DL (ref 0.9–1.3)
CREATININE URINE: 72.3 MG/DL (ref 39–259)
GFR SERPL CREATININE-BSD FRML MDRD: 46 ML/MIN/1.73M2
GLUCOSE SERPL-MCNC: 189 MG/DL (ref 70–99)
GLUCOSE, URINE: NEGATIVE MG/DL
KETONES, URINE: NEGATIVE MG/DL
LEUKOCYTE ESTERASE, URINE: NEGATIVE
MAGNESIUM: 1.7 MG/DL (ref 1.8–2.4)
MUCUS: ABNORMAL HPF
NITRITE URINE, QUANTITATIVE: NEGATIVE
PH, URINE: 6.5 (ref 5–8)
PHOSPHORUS: 2.7 MG/DL (ref 2.5–4.9)
POTASSIUM SERPL-SCNC: 3.9 MMOL/L (ref 3.5–5.1)
PROT/CREAT RATIO, UR: 0.4
PROTEIN UA: ABNORMAL MG/DL
RBC URINE: <1 /HPF (ref 0–3)
SODIUM BLD-SCNC: 133 MMOL/L (ref 135–145)
SPECIFIC GRAVITY UA: 1.01 (ref 1–1.03)
SQUAMOUS EPITHELIAL: <1 /HPF
TRICHOMONAS: ABNORMAL /HPF
URINE TOTAL PROTEIN: 29.6 MG/DL
UROBILINOGEN, URINE: 1 MG/DL (ref 0.2–1)
WBC UA: <1 /HPF (ref 0–2)

## 2023-06-26 PROCEDURE — 82040 ASSAY OF SERUM ALBUMIN: CPT

## 2023-06-26 PROCEDURE — 84156 ASSAY OF PROTEIN URINE: CPT

## 2023-06-26 PROCEDURE — 83970 ASSAY OF PARATHORMONE: CPT

## 2023-06-26 PROCEDURE — 84100 ASSAY OF PHOSPHORUS: CPT

## 2023-06-26 PROCEDURE — 80048 BASIC METABOLIC PNL TOTAL CA: CPT

## 2023-06-26 PROCEDURE — 81001 URINALYSIS AUTO W/SCOPE: CPT

## 2023-06-26 PROCEDURE — 82570 ASSAY OF URINE CREATININE: CPT

## 2023-06-26 PROCEDURE — 83735 ASSAY OF MAGNESIUM: CPT

## 2023-06-26 PROCEDURE — 36415 COLL VENOUS BLD VENIPUNCTURE: CPT

## 2023-07-03 LAB
REASON FOR REJECTION: NORMAL
REJECTED TEST: NORMAL

## 2023-07-05 ENCOUNTER — HOSPITAL ENCOUNTER (OUTPATIENT)
Age: 73
Setting detail: SPECIMEN
Discharge: HOME | End: 2023-07-05
Payer: MEDICARE

## 2023-07-05 PROCEDURE — 83970 ASSAY OF PARATHORMONE: CPT

## 2023-07-07 LAB — PTH-INTACT SERPL-MCNC: 74 PG/ML (ref 15–65)

## 2023-08-05 ENCOUNTER — HOSPITAL ENCOUNTER (OUTPATIENT)
Age: 73
Discharge: HOME | End: 2023-08-05
Payer: MEDICARE

## 2023-08-05 DIAGNOSIS — I10 ESSENTIAL HYPERTENSION: ICD-10-CM

## 2023-08-05 DIAGNOSIS — E11.9 TYPE 2 DIABETES MELLITUS WITHOUT COMPLICATION, WITHOUT LONG-TERM CURRENT USE OF INSULIN (HCC): ICD-10-CM

## 2023-08-05 DIAGNOSIS — N18.31 STAGE 3A CHRONIC KIDNEY DISEASE (HCC): ICD-10-CM

## 2023-08-05 DIAGNOSIS — R31.1 BENIGN ESSENTIAL MICROSCOPIC HEMATURIA: ICD-10-CM

## 2023-08-05 LAB
ALBUMIN SERPL-MCNC: 3.5 GM/DL (ref 3.4–5)
ALP BLD-CCNC: 155 IU/L (ref 40–129)
ALT SERPL-CCNC: 19 U/L (ref 10–40)
ANION GAP SERPL CALCULATED.3IONS-SCNC: 9 MMOL/L (ref 4–16)
AST SERPL-CCNC: 40 IU/L (ref 15–37)
BACTERIA: NEGATIVE /HPF
BILIRUB SERPL-MCNC: 0.6 MG/DL (ref 0–1)
BILIRUBIN URINE: NEGATIVE MG/DL
BLOOD, URINE: ABNORMAL
BUN SERPL-MCNC: 12 MG/DL (ref 6–23)
CALCIUM SERPL-MCNC: 8.7 MG/DL (ref 8.3–10.6)
CHLORIDE BLD-SCNC: 92 MMOL/L (ref 99–110)
CLARITY: CLEAR
CO2: 35 MMOL/L (ref 21–32)
COLOR: YELLOW
CREAT SERPL-MCNC: 1.5 MG/DL (ref 0.9–1.3)
CREATININE URINE: 157.7 MG/DL (ref 39–259)
GFR SERPL CREATININE-BSD FRML MDRD: 49 ML/MIN/1.73M2
GLUCOSE SERPL-MCNC: 149 MG/DL (ref 70–99)
GLUCOSE, URINE: NEGATIVE MG/DL
HYALINE CASTS: 2 /LPF
KETONES, URINE: NEGATIVE MG/DL
LEUKOCYTE ESTERASE, URINE: NEGATIVE
MAGNESIUM: 2.1 MG/DL (ref 1.8–2.4)
NITRITE URINE, QUANTITATIVE: NEGATIVE
PH, URINE: 6 (ref 5–8)
PHOSPHORUS: 2.2 MG/DL (ref 2.5–4.9)
POTASSIUM SERPL-SCNC: 3.2 MMOL/L (ref 3.5–5.1)
PROT/CREAT RATIO, UR: 0.4
PROTEIN UA: 30 MG/DL
RBC URINE: 0 /HPF (ref 0–3)
SODIUM BLD-SCNC: 136 MMOL/L (ref 135–145)
SPECIFIC GRAVITY UA: 1.01 (ref 1–1.03)
SQUAMOUS EPITHELIAL: <1 /HPF
TOTAL PROTEIN: 6.6 GM/DL (ref 6.4–8.2)
TRICHOMONAS: NORMAL /HPF
URINE TOTAL PROTEIN: 68.6 MG/DL
UROBILINOGEN, URINE: 1 MG/DL (ref 0.2–1)
WBC UA: <1 /HPF (ref 0–2)

## 2023-08-05 PROCEDURE — 82570 ASSAY OF URINE CREATININE: CPT

## 2023-08-05 PROCEDURE — 84156 ASSAY OF PROTEIN URINE: CPT

## 2023-08-05 PROCEDURE — 81001 URINALYSIS AUTO W/SCOPE: CPT

## 2023-08-05 PROCEDURE — 36415 COLL VENOUS BLD VENIPUNCTURE: CPT

## 2023-08-05 PROCEDURE — 84100 ASSAY OF PHOSPHORUS: CPT

## 2023-08-05 PROCEDURE — 83970 ASSAY OF PARATHORMONE: CPT

## 2023-08-05 PROCEDURE — 83735 ASSAY OF MAGNESIUM: CPT

## 2023-08-05 PROCEDURE — 80053 COMPREHEN METABOLIC PANEL: CPT

## 2023-08-07 ENCOUNTER — APPOINTMENT (OUTPATIENT)
Dept: CT IMAGING | Age: 73
End: 2023-08-07
Payer: MEDICARE

## 2023-08-07 ENCOUNTER — HOSPITAL ENCOUNTER (EMERGENCY)
Age: 73
Discharge: ANOTHER HOSPITAL | End: 2023-08-08
Attending: EMERGENCY MEDICINE
Payer: MEDICARE

## 2023-08-07 DIAGNOSIS — S06.5X0A TRAUMATIC SUBDURAL HEMATOMA WITHOUT LOSS OF CONSCIOUSNESS, INITIAL ENCOUNTER (HCC): Primary | ICD-10-CM

## 2023-08-07 DIAGNOSIS — S06.6X0A SUBARACHNOID HEMORRHAGE FOLLOWING INJURY, NO LOSS OF CONSCIOUSNESS, INITIAL ENCOUNTER (HCC): ICD-10-CM

## 2023-08-07 PROCEDURE — 72131 CT LUMBAR SPINE W/O DYE: CPT

## 2023-08-07 PROCEDURE — 72125 CT NECK SPINE W/O DYE: CPT

## 2023-08-07 PROCEDURE — 72128 CT CHEST SPINE W/O DYE: CPT

## 2023-08-07 PROCEDURE — 70450 CT HEAD/BRAIN W/O DYE: CPT

## 2023-08-07 PROCEDURE — 99285 EMERGENCY DEPT VISIT HI MDM: CPT

## 2023-08-08 VITALS
RESPIRATION RATE: 16 BRPM | WEIGHT: 236 LBS | OXYGEN SATURATION: 100 % | TEMPERATURE: 98.3 F | DIASTOLIC BLOOD PRESSURE: 86 MMHG | SYSTOLIC BLOOD PRESSURE: 151 MMHG | HEART RATE: 83 BPM | BODY MASS INDEX: 34.85 KG/M2

## 2023-08-08 LAB
ALBUMIN SERPL-MCNC: 3.6 GM/DL (ref 3.4–5)
ALP BLD-CCNC: 139 IU/L (ref 40–129)
ALT SERPL-CCNC: 19 U/L (ref 10–40)
ANION GAP SERPL CALCULATED.3IONS-SCNC: 8 MMOL/L (ref 4–16)
AST SERPL-CCNC: 35 IU/L (ref 15–37)
BASOPHILS ABSOLUTE: 0 K/CU MM
BASOPHILS RELATIVE PERCENT: 0.1 % (ref 0–1)
BILIRUB SERPL-MCNC: 0.8 MG/DL (ref 0–1)
BUN SERPL-MCNC: 16 MG/DL (ref 6–23)
CALCIUM SERPL-MCNC: 8.9 MG/DL (ref 8.3–10.6)
CHLORIDE BLD-SCNC: 93 MMOL/L (ref 99–110)
CO2: 34 MMOL/L (ref 21–32)
CREAT SERPL-MCNC: 1.4 MG/DL (ref 0.9–1.3)
DIFFERENTIAL TYPE: ABNORMAL
EOSINOPHILS ABSOLUTE: 0 K/CU MM
EOSINOPHILS RELATIVE PERCENT: 0.1 % (ref 0–3)
GFR SERPL CREATININE-BSD FRML MDRD: 54 ML/MIN/1.73M2
GLUCOSE SERPL-MCNC: 174 MG/DL (ref 70–99)
HCT VFR BLD CALC: 42.2 % (ref 42–52)
HEMOGLOBIN: 13.7 GM/DL (ref 13.5–18)
IMMATURE NEUTROPHIL %: 0.7 % (ref 0–0.43)
INR BLD: 1 INDEX
LYMPHOCYTES ABSOLUTE: 2 K/CU MM
LYMPHOCYTES RELATIVE PERCENT: 15.9 % (ref 24–44)
MCH RBC QN AUTO: 36.8 PG (ref 27–31)
MCHC RBC AUTO-ENTMCNC: 32.5 % (ref 32–36)
MCV RBC AUTO: 113.4 FL (ref 78–100)
MONOCYTES ABSOLUTE: 0.5 K/CU MM
MONOCYTES RELATIVE PERCENT: 4 % (ref 0–4)
NUCLEATED RBC %: 0 %
PDW BLD-RTO: 16.4 % (ref 11.7–14.9)
PLATELET # BLD: 144 K/CU MM (ref 140–440)
PMV BLD AUTO: 9.4 FL (ref 7.5–11.1)
POTASSIUM SERPL-SCNC: 4.4 MMOL/L (ref 3.5–5.1)
PROTHROMBIN TIME: 13.7 SECONDS (ref 11.7–14.5)
RBC # BLD: 3.72 M/CU MM (ref 4.6–6.2)
SEGMENTED NEUTROPHILS ABSOLUTE COUNT: 10.2 K/CU MM
SEGMENTED NEUTROPHILS RELATIVE PERCENT: 79.2 % (ref 36–66)
SODIUM BLD-SCNC: 135 MMOL/L (ref 135–145)
TOTAL IMMATURE NEUTOROPHIL: 0.09 K/CU MM
TOTAL NUCLEATED RBC: 0 K/CU MM
TOTAL PROTEIN: 7.1 GM/DL (ref 6.4–8.2)
WBC # BLD: 12.8 K/CU MM (ref 4–10.5)

## 2023-08-08 PROCEDURE — 85610 PROTHROMBIN TIME: CPT

## 2023-08-08 PROCEDURE — 85025 COMPLETE CBC W/AUTO DIFF WBC: CPT

## 2023-08-08 PROCEDURE — 80053 COMPREHEN METABOLIC PANEL: CPT

## 2023-08-08 NOTE — ED TRIAGE NOTES
Pt fell while walking his dog and hit his head on the grass. Pt stopped taking Warfarin 3 days ago, pt on 2L O2, suspected COPD. Denies LOC, headache but supports back pain.

## 2023-08-08 NOTE — ED PROVIDER NOTES
Triage Chief Complaint:   Fall    [place]:  Sierra Smith is a 71 y.o. male that presents via EMS after a fall. The patient states that he was outside walking his dog when he got tingled up in the dog's leash, tripped and fell backward landing directly on his thoracic back. Denies any concern or losing consciousness. States that he had some difficulty getting up so EMS was called for lift assist.  He was brought here for evaluation. States that he has some midthoracic back pain but denies any headache, neck pain, lower back pain, chest or abdominal pain. States that he has been off of his Coumadin for history of CVA for about 3 to 4 days. Denies motor or sensory deficit. No other acute complaints. ROS:  At least 10 systems reviewed and otherwise acutely negative except as in the [address]     Past Medical History:   Diagnosis Date    Diabetes mellitus ([address])     Hypertension     Kidney disease     patient sees Dr Jimbo Shine    Unspecified cerebral artery occlusion with cerebral infarction      Past Surgical History:   Procedure Laterality Date    ACHILLES TENDON SURGERY Right     QUADRACEPS TENDON REPAIR Right     UPPER GASTROINTESTINAL ENDOSCOPY N/A 6/6/2023    EGD BIOPSY performed by Devika May MD at [facility] ENDOSCOPY     Family History   Problem Relation Age of Onset    Diabetes Mother     Cancer Mother     Heart Disease Father     Cancer Father      Social History     Socioeconomic History    Marital status:      Spouse name: Not on file    Number of children: Not on file    Years of education: Not on file    Highest education level: Not on file   Occupational History    Not on file   Tobacco Use    Smoking status: Never    Smokeless tobacco: Never   Vaping Use    Vaping Use: Never used   Substance and Sexual Activity    Alcohol use: Not Currently     Alcohol/week: 2.0 standard drinks     Types: 2 Glasses of wine per week    Drug use: No    Sexual activity: Yes     Partners: Female   Other Topics

## 2023-08-09 LAB — PTH-INTACT SERPL-MCNC: 55 PG/ML (ref 15–65)

## 2023-09-05 ENCOUNTER — OFFICE VISIT (OUTPATIENT)
Dept: CARDIOLOGY CLINIC | Age: 73
End: 2023-09-05
Payer: MEDICARE

## 2023-09-05 ENCOUNTER — NURSE ONLY (OUTPATIENT)
Dept: CARDIOLOGY CLINIC | Age: 73
End: 2023-09-05

## 2023-09-05 VITALS
DIASTOLIC BLOOD PRESSURE: 70 MMHG | HEIGHT: 68 IN | WEIGHT: 224.5 LBS | SYSTOLIC BLOOD PRESSURE: 139 MMHG | BODY MASS INDEX: 34.02 KG/M2 | HEART RATE: 96 BPM

## 2023-09-05 DIAGNOSIS — I10 ESSENTIAL HYPERTENSION: Primary | ICD-10-CM

## 2023-09-05 DIAGNOSIS — R06.02 SHORTNESS OF BREATH: ICD-10-CM

## 2023-09-05 PROCEDURE — 3075F SYST BP GE 130 - 139MM HG: CPT | Performed by: INTERNAL MEDICINE

## 2023-09-05 PROCEDURE — 93000 ELECTROCARDIOGRAM COMPLETE: CPT | Performed by: INTERNAL MEDICINE

## 2023-09-05 PROCEDURE — 99204 OFFICE O/P NEW MOD 45 MIN: CPT | Performed by: INTERNAL MEDICINE

## 2023-09-05 PROCEDURE — 3078F DIAST BP <80 MM HG: CPT | Performed by: INTERNAL MEDICINE

## 2023-09-05 RX ORDER — LEVETIRACETAM 500 MG/1
TABLET ORAL
COMMUNITY
Start: 2023-08-09

## 2023-09-05 NOTE — PROGRESS NOTES
Applied @ 9:30, 24hr holter w/monitor# [id number] for Dx of tachycardia. Educated pt on proper holter usage; how to keep sx diary; & when to bring monitor back to office. Pt voiced understanding. Holter order,including monitor & card#, & time started, to front nurse's station in 's in-box.

## 2023-09-05 NOTE — PROGRESS NOTES
CARDIOLOGY CONSULT NOTE   Reason for consultation:  Shortness of breath    Referring physician:   Marlena Borja MD     Primary care physician: Marlena Borja MD      Dear  Marlena Borja MD  Thanks for the consult. History of present illness:Balta is a 68 y. o.year old who  presents with for shortness of breath which is moderate, for many years, intermittent, self limiting, not associated with cough or fever, gets worse with activity and better with rest,he had seen pulmonologist and had ruled out lung cancer, has second hand COPD. he had head injury when he fell down chasing his dog and had subdural and subarachnoid hemorrhage. he had 3 CT scan of head and his hemmorrhage has not resolved. 25 yr ago, had [facility] and was normal, however, had stroke 2 week after it and regained his vision soon after i  Chief Complaint   Patient presents with    New Patient     Low blood pressure    Shortness of Breath     History, asthma     Other     Had a fall 8/2023 had brain bleed was in [facility]     Blood pressure, cholesterol, blood glucose and weight are well controlled. Past medical history:    has a past medical history of Diabetes mellitus ([address]), Hypertension, Kidney disease, and Unspecified cerebral artery occlusion with cerebral infarction. Past surgical history:   has a past surgical history that includes Achilles tendon surgery (Right); Quadraceps tendon repair (Right); and Upper gastrointestinal endoscopy (N/A, 6/6/2023). Social History:   reports that he has never smoked. He has never used smokeless tobacco. He reports that he does not currently use alcohol after a past usage of about 2.0 standard drinks per week. He reports that he does not use drugs.   Family history:   no family history of CAD, STROKE of DM    Allergies   Allergen Reactions    Bee Venom     Pcn [Penicillins] Other (See Comments)     \"freezing up\"    Strawberry Extract     Tetanus Toxoids     Tetracyclines &

## 2023-09-12 ENCOUNTER — OFFICE VISIT (OUTPATIENT)
Dept: PULMONOLOGY | Age: 73
End: 2023-09-12
Payer: MEDICARE

## 2023-09-12 VITALS
HEIGHT: 68 IN | HEART RATE: 97 BPM | DIASTOLIC BLOOD PRESSURE: 60 MMHG | BODY MASS INDEX: 33.97 KG/M2 | WEIGHT: 224.13 LBS | RESPIRATION RATE: 22 BRPM | SYSTOLIC BLOOD PRESSURE: 109 MMHG | OXYGEN SATURATION: 96 %

## 2023-09-12 DIAGNOSIS — G47.33 OBSTRUCTIVE SLEEP APNEA: ICD-10-CM

## 2023-09-12 DIAGNOSIS — R06.81 BREATHLESSNESS ON EXERTION: Primary | ICD-10-CM

## 2023-09-12 DIAGNOSIS — J45.20 MILD INTERMITTENT ASTHMA WITHOUT COMPLICATION: ICD-10-CM

## 2023-09-12 DIAGNOSIS — R91.1 NODULE OF UPPER LOBE OF LEFT LUNG: ICD-10-CM

## 2023-09-12 PROCEDURE — 1123F ACP DISCUSS/DSCN MKR DOCD: CPT | Performed by: NURSE PRACTITIONER

## 2023-09-12 PROCEDURE — 3078F DIAST BP <80 MM HG: CPT | Performed by: NURSE PRACTITIONER

## 2023-09-12 PROCEDURE — 99214 OFFICE O/P EST MOD 30 MIN: CPT | Performed by: NURSE PRACTITIONER

## 2023-09-12 PROCEDURE — 3074F SYST BP LT 130 MM HG: CPT | Performed by: NURSE PRACTITIONER

## 2023-09-12 RX ORDER — MONTELUKAST SODIUM 10 MG/1
10 TABLET ORAL DAILY
Qty: 15 TABLET | Refills: 0 | Status: SHIPPED | OUTPATIENT
Start: 2023-09-12

## 2023-09-12 ASSESSMENT — ENCOUNTER SYMPTOMS: SHORTNESS OF BREATH: 1

## 2023-09-13 ENCOUNTER — HOSPITAL ENCOUNTER (OUTPATIENT)
Dept: GENERAL RADIOLOGY | Age: 73
Discharge: HOME | End: 2023-09-13
Payer: MEDICARE

## 2023-09-13 ENCOUNTER — HOSPITAL ENCOUNTER (OUTPATIENT)
Age: 73
Discharge: HOME | End: 2023-09-13
Payer: MEDICARE

## 2023-09-13 DIAGNOSIS — R06.81 BREATHLESSNESS ON EXERTION: ICD-10-CM

## 2023-09-13 PROCEDURE — 71046 X-RAY EXAM CHEST 2 VIEWS: CPT

## 2023-09-13 ASSESSMENT — ENCOUNTER SYMPTOMS: SHORTNESS OF BREATH: 1

## 2023-09-14 DIAGNOSIS — J18.9 PNEUMONIA OF RIGHT LOWER LOBE DUE TO INFECTIOUS ORGANISM: Primary | ICD-10-CM

## 2023-09-14 RX ORDER — CLINDAMYCIN HYDROCHLORIDE 300 MG/1
300 CAPSULE ORAL 3 TIMES DAILY
Qty: 21 CAPSULE | Refills: 0 | Status: SHIPPED | OUTPATIENT
Start: 2023-09-14 | End: 2023-09-21

## 2023-09-14 RX ORDER — PREDNISONE 5 MG/1
TABLET ORAL
Qty: 20 TABLET | Refills: 0 | Status: SHIPPED | OUTPATIENT
Start: 2023-09-14

## 2023-09-14 NOTE — PROGRESS NOTES
I spoke with Balta's wife, Mathieu Hagen regarding his xray results. Called in Clindamycin and prednisone due to results of xray chest. Right side pleural effusion, atelectasis, sob, and  possible   pneumonia. Mathieu Georgenett states that Sugey Krishna is doing better today. He was napping during the time of the call. I discussed with Mathieu Hagen to help Sugey Krishna with use of incentive spirometer doing breathing exercises to help expand lungs and improve breathing and movement of secretions.

## 2023-09-14 NOTE — RESULT ENCOUNTER NOTE
I have reviewed results of the chest xray. I have made several attempts to notify both Evonne Contreras and spouse, Lazara Barragan of the results. LVM.

## 2023-09-15 ENCOUNTER — TELEPHONE (OUTPATIENT)
Dept: PULMONOLOGY | Age: 73
End: 2023-09-15

## 2023-09-15 NOTE — TELEPHONE ENCOUNTER
Patient called in wanting to talk to you about the meds that you ordered for him, he has questions and states that his wife is also still wanting to talk to you.

## 2023-09-19 ENCOUNTER — TELEPHONE (OUTPATIENT)
Dept: CARDIOLOGY CLINIC | Age: 73
End: 2023-09-19

## 2023-09-19 DIAGNOSIS — J18.9 PNEUMONIA OF RIGHT LOWER LOBE DUE TO INFECTIOUS ORGANISM: ICD-10-CM

## 2023-09-19 RX ORDER — MONTELUKAST SODIUM 10 MG/1
10 TABLET ORAL DAILY
Qty: 30 TABLET | Refills: 5 | Status: SHIPPED | OUTPATIENT
Start: 2023-09-19

## 2023-09-19 RX ORDER — UMECLIDINIUM BROMIDE AND VILANTEROL TRIFENATATE 62.5; 25 UG/1; UG/1
1 POWDER RESPIRATORY (INHALATION) DAILY
Qty: 1 EACH | Refills: 5 | Status: SHIPPED | OUTPATIENT
Start: 2023-09-19

## 2023-09-19 RX ORDER — CLINDAMYCIN HYDROCHLORIDE 300 MG/1
300 CAPSULE ORAL 3 TIMES DAILY
Qty: 21 CAPSULE | Refills: 0 | Status: SHIPPED | OUTPATIENT
Start: 2023-09-19 | End: 2023-09-26

## 2023-09-19 NOTE — PROGRESS NOTES
I spoke wit Samuel Bryant regarding how the medication is working. He had called to report that he is feeling much better. I did give him another 7 days of clindamycin for a total of 14 days. He is finishing up the steroid. He states that Anoro is working well for him and that he would like a prescription for it. I prescribed Anoro one puff daily. Xray Chest results  HISTORY:  ORDERING SYSTEM PROVIDED HISTORY: Breathlessness on exertion  TECHNOLOGIST PROVIDED HISTORY:  Reason for exam:->SOB  feeling like someone is beating chest with a sledge  hammer. FINDINGS:  Heart size stable. Right pleural effusion. Right basilar opacity. No  pneumothorax. No pulmonary vascular congestion or edema. IMPRESSION:  Right pleural effusion with right basilar opacity that could represent  atelectasis or infection     The findings were sent to the Radiology Results WeStudy.In at 11:10  am on 9/13/2023 to be communicated to a licensed caregiver. After medication has been completed, I will get a repeat chest xray.

## 2023-09-19 NOTE — TELEPHONE ENCOUNTER
Spoke with patient regarding shortness of breath. Patient states that his lungs are bad and that Dr. Jenny Almonte is ordering so medication for his lung. Patient states he can not lie down right now for an echo. Patient states he will discuss this with Dr. Brandon Beltran at his next appointment.

## 2023-09-21 ENCOUNTER — TELEPHONE (OUTPATIENT)
Dept: CARDIOLOGY CLINIC | Age: 73
End: 2023-09-21

## 2023-09-21 NOTE — TELEPHONE ENCOUNTER
Please schedule patient with Dr. Alford Hint to discuss afib on holter monitor. Message to pool to schedule.

## 2023-09-27 ENCOUNTER — OFFICE VISIT (OUTPATIENT)
Dept: PULMONOLOGY | Age: 73
End: 2023-09-27
Payer: MEDICARE

## 2023-09-27 VITALS
RESPIRATION RATE: 16 BRPM | DIASTOLIC BLOOD PRESSURE: 70 MMHG | BODY MASS INDEX: 35.01 KG/M2 | WEIGHT: 231 LBS | SYSTOLIC BLOOD PRESSURE: 126 MMHG | OXYGEN SATURATION: 91 % | HEIGHT: 68 IN | HEART RATE: 97 BPM

## 2023-09-27 DIAGNOSIS — J45.20 MILD INTERMITTENT ASTHMA WITHOUT COMPLICATION: Primary | ICD-10-CM

## 2023-09-27 DIAGNOSIS — J90 RECURRENT RIGHT PLEURAL EFFUSION: ICD-10-CM

## 2023-09-27 DIAGNOSIS — R91.1 NODULE OF UPPER LOBE OF LEFT LUNG: ICD-10-CM

## 2023-09-27 DIAGNOSIS — J90 PLEURISY WITH EFFUSION: ICD-10-CM

## 2023-09-27 DIAGNOSIS — R59.0 MEDIASTINAL ADENOPATHY: ICD-10-CM

## 2023-09-27 PROCEDURE — 3078F DIAST BP <80 MM HG: CPT | Performed by: INTERNAL MEDICINE

## 2023-09-27 PROCEDURE — 99213 OFFICE O/P EST LOW 20 MIN: CPT | Performed by: INTERNAL MEDICINE

## 2023-09-27 PROCEDURE — 3074F SYST BP LT 130 MM HG: CPT | Performed by: INTERNAL MEDICINE

## 2023-09-27 PROCEDURE — 1123F ACP DISCUSS/DSCN MKR DOCD: CPT | Performed by: INTERNAL MEDICINE

## 2023-09-27 RX ORDER — ALBUTEROL SULFATE 90 UG/1
2 AEROSOL, METERED RESPIRATORY (INHALATION) EVERY 6 HOURS PRN
Qty: 18 G | Refills: 3 | Status: SHIPPED | OUTPATIENT
Start: 2023-09-27 | End: 2023-12-26

## 2023-09-27 RX ORDER — ALBUTEROL SULFATE 90 UG/1
2 AEROSOL, METERED RESPIRATORY (INHALATION) EVERY 4 HOURS PRN
Qty: 1 EACH | Refills: 11 | Status: SHIPPED | OUTPATIENT
Start: 2023-09-27

## 2023-10-05 ENCOUNTER — APPOINTMENT (OUTPATIENT)
Dept: GENERAL RADIOLOGY | Age: 73
DRG: 871 | End: 2023-10-05
Payer: MEDICARE

## 2023-10-05 ENCOUNTER — HOSPITAL ENCOUNTER (INPATIENT)
Age: 73
LOS: 3 days | Discharge: HOME | DRG: 871 | End: 2023-10-08
Attending: STUDENT IN AN ORGANIZED HEALTH CARE EDUCATION/TRAINING PROGRAM | Admitting: STUDENT IN AN ORGANIZED HEALTH CARE EDUCATION/TRAINING PROGRAM
Payer: MEDICARE

## 2023-10-05 DIAGNOSIS — J18.9 PNEUMONIA OF BOTH LUNGS DUE TO INFECTIOUS ORGANISM, UNSPECIFIED PART OF LUNG: ICD-10-CM

## 2023-10-05 DIAGNOSIS — N17.9 AKI (ACUTE KIDNEY INJURY) (HCC): ICD-10-CM

## 2023-10-05 DIAGNOSIS — A41.9 SEPSIS WITH ACUTE HYPOXIC RESPIRATORY FAILURE WITHOUT SEPTIC SHOCK, DUE TO UNSPECIFIED ORGANISM (HCC): Primary | ICD-10-CM

## 2023-10-05 DIAGNOSIS — R79.89 ELEVATED BRAIN NATRIURETIC PEPTIDE (BNP) LEVEL: ICD-10-CM

## 2023-10-05 DIAGNOSIS — R79.89 ELEVATED TROPONIN: ICD-10-CM

## 2023-10-05 DIAGNOSIS — R65.20 SEPSIS WITH ACUTE HYPOXIC RESPIRATORY FAILURE WITHOUT SEPTIC SHOCK, DUE TO UNSPECIFIED ORGANISM (HCC): Primary | ICD-10-CM

## 2023-10-05 DIAGNOSIS — J10.1 INFLUENZA A: ICD-10-CM

## 2023-10-05 DIAGNOSIS — J96.01 SEPSIS WITH ACUTE HYPOXIC RESPIRATORY FAILURE WITHOUT SEPTIC SHOCK, DUE TO UNSPECIFIED ORGANISM (HCC): Primary | ICD-10-CM

## 2023-10-05 PROBLEM — J11.00 INFLUENZAL PNEUMONIA: Status: ACTIVE | Noted: 2023-10-05

## 2023-10-05 LAB
ANION GAP SERPL CALCULATED.3IONS-SCNC: 17 MMOL/L (ref 4–16)
BASOPHILS ABSOLUTE: 0.1 K/CU MM
BASOPHILS RELATIVE PERCENT: 0.5 % (ref 0–1)
BUN SERPL-MCNC: 18 MG/DL (ref 6–23)
CALCIUM SERPL-MCNC: 9.5 MG/DL (ref 8.3–10.6)
CHLORIDE BLD-SCNC: 84 MMOL/L (ref 99–110)
CO2: 32 MMOL/L (ref 21–32)
CREAT SERPL-MCNC: 1.6 MG/DL (ref 0.9–1.3)
DIFFERENTIAL TYPE: ABNORMAL
EOSINOPHILS ABSOLUTE: 0.1 K/CU MM
EOSINOPHILS RELATIVE PERCENT: 0.7 % (ref 0–3)
GFR SERPL CREATININE-BSD FRML MDRD: 45 ML/MIN/1.73M2
GLUCOSE SERPL-MCNC: 123 MG/DL (ref 70–99)
HCT VFR BLD CALC: 42.4 % (ref 42–52)
HEMOGLOBIN: 14.3 GM/DL (ref 13.5–18)
IMMATURE NEUTROPHIL %: 0.5 % (ref 0–0.43)
INFLUENZA A ANTIGEN: DETECTED
INFLUENZA B ANTIGEN: NOT DETECTED
INR BLD: 1 INDEX
LACTIC ACID, SEPSIS: 1.9 MMOL/L (ref 0.5–1.9)
LACTIC ACID, SEPSIS: 2.6 MMOL/L (ref 0.5–1.9)
LYMPHOCYTES ABSOLUTE: 2.1 K/CU MM
LYMPHOCYTES RELATIVE PERCENT: 18.3 % (ref 24–44)
MCH RBC QN AUTO: 39.8 PG (ref 27–31)
MCHC RBC AUTO-ENTMCNC: 33.7 % (ref 32–36)
MCV RBC AUTO: 118.1 FL (ref 78–100)
MONOCYTES ABSOLUTE: 0.8 K/CU MM
MONOCYTES RELATIVE PERCENT: 6.4 % (ref 0–4)
NUCLEATED RBC %: 0 %
PDW BLD-RTO: 16.6 % (ref 11.7–14.9)
PLATELET # BLD: 202 K/CU MM (ref 140–440)
PMV BLD AUTO: 9.6 FL (ref 7.5–11.1)
POTASSIUM SERPL-SCNC: 3.7 MMOL/L (ref 3.5–5.1)
PRO-BNP: 814.2 PG/ML
PROCALCITONIN SERPL-MCNC: 0.33 NG/ML
PROTHROMBIN TIME: 13 SECONDS (ref 11.7–14.5)
RBC # BLD: 3.59 M/CU MM (ref 4.6–6.2)
SARS-COV-2 RDRP RESP QL NAA+PROBE: NOT DETECTED
SEGMENTED NEUTROPHILS ABSOLUTE COUNT: 8.6 K/CU MM
SEGMENTED NEUTROPHILS RELATIVE PERCENT: 73.6 % (ref 36–66)
SODIUM BLD-SCNC: 133 MMOL/L (ref 135–145)
SOURCE: NORMAL
TOTAL IMMATURE NEUTOROPHIL: 0.06 K/CU MM
TOTAL NUCLEATED RBC: 0 K/CU MM
TROPONIN, HIGH SENSITIVITY: 72 NG/L (ref 0–22)
TROPONIN, HIGH SENSITIVITY: 80 NG/L (ref 0–22)
WBC # BLD: 11.7 K/CU MM (ref 4–10.5)

## 2023-10-05 PROCEDURE — 83880 ASSAY OF NATRIURETIC PEPTIDE: CPT

## 2023-10-05 PROCEDURE — 83605 ASSAY OF LACTIC ACID: CPT

## 2023-10-05 PROCEDURE — 87040 BLOOD CULTURE FOR BACTERIA: CPT

## 2023-10-05 PROCEDURE — 1200000000 HC SEMI PRIVATE

## 2023-10-05 PROCEDURE — 87502 INFLUENZA DNA AMP PROBE: CPT

## 2023-10-05 PROCEDURE — 71046 X-RAY EXAM CHEST 2 VIEWS: CPT

## 2023-10-05 PROCEDURE — 80048 BASIC METABOLIC PNL TOTAL CA: CPT

## 2023-10-05 PROCEDURE — 85610 PROTHROMBIN TIME: CPT

## 2023-10-05 PROCEDURE — 87635 SARS-COV-2 COVID-19 AMP PRB: CPT

## 2023-10-05 PROCEDURE — 84484 ASSAY OF TROPONIN QUANT: CPT

## 2023-10-05 PROCEDURE — 93005 ELECTROCARDIOGRAM TRACING: CPT | Performed by: STUDENT IN AN ORGANIZED HEALTH CARE EDUCATION/TRAINING PROGRAM

## 2023-10-05 PROCEDURE — 84145 PROCALCITONIN (PCT): CPT

## 2023-10-05 PROCEDURE — 2580000003 HC RX 258: Performed by: STUDENT IN AN ORGANIZED HEALTH CARE EDUCATION/TRAINING PROGRAM

## 2023-10-05 PROCEDURE — 99285 EMERGENCY DEPT VISIT HI MDM: CPT

## 2023-10-05 PROCEDURE — 6360000002 HC RX W HCPCS: Performed by: STUDENT IN AN ORGANIZED HEALTH CARE EDUCATION/TRAINING PROGRAM

## 2023-10-05 PROCEDURE — 85025 COMPLETE CBC W/AUTO DIFF WBC: CPT

## 2023-10-05 PROCEDURE — 6370000000 HC RX 637 (ALT 250 FOR IP): Performed by: STUDENT IN AN ORGANIZED HEALTH CARE EDUCATION/TRAINING PROGRAM

## 2023-10-05 RX ORDER — ASPIRIN 81 MG/1
324 TABLET, CHEWABLE ORAL ONCE
Status: COMPLETED | OUTPATIENT
Start: 2023-10-05 | End: 2023-10-05

## 2023-10-05 RX ORDER — 0.9 % SODIUM CHLORIDE 0.9 %
1000 INTRAVENOUS SOLUTION INTRAVENOUS ONCE
Status: COMPLETED | OUTPATIENT
Start: 2023-10-05 | End: 2023-10-05

## 2023-10-05 RX ORDER — OSELTAMIVIR PHOSPHATE 30 MG/1
30 CAPSULE ORAL 2 TIMES DAILY
Status: DISCONTINUED | OUTPATIENT
Start: 2023-10-06 | End: 2023-10-08 | Stop reason: HOSPADM

## 2023-10-05 RX ORDER — BUDESONIDE 0.5 MG/2ML
0.5 INHALANT ORAL 2 TIMES DAILY
Status: DISCONTINUED | OUTPATIENT
Start: 2023-10-05 | End: 2023-10-06

## 2023-10-05 RX ORDER — 0.9 % SODIUM CHLORIDE 0.9 %
1052 INTRAVENOUS SOLUTION INTRAVENOUS ONCE
Status: COMPLETED | OUTPATIENT
Start: 2023-10-05 | End: 2023-10-05

## 2023-10-05 RX ORDER — IPRATROPIUM BROMIDE AND ALBUTEROL SULFATE 2.5; .5 MG/3ML; MG/3ML
1 SOLUTION RESPIRATORY (INHALATION)
Status: DISCONTINUED | OUTPATIENT
Start: 2023-10-05 | End: 2023-10-06

## 2023-10-05 RX ORDER — 0.9 % SODIUM CHLORIDE 0.9 %
1052 INTRAVENOUS SOLUTION INTRAVENOUS ONCE
Status: DISCONTINUED | OUTPATIENT
Start: 2023-10-05 | End: 2023-10-05

## 2023-10-05 RX ORDER — OSELTAMIVIR PHOSPHATE 75 MG/1
75 CAPSULE ORAL ONCE
Status: COMPLETED | OUTPATIENT
Start: 2023-10-05 | End: 2023-10-05

## 2023-10-05 RX ADMIN — SODIUM CHLORIDE 1000 ML: 9 INJECTION, SOLUTION INTRAVENOUS at 19:33

## 2023-10-05 RX ADMIN — CEFTRIAXONE SODIUM 1000 MG: 1 INJECTION, POWDER, FOR SOLUTION INTRAMUSCULAR; INTRAVENOUS at 21:09

## 2023-10-05 RX ADMIN — SODIUM CHLORIDE 1052 ML: 9 INJECTION, SOLUTION INTRAVENOUS at 21:39

## 2023-10-05 RX ADMIN — AZITHROMYCIN 500 MG: 500 INJECTION, POWDER, LYOPHILIZED, FOR SOLUTION INTRAVENOUS at 21:00

## 2023-10-05 RX ADMIN — ASPIRIN 81 MG CHEWABLE TABLET 324 MG: 81 TABLET CHEWABLE at 21:08

## 2023-10-05 RX ADMIN — OSELTAMIVIR PHOSPHATE 75 MG: 75 CAPSULE ORAL at 21:08

## 2023-10-05 ASSESSMENT — LIFESTYLE VARIABLES
HOW MANY STANDARD DRINKS CONTAINING ALCOHOL DO YOU HAVE ON A TYPICAL DAY: PATIENT DOES NOT DRINK
HOW OFTEN DO YOU HAVE A DRINK CONTAINING ALCOHOL: NEVER

## 2023-10-05 NOTE — ED PROVIDER NOTES
2123)   albuterol sulfate HFA (PROVENTIL;VENTOLIN;PROAIR) 108 (90 Base) MCG/ACT inhaler 1 puff (Patient Supplied) (1 puff Inhalation Given 10/6/23 3329)   trolamine salicylate (ASPERCREME) 10 % cream (has no administration in time range)   sodium chloride 0.9 % bolus 1,000 mL (0 mLs IntraVENous Stopped 10/5/23 2053)   sodium chloride 0.9 % bolus 1,052 mL (0 mLs IntraVENous Stopped 10/5/23 2358)   cefTRIAXone (ROCEPHIN) 1,000 mg in sodium chloride 0.9 % 50 mL IVPB (mini-bag) (0 mg IntraVENous Stopped 10/5/23 2218)   azithromycin (ZITHROMAX) 500 mg in sodium chloride 0.9 % 250 mL IVPB (Dsat9Jcs) (0 mg IntraVENous Stopped 10/5/23 2304)   oseltamivir (TAMIFLU) capsule 75 mg (75 mg Oral Given 10/5/23 2108)   aspirin chewable tablet 324 mg (324 mg Oral Given 10/5/23 2108)       DISCHARGE PRESCRIPTIONS: (None if blank)  Current Discharge Medication List          FORMAL DIAGNOSTIC RESULTS     I have reviewed and interpreted all of the currently available lab results from this visit (if applicable):  Results for orders placed or performed during the hospital encounter of 10/05/23   Blood Culture 1    Specimen: Blood   Result Value Ref Range    Specimen BLOOD     Special Requests NONE     Culture NO GROWTH AT 24 HOURS    Blood Culture 2    Specimen: Blood   Result Value Ref Range    Specimen BLOOD     Special Requests NONE     Culture NO GROWTH AT 24 HOURS    COVID-19, Rapid    Specimen: Nasopharyngeal   Result Value Ref Range    Source UNKNOWN     SARS-CoV-2, NAAT NOT DETECTED NOT DETECTED   Influenza A/B, Molecular   Result Value Ref Range    Influenza A Antigen DETECTED (A) NOT DETECTED    Influenza B Antigen NOT DETECTED NOT DETECTED   CBC with Auto Differential   Result Value Ref Range    WBC 11.7 (H) 4.0 - 10.5 K/CU MM    RBC 3.59 (L) 4.6 - 6.2 M/CU MM    Hemoglobin 14.3 13.5 - 18.0 GM/DL    Hematocrit 42.4 42 - 52 %    .1 (H) 78 - 100 FL    MCH 39.8 (H) 27 - 31 PG    MCHC 33.7 32.0 - 36.0 %    RDW 16.6 (H) 11.7

## 2023-10-05 NOTE — ED TRIAGE NOTES
Patient to rm. 22 via EMS with c/o shortness of breath for approx. 2 days. Patient is being treated for pneumonia at this time. States he is coughing up clear mucous. Denies fever. Resps even and mildly labored.

## 2023-10-06 PROCEDURE — 94640 AIRWAY INHALATION TREATMENT: CPT

## 2023-10-06 PROCEDURE — 6370000000 HC RX 637 (ALT 250 FOR IP): Performed by: STUDENT IN AN ORGANIZED HEALTH CARE EDUCATION/TRAINING PROGRAM

## 2023-10-06 PROCEDURE — 94761 N-INVAS EAR/PLS OXIMETRY MLT: CPT

## 2023-10-06 PROCEDURE — 6360000002 HC RX W HCPCS: Performed by: STUDENT IN AN ORGANIZED HEALTH CARE EDUCATION/TRAINING PROGRAM

## 2023-10-06 PROCEDURE — 93005 ELECTROCARDIOGRAM TRACING: CPT | Performed by: FAMILY MEDICINE

## 2023-10-06 PROCEDURE — 2700000000 HC OXYGEN THERAPY PER DAY

## 2023-10-06 PROCEDURE — 1200000000 HC SEMI PRIVATE

## 2023-10-06 PROCEDURE — 2580000003 HC RX 258: Performed by: STUDENT IN AN ORGANIZED HEALTH CARE EDUCATION/TRAINING PROGRAM

## 2023-10-06 RX ORDER — METOLAZONE 5 MG/1
2.5 TABLET ORAL DAILY
Status: DISCONTINUED | OUTPATIENT
Start: 2023-10-06 | End: 2023-10-08 | Stop reason: HOSPADM

## 2023-10-06 RX ORDER — PANTOPRAZOLE SODIUM 40 MG/1
40 TABLET, DELAYED RELEASE ORAL
Status: DISCONTINUED | OUTPATIENT
Start: 2023-10-06 | End: 2023-10-08 | Stop reason: HOSPADM

## 2023-10-06 RX ORDER — POLYETHYLENE GLYCOL 3350 17 G/17G
17 POWDER, FOR SOLUTION ORAL DAILY PRN
Status: DISCONTINUED | OUTPATIENT
Start: 2023-10-06 | End: 2023-10-08 | Stop reason: HOSPADM

## 2023-10-06 RX ORDER — ONDANSETRON 4 MG/1
4 TABLET, ORALLY DISINTEGRATING ORAL EVERY 8 HOURS PRN
Status: DISCONTINUED | OUTPATIENT
Start: 2023-10-06 | End: 2023-10-08 | Stop reason: HOSPADM

## 2023-10-06 RX ORDER — SODIUM CHLORIDE 0.9 % (FLUSH) 0.9 %
5-40 SYRINGE (ML) INJECTION EVERY 12 HOURS SCHEDULED
Status: DISCONTINUED | OUTPATIENT
Start: 2023-10-06 | End: 2023-10-08 | Stop reason: HOSPADM

## 2023-10-06 RX ORDER — POTASSIUM CHLORIDE 7.45 MG/ML
10 INJECTION INTRAVENOUS PRN
Status: DISCONTINUED | OUTPATIENT
Start: 2023-10-06 | End: 2023-10-08 | Stop reason: HOSPADM

## 2023-10-06 RX ORDER — ROSUVASTATIN CALCIUM 5 MG/1
10 TABLET, COATED ORAL DAILY
Status: DISCONTINUED | OUTPATIENT
Start: 2023-10-06 | End: 2023-10-08 | Stop reason: HOSPADM

## 2023-10-06 RX ORDER — SODIUM CHLORIDE 9 MG/ML
INJECTION, SOLUTION INTRAVENOUS PRN
Status: DISCONTINUED | OUTPATIENT
Start: 2023-10-06 | End: 2023-10-08 | Stop reason: HOSPADM

## 2023-10-06 RX ORDER — LACTULOSE 10 G/15ML
10 SOLUTION ORAL 3 TIMES DAILY
Status: DISCONTINUED | OUTPATIENT
Start: 2023-10-06 | End: 2023-10-08 | Stop reason: HOSPADM

## 2023-10-06 RX ORDER — SODIUM CHLORIDE 0.9 % (FLUSH) 0.9 %
5-40 SYRINGE (ML) INJECTION PRN
Status: DISCONTINUED | OUTPATIENT
Start: 2023-10-06 | End: 2023-10-08 | Stop reason: HOSPADM

## 2023-10-06 RX ORDER — MONTELUKAST SODIUM 10 MG/1
10 TABLET ORAL DAILY
Status: DISCONTINUED | OUTPATIENT
Start: 2023-10-06 | End: 2023-10-08 | Stop reason: HOSPADM

## 2023-10-06 RX ORDER — SPIRONOLACTONE 50 MG/1
25 TABLET, FILM COATED ORAL DAILY
Status: DISCONTINUED | OUTPATIENT
Start: 2023-10-06 | End: 2023-10-08 | Stop reason: HOSPADM

## 2023-10-06 RX ORDER — ACETAMINOPHEN 650 MG/1
650 SUPPOSITORY RECTAL EVERY 6 HOURS PRN
Status: DISCONTINUED | OUTPATIENT
Start: 2023-10-06 | End: 2023-10-08 | Stop reason: HOSPADM

## 2023-10-06 RX ORDER — ONDANSETRON 2 MG/ML
4 INJECTION INTRAMUSCULAR; INTRAVENOUS EVERY 6 HOURS PRN
Status: DISCONTINUED | OUTPATIENT
Start: 2023-10-06 | End: 2023-10-08 | Stop reason: HOSPADM

## 2023-10-06 RX ORDER — ASPIRIN 81 MG/1
81 TABLET, CHEWABLE ORAL DAILY
Status: DISCONTINUED | OUTPATIENT
Start: 2023-10-06 | End: 2023-10-08 | Stop reason: HOSPADM

## 2023-10-06 RX ORDER — ALBUTEROL SULFATE 90 UG/1
1 AEROSOL, METERED RESPIRATORY (INHALATION) EVERY 4 HOURS PRN
Status: DISCONTINUED | OUTPATIENT
Start: 2023-10-06 | End: 2023-10-08 | Stop reason: HOSPADM

## 2023-10-06 RX ORDER — ACETAMINOPHEN 325 MG/1
650 TABLET ORAL EVERY 6 HOURS PRN
Status: DISCONTINUED | OUTPATIENT
Start: 2023-10-06 | End: 2023-10-08 | Stop reason: HOSPADM

## 2023-10-06 RX ORDER — TAMSULOSIN HYDROCHLORIDE 0.4 MG/1
0.4 CAPSULE ORAL DAILY
Status: DISCONTINUED | OUTPATIENT
Start: 2023-10-06 | End: 2023-10-08 | Stop reason: HOSPADM

## 2023-10-06 RX ORDER — ENOXAPARIN SODIUM 100 MG/ML
30 INJECTION SUBCUTANEOUS 2 TIMES DAILY
Status: DISCONTINUED | OUTPATIENT
Start: 2023-10-06 | End: 2023-10-08 | Stop reason: HOSPADM

## 2023-10-06 RX ORDER — TROLAMINE SALICYLATE 10 G/100G
CREAM TOPICAL 2 TIMES DAILY PRN
Status: DISCONTINUED | OUTPATIENT
Start: 2023-10-06 | End: 2023-10-08 | Stop reason: HOSPADM

## 2023-10-06 RX ORDER — MAGNESIUM SULFATE IN WATER 40 MG/ML
2000 INJECTION, SOLUTION INTRAVENOUS PRN
Status: DISCONTINUED | OUTPATIENT
Start: 2023-10-06 | End: 2023-10-08 | Stop reason: HOSPADM

## 2023-10-06 RX ORDER — FUROSEMIDE 40 MG/1
40 TABLET ORAL 2 TIMES DAILY
Status: DISCONTINUED | OUTPATIENT
Start: 2023-10-06 | End: 2023-10-08 | Stop reason: HOSPADM

## 2023-10-06 RX ADMIN — MONTELUKAST 10 MG: 10 TABLET, FILM COATED ORAL at 08:40

## 2023-10-06 RX ADMIN — OSELTAMIVIR PHOSPHATE 30 MG: 30 CAPSULE ORAL at 08:51

## 2023-10-06 RX ADMIN — METOLAZONE 2.5 MG: 5 TABLET ORAL at 08:41

## 2023-10-06 RX ADMIN — ASPIRIN 81 MG CHEWABLE TABLET 81 MG: 81 TABLET CHEWABLE at 08:42

## 2023-10-06 RX ADMIN — TAMSULOSIN HYDROCHLORIDE 0.4 MG: 0.4 CAPSULE ORAL at 08:43

## 2023-10-06 RX ADMIN — FUROSEMIDE 40 MG: 40 TABLET ORAL at 20:30

## 2023-10-06 RX ADMIN — ROSUVASTATIN CALCIUM 10 MG: 5 TABLET, FILM COATED ORAL at 08:43

## 2023-10-06 RX ADMIN — IPRATROPIUM BROMIDE AND ALBUTEROL SULFATE 1 DOSE: 2.5; .5 SOLUTION RESPIRATORY (INHALATION) at 07:28

## 2023-10-06 RX ADMIN — ACETAMINOPHEN 650 MG: 325 TABLET ORAL at 20:31

## 2023-10-06 RX ADMIN — ALBUTEROL SULFATE 1 PUFF: 90 AEROSOL, METERED RESPIRATORY (INHALATION) at 21:25

## 2023-10-06 RX ADMIN — SERTRALINE HYDROCHLORIDE 150 MG: 50 TABLET ORAL at 08:43

## 2023-10-06 RX ADMIN — ACETAMINOPHEN 650 MG: 325 TABLET ORAL at 12:35

## 2023-10-06 RX ADMIN — BUDESONIDE 500 MCG: 0.5 SUSPENSION RESPIRATORY (INHALATION) at 07:40

## 2023-10-06 RX ADMIN — FUROSEMIDE 40 MG: 40 TABLET ORAL at 08:42

## 2023-10-06 RX ADMIN — SPIRONOLACTONE 25 MG: 50 TABLET ORAL at 08:42

## 2023-10-06 RX ADMIN — SODIUM CHLORIDE, PRESERVATIVE FREE 10 ML: 5 INJECTION INTRAVENOUS at 08:44

## 2023-10-06 RX ADMIN — OSELTAMIVIR PHOSPHATE 30 MG: 30 CAPSULE ORAL at 20:34

## 2023-10-06 RX ADMIN — ALBUTEROL SULFATE 1 PUFF: 90 AEROSOL, METERED RESPIRATORY (INHALATION) at 15:40

## 2023-10-06 ASSESSMENT — ENCOUNTER SYMPTOMS
EYE DISCHARGE: 0
DIARRHEA: 0
SORE THROAT: 0
WHEEZING: 0
SHORTNESS OF BREATH: 1
CONSTIPATION: 0
COUGH: 0
BACK PAIN: 0
NAUSEA: 0
ABDOMINAL DISTENTION: 0

## 2023-10-06 ASSESSMENT — PAIN SCALES - GENERAL
PAINLEVEL_OUTOF10: 4
PAINLEVEL_OUTOF10: 7
PAINLEVEL_OUTOF10: 4

## 2023-10-06 ASSESSMENT — PAIN DESCRIPTION - DESCRIPTORS: DESCRIPTORS: ACHING

## 2023-10-06 ASSESSMENT — PAIN DESCRIPTION - ORIENTATION: ORIENTATION: RIGHT;LEFT

## 2023-10-06 ASSESSMENT — PAIN DESCRIPTION - LOCATION
LOCATION: ARM
LOCATION: HEAD

## 2023-10-06 NOTE — CARE COORDINATION
LSW spoke to pt. Pt alert and oriented. Pt lives in [place] with wife. No STEPH. Pt is independent in ADLs and is primary caretaker of wife. Pt has a PCP who he has seen in the last month. Pt has no DME needs. Pt has insurance to help with healthcare cost. Discharge plan is home. Pt denies needs. 10/06/23 5727   Service Assessment   Patient Orientation Alert and Oriented   Cognition Alert   Primary Caregiver Self   Prior Functional Level Independent in ADLs/IADLs   Social/Functional History   Lives With Spouse   Type of Home Condo   Home Layout One level   Condition of Participation: Discharge Planning   The Patient and/or Patient Representative was provided with a Choice of Provider? Patient   The Patient and/Or Patient Representative agree with the Discharge Plan? Yes   Freedom of Choice list was provided with basic dialogue that supports the patient's individualized plan of care/goals, treatment preferences, and shares the quality data associated with the providers?   Yes

## 2023-10-06 NOTE — H&P
History and Physical      Name:  Irineo Casey. /Age/Sex: 1950  (68 y.o. male)   MRN & CSN:  [id number] & [id number] Encounter Date/Time: 10/5/2023 8:31 PM EDT   Location:  [address] PCP: eVlvet Caldwell, [address] [place] [address] Day: 1    Assessment and Plan:     Patient is a 80-year-old male who presented with SOB. # Acute hypoxemic respiratory failure likely secondary to influenza A pneumonia  - Endorsed worsening SOB over the past few days with productive cough of white sputum. No fevers. Not on oxygen at baseline. - SpO2 86% on RA, requiring 2L NC in ED. Afebrile, no leukocytosis, PCT normal. CXR with possible bibasilar infiltrates. - Started on Tamiflu and Rocephin/Azithro in ED, continue with Tamiflu x5 days. Isolation. # Mild intermittent asthma  - No evidence of exacerbation. Continue home inhalers. # Recent traumatic intracranial hemorrhage in 2023  - Had fall while on Coumadin leading to multiple areas of intracranial traumatic bleed (bifrontal subdural hematoma and left frontal subarachnoid hemorrhage), admitted at [facility].  - Currently not on Coumadin (was for stroke in ) or Keppra. # Non-MI troponin elevation  - Denied any typical CP.  - Initial Tn mildly elevated, repeat flat. ECG without acute ischemic changes. - Likely secondary to respiratory failure. Continue ASA and Crestor. # Essential hypretension  - Continue home Aldactone. # CKD3a  - Labs overall stable, avoid nephrotoxic medications. # T2DM with hyperglycemia  - Last A1c 5.8% in 2022, repeat pending.   - Held Glipizide  - LCSI. # GERD  - Continue home PPI. # Depression  - Continue Zoloft. Checklist:  Advanced directive: full  Diet: cardiac  DVT ppx: Lovenox    Disposition: admit to inpatient. Estimated discharge: 2-3 day(s). Current living situation: home. Expected disposition: home.     Spoke with ED provider who recommended admission for the patient and I agree with that atelectasis. Aspiration and pneumonia are not   excluded.              Ginger Somers MD  10/05/23 8:31 PM

## 2023-10-06 NOTE — PROGRESS NOTES
V2.0  [facility] Hospitalist Progress Note      Name:  Flavia Gil /Age/Sex: 1950  (68 y.o. male)   MRN & CSN:  [id number] & [id number] Encounter Date/Time: 10/6/2023 8:27 AM EDT    Location:  7841/8163-W PCP: Jennifer Kirk MD       Hospital Day: 2    Assessment and Plan:   Flavia Gil is a 68 y.o. male with pmh of mild intermittent asthma, HTN, CKD3a, T2DM, GERD, depression and recent traumatic intracranial hemorrhage in 2023  who presents with Influenzal pneumonia      Plan:  # Acute hypoxemic respiratory failure likely secondary to influenza A pneumonia  Sepsis  - Endorsed worsening SOB over the past few days with productive cough of white sputum. No fevers. Not on oxygen at baseline. - SpO2 86% on RA, requiring 2L NC in ED. Afebrile, no leukocytosis, PCT normal. CXR with possible bibasilar infiltrates. - LA elevated on admission 2.6, now WNL  - Started on Tamiflu and Rocephin/Azithro in ED, continue with Tamiflu x5 days. Isolation. Tachycardia  -Noted this AM, asymptomatic  -EKG notable for sinus tachycardia with PACs  -Continue to monitor clinically, if persists then will consider cardiology consult     # Mild intermittent asthma  - No evidence of exacerbation. Continue home inhalers. # Recent traumatic intracranial hemorrhage in 2023  # history of multiple chronic areas of strokes (left frontal stroke; right basal ganglia stroke; right thalamic stroke; right cerebellum stroke)  - Had fall while on Coumadin leading to multiple areas of intracranial traumatic bleed (bifrontal subdural hematoma and left frontal subarachnoid hemorrhage), admitted at [facility].  - Currently not on Coumadin (was for stroke in ) or Keppra. # Non-MI troponin elevation  - Denied any typical CP.  - Initial Tn mildly elevated, repeat flat. ECG without acute ischemic changes. - Likely secondary to respiratory failure. Continue ASA and Crestor.      # Essential hypretension  - Continue home

## 2023-10-06 NOTE — PROGRESS NOTES
4 Eyes Skin Assessment     NAME:  Valentine Santos Sr. YOB: 1950  MEDICAL RECORD NUMBER:  [id number]    The patient is being assessed for  Admission    I agree that at least one RN has performed a thorough Head to Toe Skin Assessment on the patient. ALL assessment sites listed below have been assessed. Areas assessed by both nurses:    Head, Face, Ears, Shoulders, Back, Chest, Arms, Elbows, Hands, Sacrum. Buttock, Coccyx, Ischium, Legs. Feet and Heels, and Under Medical Devices         Does the Patient have a Wound?  No noted wound(s)       Aly Prevention initiated by RN: Yes  Wound Care Orders initiated by RN: No    Pressure Injury (Stage 3,4, Unstageable, DTI, NWPT, and Complex wounds) if present, place Wound referral order by RN under : No    New Ostomies, if present place, Ostomy referral order under : No     Nurse 1 eSignature: Electronically signed by Guillermina Bhagat RN on 10/6/23 at 5:28 AM EDT    **SHARE this note so that the co-signing nurse can place an eSignature**    Nurse 2 eSignature: {Esignature:[id number]}

## 2023-10-06 NOTE — ED NOTES
ED TO INPATIENT SBAR HANDOFF    Patient Name: Chloe Burden   :  1950  68 y.o. Preferred Name  Sugey Krishna  Family/Caregiver Present no   Restraints no   C-SSRS: Risk of Suicide: No Risk  Sitter no   Sepsis Risk Score Sepsis Risk Score: 5.18      Situation  Chief Complaint   Patient presents with    Shortness of Breath     States was diagnosed with PNA     Brief Description of Patient's Condition: Patient is A & O x 4. Patient is coughing fiercely. Tested positive for influenza A. Able to use the urinal at bedside without assistance. Mental Status: oriented, alert, coherent, logical, thought processes intact, and able to concentrate and follow conversation  Arrived from: home    Imaging:   XR CHEST (2 VW)   Final Result   Small right pleural effusion and bibasilar airspace opacities right greater   than left compatible with atelectasis. Aspiration and pneumonia are not   excluded.            Abnormal labs:   Abnormal Labs Reviewed   INFLUENZA A/B, MOLECULAR - Abnormal; Notable for the following components:       Result Value    Influenza A Antigen DETECTED (*)     All other components within normal limits   CBC WITH AUTO DIFFERENTIAL - Abnormal; Notable for the following components:    WBC 11.7 (*)     RBC 3.59 (*)     .1 (*)     MCH 39.8 (*)     RDW 16.6 (*)     Segs Relative 73.6 (*)     Lymphocytes % 18.3 (*)     Monocytes % 6.4 (*)     Immature Neutrophil % 0.5 (*)     All other components within normal limits   BASIC METABOLIC PANEL - Abnormal; Notable for the following components:    Sodium 133 (*)     Chloride 84 (*)     Anion Gap 17 (*)     Glucose 123 (*)     Creatinine 1.6 (*)     Est, Glom Filt Rate 45 (*)     All other components within normal limits   LACTATE, SEPSIS - Abnormal; Notable for the following components:    Lactic Acid, Sepsis 2.6 (*)     All other components within normal limits   TROPONIN - Abnormal; Notable for the following components:    Troponin, High Sensitivity 80 (*)

## 2023-10-07 PROBLEM — I48.0 PAROXYSMAL ATRIAL FIBRILLATION (HCC): Status: ACTIVE | Noted: 2023-10-07

## 2023-10-07 LAB
ALBUMIN SERPL-MCNC: 3.3 GM/DL (ref 3.4–5)
ALP BLD-CCNC: 150 IU/L (ref 40–128)
ALT SERPL-CCNC: 15 U/L (ref 10–40)
ANION GAP SERPL CALCULATED.3IONS-SCNC: 12 MMOL/L (ref 4–16)
ANION GAP SERPL CALCULATED.3IONS-SCNC: 13 MMOL/L (ref 4–16)
ANION GAP SERPL CALCULATED.3IONS-SCNC: 13 MMOL/L (ref 4–16)
AST SERPL-CCNC: 28 IU/L (ref 15–37)
BASOPHILS ABSOLUTE: 0 K/CU MM
BASOPHILS RELATIVE PERCENT: 0.2 % (ref 0–1)
BILIRUB SERPL-MCNC: 1.3 MG/DL (ref 0–1)
BUN SERPL-MCNC: 23 MG/DL (ref 6–23)
BUN SERPL-MCNC: 24 MG/DL (ref 6–23)
BUN SERPL-MCNC: 26 MG/DL (ref 6–23)
CALCIUM SERPL-MCNC: 8.7 MG/DL (ref 8.3–10.6)
CALCIUM SERPL-MCNC: 8.7 MG/DL (ref 8.3–10.6)
CALCIUM SERPL-MCNC: 8.8 MG/DL (ref 8.3–10.6)
CHLORIDE BLD-SCNC: 81 MMOL/L (ref 99–110)
CHLORIDE BLD-SCNC: 82 MMOL/L (ref 99–110)
CHLORIDE BLD-SCNC: 82 MMOL/L (ref 99–110)
CO2: 36 MMOL/L (ref 21–32)
CO2: 37 MMOL/L (ref 21–32)
CO2: 39 MMOL/L (ref 21–32)
CREAT SERPL-MCNC: 1.6 MG/DL (ref 0.9–1.3)
CREAT SERPL-MCNC: 1.7 MG/DL (ref 0.9–1.3)
CREAT SERPL-MCNC: 1.8 MG/DL (ref 0.9–1.3)
DIFFERENTIAL TYPE: ABNORMAL
EKG ATRIAL RATE: 118 BPM
EKG ATRIAL RATE: 97 BPM
EKG DIAGNOSIS: NORMAL
EKG DIAGNOSIS: NORMAL
EKG P AXIS: -19 DEGREES
EKG P AXIS: 63 DEGREES
EKG P-R INTERVAL: 150 MS
EKG P-R INTERVAL: 164 MS
EKG Q-T INTERVAL: 382 MS
EKG Q-T INTERVAL: 404 MS
EKG QRS DURATION: 122 MS
EKG QRS DURATION: 132 MS
EKG QTC CALCULATION (BAZETT): 513 MS
EKG QTC CALCULATION (BAZETT): 535 MS
EKG R AXIS: 41 DEGREES
EKG R AXIS: 77 DEGREES
EKG T AXIS: -3 DEGREES
EKG T AXIS: -6 DEGREES
EKG VENTRICULAR RATE: 118 BPM
EKG VENTRICULAR RATE: 97 BPM
EOSINOPHILS ABSOLUTE: 0.1 K/CU MM
EOSINOPHILS RELATIVE PERCENT: 0.9 % (ref 0–3)
GFR SERPL CREATININE-BSD FRML MDRD: 39 ML/MIN/1.73M2
GFR SERPL CREATININE-BSD FRML MDRD: 42 ML/MIN/1.73M2
GFR SERPL CREATININE-BSD FRML MDRD: 45 ML/MIN/1.73M2
GLUCOSE SERPL-MCNC: 146 MG/DL (ref 70–99)
GLUCOSE SERPL-MCNC: 158 MG/DL (ref 70–99)
GLUCOSE SERPL-MCNC: 165 MG/DL (ref 70–99)
HCT VFR BLD CALC: 38.3 % (ref 42–52)
HEMOGLOBIN: 12.8 GM/DL (ref 13.5–18)
IMMATURE NEUTROPHIL %: 0.6 % (ref 0–0.43)
LYMPHOCYTES ABSOLUTE: 2 K/CU MM
LYMPHOCYTES RELATIVE PERCENT: 16.2 % (ref 24–44)
MAGNESIUM: 1.4 MG/DL (ref 1.8–2.4)
MAGNESIUM: 1.7 MG/DL (ref 1.8–2.4)
MAGNESIUM: 1.9 MG/DL (ref 1.8–2.4)
MCH RBC QN AUTO: 40 PG (ref 27–31)
MCHC RBC AUTO-ENTMCNC: 33.4 % (ref 32–36)
MCV RBC AUTO: 119.7 FL (ref 78–100)
MONOCYTES ABSOLUTE: 0.8 K/CU MM
MONOCYTES RELATIVE PERCENT: 6.5 % (ref 0–4)
NUCLEATED RBC %: 0.2 %
PDW BLD-RTO: 16 % (ref 11.7–14.9)
PLATELET # BLD: 151 K/CU MM (ref 140–440)
PMV BLD AUTO: 9.8 FL (ref 7.5–11.1)
POTASSIUM SERPL-SCNC: 3 MMOL/L (ref 3.5–5.1)
POTASSIUM SERPL-SCNC: 3.5 MMOL/L (ref 3.5–5.1)
POTASSIUM SERPL-SCNC: 4 MMOL/L (ref 3.5–5.1)
RBC # BLD: 3.2 M/CU MM (ref 4.6–6.2)
REASON FOR REJECTION: NORMAL
REASON FOR REJECTION: NORMAL
REJECTED TEST: NORMAL
REJECTED TEST: NORMAL
SEGMENTED NEUTROPHILS ABSOLUTE COUNT: 9.3 K/CU MM
SEGMENTED NEUTROPHILS RELATIVE PERCENT: 75.6 % (ref 36–66)
SODIUM BLD-SCNC: 130 MMOL/L (ref 135–145)
SODIUM BLD-SCNC: 132 MMOL/L (ref 135–145)
SODIUM BLD-SCNC: 133 MMOL/L (ref 135–145)
TOTAL IMMATURE NEUTOROPHIL: 0.07 K/CU MM
TOTAL NUCLEATED RBC: 0 K/CU MM
TOTAL PROTEIN: 6.7 GM/DL (ref 6.4–8.2)
TROPONIN, HIGH SENSITIVITY: 69 NG/L (ref 0–22)
WBC # BLD: 12.3 K/CU MM (ref 4–10.5)

## 2023-10-07 PROCEDURE — 1200000000 HC SEMI PRIVATE

## 2023-10-07 PROCEDURE — 94618 PULMONARY STRESS TESTING: CPT

## 2023-10-07 PROCEDURE — 80048 BASIC METABOLIC PNL TOTAL CA: CPT

## 2023-10-07 PROCEDURE — 6360000002 HC RX W HCPCS: Performed by: STUDENT IN AN ORGANIZED HEALTH CARE EDUCATION/TRAINING PROGRAM

## 2023-10-07 PROCEDURE — 94640 AIRWAY INHALATION TREATMENT: CPT

## 2023-10-07 PROCEDURE — 2580000003 HC RX 258: Performed by: STUDENT IN AN ORGANIZED HEALTH CARE EDUCATION/TRAINING PROGRAM

## 2023-10-07 PROCEDURE — 6370000000 HC RX 637 (ALT 250 FOR IP): Performed by: INTERNAL MEDICINE

## 2023-10-07 PROCEDURE — 99223 1ST HOSP IP/OBS HIGH 75: CPT | Performed by: INTERNAL MEDICINE

## 2023-10-07 PROCEDURE — 6370000000 HC RX 637 (ALT 250 FOR IP): Performed by: STUDENT IN AN ORGANIZED HEALTH CARE EDUCATION/TRAINING PROGRAM

## 2023-10-07 PROCEDURE — 85025 COMPLETE CBC W/AUTO DIFF WBC: CPT

## 2023-10-07 PROCEDURE — 94761 N-INVAS EAR/PLS OXIMETRY MLT: CPT

## 2023-10-07 PROCEDURE — 84484 ASSAY OF TROPONIN QUANT: CPT

## 2023-10-07 PROCEDURE — 94664 DEMO&/EVAL PT USE INHALER: CPT

## 2023-10-07 PROCEDURE — 6370000000 HC RX 637 (ALT 250 FOR IP): Performed by: FAMILY MEDICINE

## 2023-10-07 PROCEDURE — 2700000000 HC OXYGEN THERAPY PER DAY

## 2023-10-07 PROCEDURE — 80053 COMPREHEN METABOLIC PANEL: CPT

## 2023-10-07 PROCEDURE — 83735 ASSAY OF MAGNESIUM: CPT

## 2023-10-07 PROCEDURE — 93010 ELECTROCARDIOGRAM REPORT: CPT | Performed by: INTERNAL MEDICINE

## 2023-10-07 PROCEDURE — 36415 COLL VENOUS BLD VENIPUNCTURE: CPT

## 2023-10-07 RX ORDER — POTASSIUM CHLORIDE 20 MEQ/1
40 TABLET, EXTENDED RELEASE ORAL ONCE
Status: COMPLETED | OUTPATIENT
Start: 2023-10-07 | End: 2023-10-07

## 2023-10-07 RX ORDER — MAGNESIUM SULFATE IN WATER 40 MG/ML
2000 INJECTION, SOLUTION INTRAVENOUS ONCE
Status: DISCONTINUED | OUTPATIENT
Start: 2023-10-07 | End: 2023-10-08 | Stop reason: HOSPADM

## 2023-10-07 RX ADMIN — FUROSEMIDE 40 MG: 40 TABLET ORAL at 20:38

## 2023-10-07 RX ADMIN — POTASSIUM CHLORIDE 40 MEQ: 20 TABLET, EXTENDED RELEASE ORAL at 05:29

## 2023-10-07 RX ADMIN — SPIRONOLACTONE 25 MG: 50 TABLET ORAL at 10:19

## 2023-10-07 RX ADMIN — METOLAZONE 2.5 MG: 5 TABLET ORAL at 10:19

## 2023-10-07 RX ADMIN — ASPIRIN 81 MG CHEWABLE TABLET 81 MG: 81 TABLET CHEWABLE at 10:19

## 2023-10-07 RX ADMIN — DILTIAZEM HYDROCHLORIDE 30 MG: 30 TABLET, FILM COATED ORAL at 18:32

## 2023-10-07 RX ADMIN — ROSUVASTATIN CALCIUM 10 MG: 5 TABLET, FILM COATED ORAL at 10:19

## 2023-10-07 RX ADMIN — OSELTAMIVIR PHOSPHATE 30 MG: 30 CAPSULE ORAL at 20:37

## 2023-10-07 RX ADMIN — ALBUTEROL SULFATE 1 PUFF: 90 AEROSOL, METERED RESPIRATORY (INHALATION) at 17:30

## 2023-10-07 RX ADMIN — POTASSIUM CHLORIDE 10 MEQ: 7.46 INJECTION, SOLUTION INTRAVENOUS at 04:56

## 2023-10-07 RX ADMIN — SODIUM CHLORIDE, PRESERVATIVE FREE 10 ML: 5 INJECTION INTRAVENOUS at 10:20

## 2023-10-07 RX ADMIN — MONTELUKAST 10 MG: 10 TABLET, FILM COATED ORAL at 10:19

## 2023-10-07 RX ADMIN — MAGNESIUM SULFATE HEPTAHYDRATE 2000 MG: 40 INJECTION, SOLUTION INTRAVENOUS at 11:51

## 2023-10-07 RX ADMIN — ALBUTEROL SULFATE 1 PUFF: 90 AEROSOL, METERED RESPIRATORY (INHALATION) at 10:37

## 2023-10-07 RX ADMIN — ACETAMINOPHEN 650 MG: 325 TABLET ORAL at 20:37

## 2023-10-07 RX ADMIN — OSELTAMIVIR PHOSPHATE 30 MG: 30 CAPSULE ORAL at 10:24

## 2023-10-07 RX ADMIN — FUROSEMIDE 40 MG: 40 TABLET ORAL at 10:19

## 2023-10-07 RX ADMIN — TAMSULOSIN HYDROCHLORIDE 0.4 MG: 0.4 CAPSULE ORAL at 10:19

## 2023-10-07 RX ADMIN — PANTOPRAZOLE SODIUM 40 MG: 40 TABLET, DELAYED RELEASE ORAL at 05:30

## 2023-10-07 RX ADMIN — SERTRALINE HYDROCHLORIDE 150 MG: 50 TABLET ORAL at 10:19

## 2023-10-07 ASSESSMENT — ENCOUNTER SYMPTOMS
BACK PAIN: 0
SORE THROAT: 0
COUGH: 0
NAUSEA: 0
ABDOMINAL DISTENTION: 0
WHEEZING: 0
SHORTNESS OF BREATH: 1
CONSTIPATION: 0
EYE DISCHARGE: 0
DIARRHEA: 0

## 2023-10-07 ASSESSMENT — PAIN DESCRIPTION - LOCATION: LOCATION: HAND

## 2023-10-07 ASSESSMENT — PAIN SCALES - GENERAL
PAINLEVEL_OUTOF10: 0
PAINLEVEL_OUTOF10: 4

## 2023-10-07 ASSESSMENT — PAIN DESCRIPTION - DESCRIPTORS: DESCRIPTORS: ACHING

## 2023-10-07 ASSESSMENT — PAIN SCALES - WONG BAKER: WONGBAKER_NUMERICALRESPONSE: 0

## 2023-10-07 ASSESSMENT — PAIN - FUNCTIONAL ASSESSMENT: PAIN_FUNCTIONAL_ASSESSMENT: ACTIVITIES ARE NOT PREVENTED

## 2023-10-07 ASSESSMENT — PAIN DESCRIPTION - ORIENTATION: ORIENTATION: RIGHT;LEFT

## 2023-10-07 NOTE — PROGRESS NOTES
to respiratory failure. Continue ASA and Crestor. # Essential hypretension  - Continue home Aldactone. # CKD3a  - Labs overall stable, avoid nephrotoxic medications. # T2DM with hyperglycemia  - Last A1c 5.8% in 5/2022, repeat pending.   - Held Glipizide  - LCSI. # GERD  - Continue home PPI. # Depression  - Continue Zoloft. Diet ADULT DIET; Regular; 5 carb choices (75 gm/meal); Low Fat/Low Chol/High Fiber/GRUPO; Low Sodium (2 gm)   DVT Prophylaxis [] Lovenox, []  Heparin, [] SCDs, [] Ambulation,  [] Eliquis, [] Xarelto  [] Coumadin   Code Status Full Code   Disposition From: Home  Expected Disposition: Home  Estimated Date of Discharge: 1-2 days  Patient requires continued admission due to influenza A PNA   Surrogate Decision Maker/ POA      Subjective:     Chief Complaint: Shortness of Breath (States was diagnosed with PNA)     Patient seen and evaluated at bedside. No acute overnight events. Patient seen comfortably sitting in chair. shortness of breath improved significantly from admission. Denies any active complaints. Patient counseled to ambulate. Tolerating diet. Discussed with CM regarding patient care. EKG interpreted personally and results are sinus tachycardia with PAC     Imaging that was interpreted personally includes XR chest     Drugs that require monitoring for toxicity include lovenox  and monitor with CBC, BMP    Discussed management of the case in detail w/ the patient    Comment: Please note this report has been produced using speech recognition software and may contain errors related to that system including errors in grammar, punctuation, and spelling, as well as words and phrases that may be inappropriate. If there are any questions or concerns please feel free to contact the dictating provider for clarification. Review of Systems:    Review of Systems   Constitutional:  Negative for activity change, appetite change, fatigue and fever.    HENT: 128 IU/L    ALT 15 10 - 40 U/L    AST 28 15 - 37 IU/L   Magnesium    Collection Time: 10/07/23  6:21 AM   Result Value Ref Range    Magnesium 1.4 (L) 1.8 - 2.4 mg/dl        Imaging/Diagnostics Last 24 Hours   XR CHEST (2 VW)    Result Date: 10/5/2023  EXAMINATION: TWO XRAY VIEWS OF THE CHEST 10/5/2023 6:56 pm COMPARISON: Chest radiograph 09/13/2023. HISTORY: ORDERING SYSTEM PROVIDED HISTORY: shortness of breath TECHNOLOGIST PROVIDED HISTORY: Reason for exam:->shortness of breath Reason for Exam: SOB FINDINGS: The cardiac silhouette is enlarged but stable. Shallow inspiration. Small right pleural effusion. Bibasilar airspace opacities right greater than left. No pneumothorax or vascular congestion. No acute osseous abnormality. Small right pleural effusion and bibasilar airspace opacities right greater than left compatible with atelectasis. Aspiration and pneumonia are not excluded.        Electronically signed by Buster Livingston MD on 10/7/2023 at 8:23 AM

## 2023-10-07 NOTE — PROGRESS NOTES
After applying topical pain medication patient still complains on pain on his both hands. Applied some warm blankets on the site. Tele informed that patient rhythm become afib, informed NP Monster Baez ordered. Ekg done, result was sinus rhythm with PAC's.

## 2023-10-07 NOTE — CONSULTS
CARDIOLOGY CONSULT NOTE   Reason for consultation:  troponin elevation and abnormal EKG    Referring physician:  Larry Quach MD     Primary care physician: Mini Shin MD      Dear  Dr. Larry Quach MD   Thanks for the consult. Chief Complaints :  Chief Complaint   Patient presents with    Shortness of Breath     States was diagnosed with PNA        History of present illness:Balta is a 68 y. o.year old who presents with worsening shortness of breath dyspnea on exertion hypoxia due to parainfluenza pneumonia cardiology asked to evaluate him due to episodes of tachycardia as well as abnormal troponin levels he is denying any chest pain but reports shortness of breath he is drinking coffee he was actually admitted in August 2023 after multiple brain hemorrhages which were thought to be related to fall he had been on anticoagulation on Coumadin until August 2023 due to multiple strokes   history of multiple chronic areas of strokes (left frontal stroke; right basal ganglia stroke; right thalamic stroke; right cerebellum stroke), . He was admitted on 8/8/23 after a fall with head CT revealing showed multiple areas of intracranial traumatic bleed (bifrontal subdural hematomas and left frontal subarachnoid hemorrhage). Denies any chest pain    Past medical history:    has a past medical history of Diabetes mellitus ([address]), Hypertension, Kidney disease, Pleurisy with effusion, and Unspecified cerebral artery occlusion with cerebral infarction. Past surgical history:   has a past surgical history that includes Achilles tendon surgery (Right); Quadraceps tendon repair (Right); and Upper gastrointestinal endoscopy (N/A, 6/6/2023). Social History:   reports that he has never smoked. He has never used smokeless tobacco. He reports that he does not currently use alcohol after a past usage of about 2.0 standard drinks of alcohol per week. He reports that he does not use drugs.   Family history:

## 2023-10-07 NOTE — PROGRESS NOTES
Called Phlebotomist to inquire about lab orders needing to be collected. Wilber Cardenas stated that they have not been around to collect pts labs yet. This RN retrieved gold and purple top at this time.

## 2023-10-07 NOTE — PLAN OF CARE
Problem: Discharge Planning  Goal: Discharge to home or other facility with appropriate resources  10/7/2023 1556 by Brooks Piña RN  Outcome: Progressing  10/7/2023 0316 by Agustina Mark RN  Outcome: Progressing     Problem: Safety - Adult  Goal: Free from fall injury  10/7/2023 1556 by Brooks Piña RN  Outcome: Progressing  10/7/2023 0316 by Agustina Mark RN  Outcome: Progressing     Problem: Chronic Conditions and Co-morbidities  Goal: Patient's chronic conditions and co-morbidity symptoms are monitored and maintained or improved  10/7/2023 1556 by Brooks Piña RN  Outcome: Progressing  10/7/2023 0316 by Agustina Mark RN  Outcome: Progressing

## 2023-10-07 NOTE — PROGRESS NOTES
After the initiation of potassium drip, patient complains of pain on his IV site, decreased the rate of his IV infusion NP Bob Orozco informed, still the patient complains of burning pain on his IV site. NP ordered one dose of oral potassium. Patient agreeable to infuse one bag of potassium IV. Plan of care ongoing.

## 2023-10-07 NOTE — PROGRESS NOTES
Resource RN rounding on pt per [place] NP request for tachycardia and hands hurting. Patient using a urinal while sitting in the chair . He stated that his hand was cramped up and he needed help lifting the full urinal. 500cc of yellow urine noted. pT stated he has had cramping/ hands \"locking up\" since yesterday and a couple of times before being hospitalized however it has been more persistent this time. He has been using warm blankets and cream to help with the pain. Reviewed labs noted no cmp since 10/5 @1800 which electrolytes were stable and creat 1.6. New orders for cmp with mag .

## 2023-10-08 VITALS
HEART RATE: 91 BPM | DIASTOLIC BLOOD PRESSURE: 44 MMHG | BODY MASS INDEX: 33.8 KG/M2 | TEMPERATURE: 98 F | HEIGHT: 68 IN | RESPIRATION RATE: 19 BRPM | SYSTOLIC BLOOD PRESSURE: 113 MMHG | WEIGHT: 223 LBS | OXYGEN SATURATION: 90 %

## 2023-10-08 LAB
CULTURE: NORMAL
CULTURE: NORMAL
Lab: NORMAL
Lab: NORMAL
SPECIMEN: NORMAL
SPECIMEN: NORMAL

## 2023-10-08 PROCEDURE — 6370000000 HC RX 637 (ALT 250 FOR IP): Performed by: STUDENT IN AN ORGANIZED HEALTH CARE EDUCATION/TRAINING PROGRAM

## 2023-10-08 PROCEDURE — 6370000000 HC RX 637 (ALT 250 FOR IP): Performed by: INTERNAL MEDICINE

## 2023-10-08 PROCEDURE — 2700000000 HC OXYGEN THERAPY PER DAY

## 2023-10-08 PROCEDURE — 94640 AIRWAY INHALATION TREATMENT: CPT

## 2023-10-08 PROCEDURE — APPNB45 APP NON BILLABLE 31-45 MINUTES: Performed by: NURSE PRACTITIONER

## 2023-10-08 PROCEDURE — 99233 SBSQ HOSP IP/OBS HIGH 50: CPT | Performed by: INTERNAL MEDICINE

## 2023-10-08 PROCEDURE — 94761 N-INVAS EAR/PLS OXIMETRY MLT: CPT

## 2023-10-08 PROCEDURE — 2580000003 HC RX 258: Performed by: STUDENT IN AN ORGANIZED HEALTH CARE EDUCATION/TRAINING PROGRAM

## 2023-10-08 RX ORDER — DILTIAZEM HYDROCHLORIDE 180 MG/1
180 CAPSULE, COATED, EXTENDED RELEASE ORAL DAILY
Status: DISCONTINUED | OUTPATIENT
Start: 2023-10-08 | End: 2023-10-08 | Stop reason: HOSPADM

## 2023-10-08 RX ORDER — TAMSULOSIN HYDROCHLORIDE 0.4 MG/1
0.4 CAPSULE ORAL DAILY
Qty: 30 CAPSULE | Refills: 3 | Status: SHIPPED | OUTPATIENT
Start: 2023-10-09

## 2023-10-08 RX ORDER — OSELTAMIVIR PHOSPHATE 30 MG/1
30 CAPSULE ORAL 2 TIMES DAILY
Qty: 4 CAPSULE | Refills: 0 | Status: SHIPPED | OUTPATIENT
Start: 2023-10-08 | End: 2023-10-10

## 2023-10-08 RX ORDER — DILTIAZEM HYDROCHLORIDE 180 MG/1
180 CAPSULE, COATED, EXTENDED RELEASE ORAL DAILY
Qty: 30 CAPSULE | Refills: 3 | Status: SHIPPED | OUTPATIENT
Start: 2023-10-08

## 2023-10-08 RX ADMIN — SPIRONOLACTONE 25 MG: 50 TABLET ORAL at 10:35

## 2023-10-08 RX ADMIN — DILTIAZEM HYDROCHLORIDE 30 MG: 30 TABLET, FILM COATED ORAL at 00:52

## 2023-10-08 RX ADMIN — ALBUTEROL SULFATE 1 PUFF: 90 AEROSOL, METERED RESPIRATORY (INHALATION) at 15:08

## 2023-10-08 RX ADMIN — FUROSEMIDE 40 MG: 40 TABLET ORAL at 10:35

## 2023-10-08 RX ADMIN — METOLAZONE 2.5 MG: 5 TABLET ORAL at 10:36

## 2023-10-08 RX ADMIN — ROSUVASTATIN CALCIUM 10 MG: 5 TABLET, FILM COATED ORAL at 10:35

## 2023-10-08 RX ADMIN — PANTOPRAZOLE SODIUM 40 MG: 40 TABLET, DELAYED RELEASE ORAL at 06:20

## 2023-10-08 RX ADMIN — ASPIRIN 81 MG CHEWABLE TABLET 81 MG: 81 TABLET CHEWABLE at 10:35

## 2023-10-08 RX ADMIN — SODIUM CHLORIDE, PRESERVATIVE FREE 10 ML: 5 INJECTION INTRAVENOUS at 10:36

## 2023-10-08 RX ADMIN — OSELTAMIVIR PHOSPHATE 30 MG: 30 CAPSULE ORAL at 10:38

## 2023-10-08 RX ADMIN — TAMSULOSIN HYDROCHLORIDE 0.4 MG: 0.4 CAPSULE ORAL at 10:35

## 2023-10-08 RX ADMIN — ALBUTEROL SULFATE 1 PUFF: 90 AEROSOL, METERED RESPIRATORY (INHALATION) at 07:35

## 2023-10-08 RX ADMIN — MONTELUKAST 10 MG: 10 TABLET, FILM COATED ORAL at 10:36

## 2023-10-08 RX ADMIN — DILTIAZEM HYDROCHLORIDE 30 MG: 30 TABLET, FILM COATED ORAL at 06:20

## 2023-10-08 RX ADMIN — DILTIAZEM HYDROCHLORIDE 30 MG: 30 TABLET, FILM COATED ORAL at 10:35

## 2023-10-08 RX ADMIN — SERTRALINE HYDROCHLORIDE 150 MG: 50 TABLET ORAL at 10:35

## 2023-10-08 RX ADMIN — DILTIAZEM HYDROCHLORIDE 180 MG: 180 CAPSULE, COATED, EXTENDED RELEASE ORAL at 14:58

## 2023-10-08 RX ADMIN — SODIUM CHLORIDE, PRESERVATIVE FREE 10 ML: 5 INJECTION INTRAVENOUS at 00:22

## 2023-10-08 ASSESSMENT — ENCOUNTER SYMPTOMS
SORE THROAT: 0
EYE DISCHARGE: 0
WHEEZING: 0
CONSTIPATION: 0
BACK PAIN: 0
COUGH: 0
DIARRHEA: 0
NAUSEA: 0
SHORTNESS OF BREATH: 1
ABDOMINAL DISTENTION: 0

## 2023-10-08 NOTE — PROGRESS NOTES
recognition software and may contain errors related to that system including errors in grammar, punctuation, and spelling, as well as words and phrases that may be inappropriate. If there are any questions or concerns please feel free to contact the dictating provider for clarification. Review of Systems:    Review of Systems   Constitutional:  Negative for activity change, appetite change, fatigue and fever. HENT:  Negative for congestion and sore throat. Eyes:  Negative for discharge and visual disturbance. Respiratory:  Positive for shortness of breath. Negative for cough and wheezing. Cardiovascular:  Negative for chest pain, palpitations and leg swelling. Gastrointestinal:  Negative for abdominal distention, constipation, diarrhea and nausea. Genitourinary:  Negative for difficulty urinating and hematuria. Musculoskeletal:  Negative for arthralgias, back pain and myalgias. Neurological:  Negative for dizziness, syncope and headaches. Hematological:  Negative for adenopathy. Psychiatric/Behavioral:  Negative for agitation and confusion. Objective: Intake/Output Summary (Last 24 hours) at 10/8/2023 0935  Last data filed at 10/8/2023 0612  Gross per 24 hour   Intake --   Output 2200 ml   Net -2200 ml          Vitals:   Vitals:    10/08/23 0735   BP:    Pulse:    Resp:    Temp:    SpO2: 98%       Physical Exam:   Physical Exam  Vitals and nursing note reviewed. Constitutional:       General: He is not in acute distress. Appearance: Normal appearance. HENT:      Head: Normocephalic and atraumatic. Mouth/Throat:      Mouth: Mucous membranes are moist.   Eyes:      Extraocular Movements: Extraocular movements intact. Pupils: Pupils are equal, round, and reactive to light. Cardiovascular:      Rate and Rhythm: Regular rhythm. Tachycardia present. Pulses: Normal pulses. Heart sounds: Normal heart sounds.    Pulmonary:      Effort: Pulmonary effort is

## 2023-10-08 NOTE — PLAN OF CARE
Problem: Discharge Planning  Goal: Discharge to home or other facility with appropriate resources  10/8/2023 0222 by Patrice Velasco RN  Outcome: Progressing  10/7/2023 1556 by Israel Marquis RN  Outcome: Progressing     Problem: Safety - Adult  Goal: Free from fall injury  10/8/2023 0222 by Patrice Velasco RN  Outcome: Progressing  10/7/2023 1556 by Israel Marquis RN  Outcome: Progressing     Problem: Chronic Conditions and Co-morbidities  Goal: Patient's chronic conditions and co-morbidity symptoms are monitored and maintained or improved  10/8/2023 0222 by Patrice Velasco RN  Outcome: Progressing  10/7/2023 1556 by Israel Marquis RN  Outcome: Progressing

## 2023-10-08 NOTE — PROGRESS NOTES
Patient was seen in hospital for COPD  . I am prescribing oxygen because the diagnosis and testing requires the patient to have oxygen in the home. Conditions will improve or be benefited by oxygen use. The patient is able to perform good mobility and therefore requires the use of a portable oxygen system for ambulation.

## 2023-10-08 NOTE — PROGRESS NOTES
All paperwork has been signed and faxed to [facility] home medical for patients Home O2 needs. Please DO NOT let patient leave without their portable O2 at bedside upon discharge. IF portable tank is not at bedside upon discharge, please call [facility] at . IF patient does not get discharged today, this home O2 evaluation will have to be repeated tomorrow after 1300. Thank you.

## 2023-10-08 NOTE — DISCHARGE INSTRUCTIONS
speak with your doctor, nurse, or pharmacist if you have any questions about this medicine. IMPORTANT NOTE: This document tells you briefly how to take your medicine, but it does not tell you all there is to know about it. Your doctor or pharmacist may give you other documents about your medicine. Please talk to them if you have any questions. Always follow their advice. There is a more complete description of this medicine available in [facility]. Scan this code on your smartphone or tablet or use the web address below. You can also ask your pharmacist for a printout. If you have any questions, please ask your pharmacist.  The display and use of this drug information is subject to Terms of Use. More information about DILTIAZEM 24-HOUR SUSTAINED-ACTION CAPSULE - ORAL      Copyright(c) 2023 [employer]  Selected from data included with permission and copyright by [facility]. This copyrighted material has been downloaded from a licensed data provider and is not for distribution, except as may be authorized by the applicable terms of use. Conditions of Use: The information in this database is intended to supplement, not substitute for the expertise and judgment of healthcare professionals. The information is not intended to cover all possible uses, directions, precautions, drug interactions or adverse effects nor should it be construed to indicate that use of a particular drug is safe, appropriate or effective for you or anyone else. A healthcare professional should be consulted before taking any drug, changing any diet or commencing or discontinuing any course of treatment. The display and use of this drug information is subject to express Terms of Use. Care instructions adapted under license by [facility] ([facility]).  If you have questions about a medical condition or this instruction, always ask your healthcare professional. [address] any warranty or liability for your use of this

## 2023-10-08 NOTE — PROGRESS NOTES
Assessment/Plan  Elevated troponin 80, 74, 69  trending down. Conservative management, no work-up at this time. PAC's: Cardizem 180 mg daily, improved arrhythmia but still present. Hypomagnesia: resolved, recommend to keep mag between 2-2.2  Hypokalemia: resolved, recommend to keep potassium between 4-4.5. SOB: parainfluenza, primary following. BNP not elevated. Will go home on supplemental oxygen. HX intracranial hemorrhage S/P fall while on coumadin. Electronically signed by Winston Farah.  CON Licea - CNP on 10/8/2023 at 12:13 PM

## 2023-10-08 NOTE — PROGRESS NOTES
Cardiology Progress Note     Admit Date:  10/5/2023    Consult reason/ Seen today for :       Subjective and  Overnight Events :  HR is better, troponin trending down       Chief complain on admission : 68 y. o.year old who is admitted for  Chief Complaint   Patient presents with    Shortness of Breath     States was diagnosed with PNA      Assessment / Plan:  Elevated Troponin : trending down echo did not show any wall motion abnormality continue conservative management no further work-up at this time   Sinus with frequent PACs , change to cardizem cd 180 mg daily   History of multiple strokes and bleeds used to be on Coumadin but stopped as mentioned above  Hypoxia  pulmonary work-up as per primary team supportive care for parainfluenza  Check magnesium level replace magnesium  History of heart failure he is on significant diuretics including metolazone  History of alcoholic liver disease currently appears well compensated  HTN: stable, continue present medications   DVT prophylaxis if no contraindication  6. Dyslipidemia: continue statins      Past medical history:    has a past medical history of Diabetes mellitus ([address]), Hypertension, Kidney disease, Pleurisy with effusion, and Unspecified cerebral artery occlusion with cerebral infarction. Past surgical history:   has a past surgical history that includes Achilles tendon surgery (Right); Quadraceps tendon repair (Right); and Upper gastrointestinal endoscopy (N/A, 6/6/2023). Social History:   reports that he has never smoked. He has never used smokeless tobacco. He reports that he does not currently use alcohol after a past usage of about 2.0 standard drinks of alcohol per week. He reports that he does not use drugs. Family history:  family history includes Cancer in his father and mother; Diabetes in his mother; Heart Disease in his father.     Allergies   Allergen Reactions

## 2023-10-09 NOTE — DISCHARGE SUMMARY
V2.0  Discharge Summary    Name:  Estrada Fofana /Age/Sex: 1950 (68 y.o. male)   Admit Date: 10/5/2023  Discharge Date: 10/9/23    MRN & CSN:  [id number] & [id number] Encounter Date and Time 10/9/23 1:31 PM EDT    Attending:  No att. providers found Discharging Provider: Rita Mcintosh MD       Hospital Course:     Brief HPI: Estrada Fofana is a 68 y.o. male who presented with viral PNA. Patient was able to ambulate in the hallway using his walker without any difficulty. Patient optimized for discharge home    Brief Problem Based Course:   # Acute hypoxemic respiratory failure likely secondary to influenza A pneumonia  Sepsis  - Endorsed worsening SOB over the past few days with productive cough of white sputum. No fevers. Not on oxygen at baseline. - SpO2 86% on RA, requiring 2L NC in ED. Afebrile, no leukocytosis, PCT normal. CXR with possible bibasilar infiltrates. - LA elevated on admission 2.6, now WNL  - Started on Tamiflu and Rocephin/Azithro in ED, continue with Tamiflu x5 days. Isolation.  -Patient qualifies for home O2 eval     Tachycardia  Elevated troponin  -Appears to be new onset, started on 10/6, asymptomatic  -EKG notable for sinus tachycardia with PACs  -Troponin elevated on admission, will repeat  -Patient denies any symptoms,   -will consult cardiology, appreciate recs, started on cardizem, no ACS suspected  -Discussed with cardiology, patient condition from short acting diltiazem to long-acting 180 daily. # Mild intermittent asthma  - No evidence of exacerbation. Continue home inhalers.      # Recent traumatic intracranial hemorrhage in 2023  # history of multiple chronic areas of strokes (left frontal stroke; right basal ganglia stroke; right thalamic stroke; right cerebellum stroke)  - Had fall while on Coumadin leading to multiple areas of intracranial traumatic bleed (bifrontal subdural hematoma and left frontal subarachnoid hemorrhage), admitted at [facility].  - Currently

## 2023-10-10 LAB
CULTURE: NORMAL
CULTURE: NORMAL
Lab: NORMAL
Lab: NORMAL
SPECIMEN: NORMAL
SPECIMEN: NORMAL

## 2023-10-13 ENCOUNTER — TELEPHONE (OUTPATIENT)
Dept: PULMONOLOGY | Age: 73
End: 2023-10-13

## 2023-10-13 NOTE — TELEPHONE ENCOUNTER
Pt's wife called in wanting an order sent to [address] for a smaller machine, states he can't come in to the office without one. I informed pt we would be unable to send over an order until Monday morning.  Rescheduled appt for a week out

## 2023-10-23 ENCOUNTER — OFFICE VISIT (OUTPATIENT)
Dept: PULMONOLOGY | Age: 73
End: 2023-10-23
Payer: MEDICARE

## 2023-10-23 VITALS
HEIGHT: 68 IN | HEART RATE: 79 BPM | WEIGHT: 214.4 LBS | DIASTOLIC BLOOD PRESSURE: 52 MMHG | OXYGEN SATURATION: 92 % | SYSTOLIC BLOOD PRESSURE: 120 MMHG | BODY MASS INDEX: 32.49 KG/M2

## 2023-10-23 DIAGNOSIS — J90 PLEURISY WITH EFFUSION: ICD-10-CM

## 2023-10-23 DIAGNOSIS — R91.1 NODULE OF UPPER LOBE OF LEFT LUNG: ICD-10-CM

## 2023-10-23 DIAGNOSIS — Z09 HOSPITAL DISCHARGE FOLLOW-UP: Primary | ICD-10-CM

## 2023-10-23 DIAGNOSIS — J96.11 CHRONIC HYPOXEMIC RESPIRATORY FAILURE (HCC): ICD-10-CM

## 2023-10-23 PROCEDURE — 1123F ACP DISCUSS/DSCN MKR DOCD: CPT | Performed by: NURSE PRACTITIONER

## 2023-10-23 PROCEDURE — 3074F SYST BP LT 130 MM HG: CPT | Performed by: NURSE PRACTITIONER

## 2023-10-23 PROCEDURE — 99214 OFFICE O/P EST MOD 30 MIN: CPT | Performed by: NURSE PRACTITIONER

## 2023-10-23 PROCEDURE — 3078F DIAST BP <80 MM HG: CPT | Performed by: NURSE PRACTITIONER

## 2023-10-23 ASSESSMENT — ENCOUNTER SYMPTOMS: SHORTNESS OF BREATH: 1

## 2023-10-30 ENCOUNTER — HOSPITAL ENCOUNTER (OUTPATIENT)
Dept: CT IMAGING | Age: 73
Discharge: HOME | End: 2023-10-30
Attending: INTERNAL MEDICINE
Payer: MEDICARE

## 2023-10-30 DIAGNOSIS — R91.1 NODULE OF UPPER LOBE OF LEFT LUNG: ICD-10-CM

## 2023-10-30 DIAGNOSIS — R59.0 MEDIASTINAL ADENOPATHY: ICD-10-CM

## 2023-10-30 DIAGNOSIS — J90 RECURRENT RIGHT PLEURAL EFFUSION: ICD-10-CM

## 2023-10-30 DIAGNOSIS — J45.20 MILD INTERMITTENT ASTHMA WITHOUT COMPLICATION: ICD-10-CM

## 2023-10-30 DIAGNOSIS — J90 PLEURISY WITH EFFUSION: ICD-10-CM

## 2023-10-30 PROCEDURE — 71250 CT THORAX DX C-: CPT

## 2023-11-08 ENCOUNTER — OFFICE VISIT (OUTPATIENT)
Dept: PULMONOLOGY | Age: 73
End: 2023-11-08
Payer: MEDICARE

## 2023-11-08 VITALS
BODY MASS INDEX: 32.13 KG/M2 | HEART RATE: 111 BPM | SYSTOLIC BLOOD PRESSURE: 128 MMHG | RESPIRATION RATE: 16 BRPM | DIASTOLIC BLOOD PRESSURE: 60 MMHG | HEIGHT: 68 IN | OXYGEN SATURATION: 93 % | WEIGHT: 212 LBS

## 2023-11-08 DIAGNOSIS — G47.33 OBSTRUCTIVE SLEEP APNEA: ICD-10-CM

## 2023-11-08 DIAGNOSIS — J90 RECURRENT RIGHT PLEURAL EFFUSION: Primary | ICD-10-CM

## 2023-11-08 DIAGNOSIS — J96.11 CHRONIC HYPOXEMIC RESPIRATORY FAILURE (HCC): ICD-10-CM

## 2023-11-08 DIAGNOSIS — J45.20 MILD INTERMITTENT ASTHMA WITHOUT COMPLICATION: ICD-10-CM

## 2023-11-08 PROCEDURE — 3078F DIAST BP <80 MM HG: CPT | Performed by: INTERNAL MEDICINE

## 2023-11-08 PROCEDURE — 99213 OFFICE O/P EST LOW 20 MIN: CPT | Performed by: INTERNAL MEDICINE

## 2023-11-08 PROCEDURE — 1123F ACP DISCUSS/DSCN MKR DOCD: CPT | Performed by: INTERNAL MEDICINE

## 2023-11-08 PROCEDURE — 3074F SYST BP LT 130 MM HG: CPT | Performed by: INTERNAL MEDICINE

## 2023-11-20 ENCOUNTER — TELEPHONE (OUTPATIENT)
Dept: PULMONOLOGY | Age: 73
End: 2023-11-20

## 2023-11-20 NOTE — TELEPHONE ENCOUNTER
Spoke to [employer] Scheduling, scheduled thoracentesis for Wed 6:30 AM arrival time. Called and spoke to [place] ([facility]) who states Wednesday would not work for them. They have to arrange transportation. Called IR scheduling back, no answer. LVM for them to return our call to the office to reschedule or to call the patient themselves to reschedule. Waiting to hear back.

## 2023-11-22 PROBLEM — Z09 HOSPITAL DISCHARGE FOLLOW-UP: Status: RESOLVED | Noted: 2018-06-06 | Resolved: 2023-11-22

## 2023-11-27 NOTE — PROGRESS NOTES
IR Procedure at [facility]:  Left message for patient to arrive at 1200 at [facility] on 11/29/2023 for his procedure at 1330. Also went over below instructions and told patient to take his medications as scheduled. (Paracentesis, Thoracentesis, Thyroid Bx and Injections may have a light breakfast)  2. Follow your directions as prescribed by the doctor for your procedure and medications. 3.   Consult your provider as to when to stop blood thinner  4. Do not take any pain medication within 6 hours of your procedure  5. Do not drink any alcoholic beverages or use any street drugs 24 hours before procedure. 6.   Please wear simple, loose fitting clothing to the hospital.  Do not bring valuables (money,             credit cards, checkbooks, etc.)     7. If you  have a Living Will and Durable Power of  for Healthcare, please bring in a copy. 8.   Please bring picture ID,  insurance card, paperwork from the doctors office            (H & P, Consent,  & card for implantable devices). 9.   Report to the information desk on the ground floor. 10. Take a shower the night before or morning of your procedure, do not apply any lotion, oil or powder. 11. If you are going to be sedated for the procedure, you will need a responsible adult to drive you home.

## 2023-11-29 ENCOUNTER — HOSPITAL ENCOUNTER (OUTPATIENT)
Dept: INTERVENTIONAL RADIOLOGY/VASCULAR | Age: 73
Discharge: HOME | End: 2023-11-29
Payer: MEDICARE

## 2023-11-29 ENCOUNTER — HOSPITAL ENCOUNTER (OUTPATIENT)
Dept: GENERAL RADIOLOGY | Age: 73
Discharge: HOME | End: 2023-11-29
Attending: RADIOLOGY
Payer: MEDICARE

## 2023-11-29 VITALS
OXYGEN SATURATION: 99 % | DIASTOLIC BLOOD PRESSURE: 68 MMHG | RESPIRATION RATE: 14 BRPM | SYSTOLIC BLOOD PRESSURE: 125 MMHG | HEART RATE: 100 BPM | TEMPERATURE: 98 F

## 2023-11-29 DIAGNOSIS — J90 PLEURAL EFFUSION: ICD-10-CM

## 2023-11-29 LAB
ALBUMIN FLUID: 1.8 GM/DL
AMYLASE FLUID: 32 U/L
APTT: 36.1 SECONDS (ref 25.1–37.1)
FLUID TYPE: ABNORMAL INDEX
GLUCOSE, FLUID: 193 MG/DL
GLUCOSE, FLUID: 199 MG/DL
HCT VFR BLD CALC: 34.6 % (ref 42–52)
HEMOGLOBIN: 11.1 GM/DL (ref 13.5–18)
INR BLD: 1.1 INDEX
LACTATE DEHYDROGENASE, FLUID: 1046 IU/L
LACTATE DEHYDROGENASE, FLUID: 1078 IU/L
LYMPHOCYTES, BODY FLUID: 85 %
MCH RBC QN AUTO: 36.5 PG (ref 27–31)
MCHC RBC AUTO-ENTMCNC: 32.1 % (ref 32–36)
MCV RBC AUTO: 113.8 FL (ref 78–100)
MESOTHELIAL FLUID: 0 /100 WBC
MONOCYTE, FLUID: 7 %
NEUTROPHIL, FLUID: 8 %
OTHER CELLS FLUID: 0
PDW BLD-RTO: 15 % (ref 11.7–14.9)
PLATELET # BLD: 173 K/CU MM (ref 140–440)
PMV BLD AUTO: 10.7 FL (ref 7.5–11.1)
PROTEIN FLUID: 3.9 G/DL
PROTEIN FLUID: 3.9 GM/DL
PROTHROMBIN TIME: 14 SECONDS (ref 11.7–14.5)
RBC # BLD: 3.04 M/CU MM (ref 4.6–6.2)
RBC FLUID: ABNORMAL /CU MM
SOURCE FLUID: NORMAL
WBC # BLD: 10.5 K/CU MM (ref 4–10.5)
WBC FLUID: 918 /CU MM

## 2023-11-29 PROCEDURE — 2500000003 HC RX 250 WO HCPCS: Performed by: RADIOLOGY

## 2023-11-29 PROCEDURE — 71045 X-RAY EXAM CHEST 1 VIEW: CPT

## 2023-11-29 PROCEDURE — 84157 ASSAY OF PROTEIN OTHER: CPT

## 2023-11-29 PROCEDURE — 82150 ASSAY OF AMYLASE: CPT

## 2023-11-29 PROCEDURE — 2709999900 IR GUIDED THORACENTESIS PLEURAL

## 2023-11-29 PROCEDURE — 83615 LACTATE (LD) (LDH) ENZYME: CPT

## 2023-11-29 PROCEDURE — 87070 CULTURE OTHR SPECIMN AEROBIC: CPT

## 2023-11-29 PROCEDURE — 85027 COMPLETE CBC AUTOMATED: CPT

## 2023-11-29 PROCEDURE — 89051 BODY FLUID CELL COUNT: CPT

## 2023-11-29 PROCEDURE — 32555 ASPIRATE PLEURA W/ IMAGING: CPT

## 2023-11-29 PROCEDURE — 87205 SMEAR GRAM STAIN: CPT

## 2023-11-29 PROCEDURE — 85730 THROMBOPLASTIN TIME PARTIAL: CPT

## 2023-11-29 PROCEDURE — 82945 GLUCOSE OTHER FLUID: CPT

## 2023-11-29 PROCEDURE — 85610 PROTHROMBIN TIME: CPT

## 2023-11-29 PROCEDURE — 82042 OTHER SOURCE ALBUMIN QUAN EA: CPT

## 2023-11-29 RX ORDER — LIDOCAINE HYDROCHLORIDE 10 MG/ML
INJECTION, SOLUTION EPIDURAL; INFILTRATION; INTRACAUDAL; PERINEURAL PRN
Status: COMPLETED | OUTPATIENT
Start: 2023-11-29 | End: 2023-11-29

## 2023-11-29 RX ADMIN — LIDOCAINE HYDROCHLORIDE 8 ML: 10 INJECTION, SOLUTION EPIDURAL; INFILTRATION; INTRACAUDAL; PERINEURAL at 14:02

## 2023-11-29 NOTE — OR NURSING
Pt had a right thoracentesis draining light brown fluid by Dr Jersey Caldwell. Pleural fluid sent to cytology.

## 2023-11-29 NOTE — PROGRESS NOTES
Patient may be discharged per Dr. El Romero at this time. Discharge instructions reviewed, questions answered and patient verbalized understanding.

## 2023-11-29 NOTE — PROGRESS NOTES
Harrison Riedel at bedside. CXR completed.  Dr. Jennifer Blevins aware and waiting for him to review

## 2023-11-29 NOTE — PROGRESS NOTES
Consultation - Pulmonary Medicine   Oliver Castaneda 63 y o  male MRN: [id number]  Unit/Bed#: -01 Encounter: [id number]      Assessment:  Acute pulmonary embolism, unprovoked  Asthma/COPD overlap  Tobacco abuse    Plan:   Patient is not hypoxic or requiring any supplemental oxygen  CTA with filling defects involving anterior subsegmental branches of right lower lobe suspicious for pulmonary embolism  Emphysema also seen  Patient has been started on Lovenox  Case management consulted to determine NOAC pricing  Follow-up on echo to determine if there is any R heart strain  Lower extremity dopplers ordered  He will need at least 3 months of anticoagulation, will need to re-evaluated at that time and will need a thrombosis panel once of the anticoagulation to determine long-term needs  Follows with Dr Leandro Dubose as an outpatient, continue Breo, Spiriva, Singulair, Xopenex, Mucinex  Nicotine replacement therapy, smoking cessation discussed    He will need outpatient pulmonary follow-up with Dr Leandro Dubose    Discussed case with SLIM    History of Present Illness   Physician Requesting Consult: Johnson Mackey MD  Reason for Consult / Principal Problem: Pulmonary embolism  Hx and PE limited by: none  HPI: Oliver Castaneda is a 63y o  year old male current smoker with past medical history including COPD/asthma, ischemic cardiomyopathy, seasonal and environmental allergies who presents with shortness of breath  Few days prior to arrival, patient was seen in urgent care for COPD/asthma exacerbation  There was improvement with prednisone and nebulizer treatments however the day of ED arrival, he developed suddenly worsening severe shortness of breath  A CTA revealed acute pulmonary embolism  He has been started on therapeutic anticoagulation  He is feeling a little better  There is no chest pain or hemoptysis  Patient denies any prior history of VTE    There are no provoking factors for pulmonary embolism including recent Patient discharged to home with wife at this time. hospital admission, surgical procedure, period of inactivity, or travel  Patient states he follows through with age-appropriate cancer screenings  He is up-to-date on his colonoscopies  He believes there is a family history of his father with an unprovoked DVT  Patient smokes less than half pack per day and would like to quit with nicotine patches following discharge  He follows up with Dr Rajiv Forbes, reports that his COPD/asthma has been pretty well controlled other than the recent exacerbation  Inpatient consult to Pulmonology  Consult performed by: Cassandra Avila PA-C  Consult ordered by: Windy Brasher PA-C          Review of Systems   Constitutional: Negative for fever  Respiratory: Positive for cough, shortness of breath and wheezing  Cardiovascular: Negative for chest pain and leg swelling  All other systems reviewed and are negative  Historical Information   Past Medical History:   Diagnosis Date    Asthma     Cardiomyopathy ([facility] )     COPD (chronic obstructive pulmonary disease) ([facility] )     Hypertension      History reviewed  No pertinent surgical history  Social History   Social History     Substance and Sexual Activity   Alcohol Use No     Social History     Substance and Sexual Activity   Drug Use No     Social History     Tobacco Use   Smoking Status Current Every Day Smoker    Packs/day: 0 50    Types: Cigarettes   Smokeless Tobacco Never Used     Occupational History:  Works at [employer]  Family History:   Family History   Problem Relation Age of Onset    Deep vein thrombosis Father        Meds/Allergies   all current active meds have been reviewed    Allergies   Allergen Reactions    Naproxen GI Intolerance       Objective   Vitals: Blood pressure 143/67, pulse 75, temperature 97 9 °F (36 6 °C), resp  rate 20, height 5' 10" (1 778 m), weight 72 7 kg (160 lb 4 4 oz), SpO2 95 %  ,Body mass index is 23 kg/m²    No intake or output data in the 24 hours ending 01/13/20 1537  Invasive Devices     Peripheral Intravenous Line            Peripheral IV 01/12/20 Right Antecubital less than 1 day                Physical Exam   Constitutional: He is oriented to person, place, and time  He appears well-developed and well-nourished  No distress  HENT:   Head: Normocephalic and atraumatic  Right Ear: External ear normal    Left Ear: External ear normal    Nose: Nose normal    Eyes: Conjunctivae and EOM are normal  Right eye exhibits no discharge  Left eye exhibits no discharge  No scleral icterus  Neck: Normal range of motion  Neck supple  No tracheal deviation present  Cardiovascular: Normal rate, regular rhythm and normal heart sounds  Exam reveals no gallop and no friction rub  No murmur heard  Pulmonary/Chest: Effort normal  No stridor  No respiratory distress  He has wheezes  Abdominal: He exhibits no distension  There is no guarding  Musculoskeletal: Normal range of motion  He exhibits no edema, tenderness or deformity  Neurological: He is alert and oriented to person, place, and time  No cranial nerve deficit  He exhibits normal muscle tone  Skin: Skin is warm and dry  No rash noted  He is not diaphoretic  No erythema  No pallor  Psychiatric: He has a normal mood and affect  His behavior is normal  Judgment and thought content normal    Nursing note and vitals reviewed  Lab Results: I have personally reviewed pertinent lab results  Imaging Studies: I have personally reviewed pertinent reports  EKG, Pathology, and Other Studies: I have personally reviewed pertinent reports      VTE Prophylaxis: Enoxaparin (Lovenox) - therapeutic    Code Status: Level 1 - Full Code  Advance Directive and Living Will:      Power of :    POLST:

## 2023-11-29 NOTE — PROGRESS NOTES
Pt. returned to room in cath lab holding. Tolerated procedure well. PCXR ordered. Hoping to discharge soon.

## 2023-11-29 NOTE — PROGRESS NOTES
TRANSFER - OUT REPORT:    Verbal report given to [address] on [address] [place] [address]. being transferred to [facility] for routine progression of patient care       Report consisted of patient's Situation, Background, Assessment and   Recommendations(SBAR). Pt had right thoracentesis by Dr Xin Fall with 325cc of light brown fluid drained. PT tolerated procedure, vss, and no bleeding noted at site. Bandaid applied to site, clean dry and intact. Information from the following report(s) Nurse Handoff Report was reviewed with the receiving nurse. Opportunity for questions and clarification was provided.       Patient transported with:   O2 @ 2 liters  Registered Nurse

## 2023-12-01 NOTE — H&P
Date:12/1/2023  Name:Balta Meier Sr.   IBR:9/1/0327   UI#:[id number]    SEX:male   Referring Physician:  Nader Minar  Primary Physician:  Mike Johnstno  Chief Complaint:  right pleural effusion  History of Present Illness:   right pleural effusion    HISTORY AND PHYSICAL  No admission diagnoses are documented for this encounter.     Past Medical History:  Past Medical History:   Diagnosis Date    Diabetes mellitus ([address])     Hypertension     Kidney disease     patient sees Dr Gera Celis with effusion 9/27/2023    Unspecified cerebral artery occlusion with cerebral infarction        Past Surgical History:  Past Surgical History:   Procedure Laterality Date    ACHILLES TENDON SURGERY Right     QUADRACEPS TENDON REPAIR Right     UPPER GASTROINTESTINAL ENDOSCOPY N/A 6/6/2023    EGD BIOPSY performed by Sirisha Jain MD at [facility] ENDOSCOPY       Social History:  Social History     Socioeconomic History    Marital status:      Spouse name: Not on file    Number of children: Not on file    Years of education: Not on file    Highest education level: Not on file   Occupational History    Not on file   Tobacco Use    Smoking status: Never    Smokeless tobacco: Never   Vaping Use    Vaping Use: Never used   Substance and Sexual Activity    Alcohol use: Not Currently     Alcohol/week: 2.0 standard drinks of alcohol     Types: 2 Glasses of wine per week    Drug use: No    Sexual activity: Yes     Partners: Female   Other Topics Concern    Not on file   Social History Narrative    Not on file     Social Determinants of Health     Financial Resource Strain: Not on file   Food Insecurity: Not on file   Transportation Needs: Not on file   Physical Activity: Not on file   Stress: Not on file   Social Connections: Not on file   Intimate Partner Violence: Not on file   Housing Stability: Not on file       Family History:  Family History   Problem Relation Age of Onset    Diabetes Mother     Cancer Mother     Heart Disease

## 2023-12-02 LAB
CULTURE: NORMAL
Lab: NORMAL
SPECIMEN: NORMAL

## 2023-12-06 ENCOUNTER — OFFICE VISIT (OUTPATIENT)
Dept: PULMONOLOGY | Age: 73
End: 2023-12-06
Payer: MEDICARE

## 2023-12-06 VITALS — HEART RATE: 101 BPM | DIASTOLIC BLOOD PRESSURE: 60 MMHG | OXYGEN SATURATION: 97 % | SYSTOLIC BLOOD PRESSURE: 112 MMHG

## 2023-12-06 DIAGNOSIS — R06.02 SOBOE (SHORTNESS OF BREATH ON EXERTION): ICD-10-CM

## 2023-12-06 DIAGNOSIS — E66.9 OBESITY (BMI 30-39.9): ICD-10-CM

## 2023-12-06 DIAGNOSIS — G47.33 OSA (OBSTRUCTIVE SLEEP APNEA): ICD-10-CM

## 2023-12-06 DIAGNOSIS — R91.1 NODULE OF UPPER LOBE OF LEFT LUNG: ICD-10-CM

## 2023-12-06 DIAGNOSIS — J90 RECURRENT RIGHT PLEURAL EFFUSION: ICD-10-CM

## 2023-12-06 DIAGNOSIS — G47.10 HYPERSOMNIA: ICD-10-CM

## 2023-12-06 DIAGNOSIS — R59.0 MEDIASTINAL ADENOPATHY: Primary | ICD-10-CM

## 2023-12-06 PROCEDURE — 3078F DIAST BP <80 MM HG: CPT | Performed by: INTERNAL MEDICINE

## 2023-12-06 PROCEDURE — 3074F SYST BP LT 130 MM HG: CPT | Performed by: INTERNAL MEDICINE

## 2023-12-06 PROCEDURE — 1123F ACP DISCUSS/DSCN MKR DOCD: CPT | Performed by: INTERNAL MEDICINE

## 2023-12-06 PROCEDURE — 99215 OFFICE O/P EST HI 40 MIN: CPT | Performed by: INTERNAL MEDICINE

## 2023-12-06 ASSESSMENT — ENCOUNTER SYMPTOMS
EYE ITCHING: 0
EYE DISCHARGE: 0
SHORTNESS OF BREATH: 1
COUGH: 1
ABDOMINAL PAIN: 0
BACK PAIN: 0
ABDOMINAL DISTENTION: 0

## 2023-12-06 NOTE — ASSESSMENT & PLAN NOTE
Its not clear at this time the cause as his recent right pleural effusion is Lymphocytic predominant  I\"ll repeat CT chest in 6 months

## 2023-12-06 NOTE — ASSESSMENT & PLAN NOTE
He has symptoms of PRATEEK with EDS  Advised to go for the PSG  Loose weight Labs reviewed and CPK founf to be 5996. Ordered second bolus and spoke with attending about results. pt appears well and wants to go home to baby. Kidney function is normal. Pt advised to continue hydration at home. She was given strict return precautions. Labs reviewed and CPK found to be 5996. Ordered second bolus and spoke with attending about results. pt appears well and wants to go home to baby. Kidney function is normal. Pt advised to continue hydration at home. She was given strict return precautions. Pt requesting repeat CPK level after IV fluids. CPK is now 4281. Will dc with PMD f/u and advised continued hydration.

## 2023-12-06 NOTE — PROGRESS NOTES
Stephen Gibson Sr.  1950  Referring Provider: MEME Lizarraga - General     Subjective:     Chief Complaint   Patient presents with    Follow-up     3 week f/u thoracentesis       HPI  Daniela Holloway is a 68 y.o. male has come back as a follow up. He was seen by Dr. Kenji Casillas before. He had a CT chest done on 11/01/23 which showed: Moderate size pleural effusion on the right side with right sided rounded  atelectasis. On review of prior CT of the thoracic spine similar findings  are noted. Mildly prominent paratracheal lymph nodes. Enlarged nodes are also  demonstrated on prior CT of the thoracic spine. Mild nodularity to the surface of the liver. He used to drink alcohol. He had a right thoracentesis and it showed that his LDH is 1078 and exudate, lymphocytic predominant. He has no Pleural fluid cytology yet    He had no h/o smoking but had second had smoking exposure. He is on 2 L.min of oxygen since Aug'23. He has cough, little phlegm-clear, no hemoptysis, no loss of weight, good appetite, SOBOE some. He has no snoring, not stopping breathing. He takes care of his wife with Parkinsons disease. He is tired on getting in the morning. He is sleepy and tired during the day time.     Current Outpatient Medications   Medication Sig Dispense Refill    dilTIAZem (CARDIZEM CD) 180 MG extended release capsule Take 1 capsule by mouth daily 30 capsule 3    tamsulosin (FLOMAX) 0.4 MG capsule Take 1 capsule by mouth daily 30 capsule 3    metOLazone (ZAROXOLYN) 2.5 MG tablet Take 1 tablet by mouth daily 90 tablet 3    furosemide (LASIX) 40 MG tablet Take 1 tablet by mouth 2 times daily 60 tablet 5    albuterol sulfate HFA (PROVENTIL;VENTOLIN;PROAIR) 108 (90 Base) MCG/ACT inhaler Inhale 2 puffs into the lungs every 4 hours as needed for Wheezing 1 each 11    umeclidinium-vilanterol (ANORO ELLIPTA) 62.5-25 MCG/ACT inhaler Inhale 1 puff into the lungs daily 1 each 5    montelukast (SINGULAIR) 10 MG

## 2023-12-07 ENCOUNTER — OFFICE VISIT (OUTPATIENT)
Dept: CARDIOLOGY CLINIC | Age: 73
End: 2023-12-07
Payer: MEDICARE

## 2023-12-07 VITALS
BODY MASS INDEX: 33.19 KG/M2 | HEART RATE: 94 BPM | DIASTOLIC BLOOD PRESSURE: 64 MMHG | HEIGHT: 68 IN | OXYGEN SATURATION: 92 % | SYSTOLIC BLOOD PRESSURE: 126 MMHG | WEIGHT: 219 LBS

## 2023-12-07 DIAGNOSIS — S06.5XAA SUBDURAL HEMATOMA (HCC): ICD-10-CM

## 2023-12-07 DIAGNOSIS — E08.00 DIABETES MELLITUS DUE TO UNDERLYING CONDITION WITH HYPEROSMOLARITY WITHOUT COMA, WITHOUT LONG-TERM CURRENT USE OF INSULIN (HCC): ICD-10-CM

## 2023-12-07 DIAGNOSIS — I48.91 ATRIAL FIBRILLATION, UNSPECIFIED TYPE (HCC): Primary | ICD-10-CM

## 2023-12-07 PROCEDURE — 3074F SYST BP LT 130 MM HG: CPT | Performed by: INTERNAL MEDICINE

## 2023-12-07 PROCEDURE — 3078F DIAST BP <80 MM HG: CPT | Performed by: INTERNAL MEDICINE

## 2023-12-07 PROCEDURE — 1123F ACP DISCUSS/DSCN MKR DOCD: CPT | Performed by: INTERNAL MEDICINE

## 2023-12-07 PROCEDURE — 99214 OFFICE O/P EST MOD 30 MIN: CPT | Performed by: INTERNAL MEDICINE

## 2023-12-07 NOTE — PATIENT INSTRUCTIONS
**It is YOUR responsibilty to bring medication bottles and/or updated medication list to [address] [place] [address]. This will allow us to better serve you and all your healthcare needs**   [facility] Laboratory Locations - No appointment necessary. Sites open Monday to Friday. Call your preferred location for test preparation, business   hours and other information you need. [employer] accepts 's. [address] [place] [address]. [address] [place], [address] [facility]  Phone: [phone]    Thank you for allowing us to care for you today! We want to ensure we can follow your treatment plan and we strive to give you the best outcomes and experience possible. If you ever have a life threatening emergency and call 911 - for an ambulance (EMS)   Our providers can only care for you at:   [facility] or [facility]. Even if you have someone take you or you drive yourself we can only care for you in a [facility]. Our providers are not setup at the other healthcare locations! Please be informed that if you contact our office outside of normal business hours the physician on call cannot help with any scheduling or rescheduling issues, procedure instruction questions or any type of medication issue. We advise you for any urgent/emergency that you go to the nearest emergency room! PLEASE CALL OUR OFFICE DURING NORMAL BUSINESS HOURS    Monday - Friday   8 am to 5 pm    [place]: [address] [place]: [phone]    [place]:  [phone]  We are committed to providing you the best care possible. If you receive a survey after visiting one of our offices, please take time to share your experience concerning your physician office visit. These surveys are confidential and no health information about you is shared. We are eager to improve for you and we are counting on your feedback to help make that happen.

## 2023-12-07 NOTE — PROGRESS NOTES
Brenda Young MD        OFFICE  FOLLOWUP NOTE    Chief complaints: patient is here for management of shortness of breath, stasis dermatitis, DM, seizures, afib    Subjective: patient feels better, no chest pain, no shortness of breath, no dizziness, no palpitations    HPI Hesham Mariano is a 68 y. o.year old who  has a past medical history of Diabetes mellitus ([address]), Hypertension, Kidney disease, Pleurisy with effusion, and Unspecified cerebral artery occlusion with cerebral infarction.  and presents for management of shortness of breath, stasis dermatitis, DM, seizureswhich are well controlled    His holter monitor showed 100% afib    Current Outpatient Medications   Medication Sig Dispense Refill    dilTIAZem (CARDIZEM CD) 180 MG extended release capsule Take 1 capsule by mouth daily 30 capsule 3    tamsulosin (FLOMAX) 0.4 MG capsule Take 1 capsule by mouth daily 30 capsule 3    metOLazone (ZAROXOLYN) 2.5 MG tablet Take 1 tablet by mouth daily 90 tablet 3    furosemide (LASIX) 40 MG tablet Take 1 tablet by mouth 2 times daily 60 tablet 5    albuterol sulfate HFA (PROVENTIL;VENTOLIN;PROAIR) 108 (90 Base) MCG/ACT inhaler Inhale 2 puffs into the lungs every 4 hours as needed for Wheezing 1 each 11    umeclidinium-vilanterol (ANORO ELLIPTA) 62.5-25 MCG/ACT inhaler Inhale 1 puff into the lungs daily 1 each 5    montelukast (SINGULAIR) 10 MG tablet Take 1 tablet by mouth daily 30 tablet 5    levETIRAcetam (KEPPRA) 500 MG tablet       lactulose (CHRONULAC) 10 GM/15ML solution Take 15 mLs by mouth 3 times daily 250 mL 1    spironolactone (ALDACTONE) 25 MG tablet TAKE ONE TABLET BY MOUTH TWICE A  tablet 3    Multiple Vitamins-Minerals (PRESERVISION AREDS PO) Take 2 tablets by mouth daily      fluticasone (FLONASE) 50 MCG/ACT nasal spray 2 sprays by Each Nostril route as needed      sertraline (ZOLOFT) 100 MG tablet Take 1.5 tablets by mouth daily      triamcinolone (KENALOG) 0.1 % lotion Apply topically 3

## 2023-12-08 LAB
CULTURE: ABNORMAL
GRAM SMEAR: ABNORMAL
Lab: ABNORMAL
SPECIMEN: ABNORMAL

## 2023-12-22 ENCOUNTER — INITIAL CONSULT (OUTPATIENT)
Dept: CARDIOLOGY CLINIC | Age: 73
End: 2023-12-22

## 2023-12-22 VITALS
HEART RATE: 103 BPM | OXYGEN SATURATION: 90 % | DIASTOLIC BLOOD PRESSURE: 60 MMHG | RESPIRATION RATE: 18 BRPM | SYSTOLIC BLOOD PRESSURE: 120 MMHG | WEIGHT: 213.8 LBS | BODY MASS INDEX: 32.4 KG/M2 | HEIGHT: 68 IN

## 2023-12-22 DIAGNOSIS — I48.0 PAF (PAROXYSMAL ATRIAL FIBRILLATION) (HCC): Primary | ICD-10-CM

## 2023-12-22 DIAGNOSIS — I48.91 ATRIAL FIBRILLATION, UNSPECIFIED TYPE (HCC): ICD-10-CM

## 2023-12-22 RX ORDER — CLOPIDOGREL BISULFATE 75 MG/1
75 TABLET ORAL DAILY
Qty: 30 TABLET | Refills: 5 | Status: SHIPPED | OUTPATIENT
Start: 2023-12-22

## 2023-12-22 NOTE — PATIENT INSTRUCTIONS
[facility] Laboratory Locations - No appointment necessary. Sites open Monday to Friday. Call your preferred location for test preparation, business   hours and other information you need. [employer] accepts [employer]. [address] [facility]. [address]. [place], [facility]  Phone: [phone]     **It is YOUR responsibilty to bring medication bottles and/or updated medication list to [address]. This will allow us to better serve you and all your healthcare needs**  Please be informed that if you contact our office outside of normal business hours the physician on call cannot help with any scheduling or rescheduling issues, procedure instruction questions or any type of medication issue. We advise you for any urgent/emergency that you go to the nearest emergency room! PLEASE CALL OUR OFFICE DURING NORMAL BUSINESS HOURS    Monday - Friday   8 am to 5 pm    [place]: [address] [place]: [phone]    [place]:  [phone]  Thank you for allowing us to care for you today! We want to ensure we can follow your treatment plan and we strive to give you the best outcomes and experience possible. If you ever have a life threatening emergency and call 911 - for an ambulance (EMS)   Our providers can only care for you at:   [facility] or [facility]. Even if you have someone take you or you drive yourself we can only care for you in a [facility] facility. Our providers are not setup at the other healthcare locations! We are committed to providing you the best care possible. If you receive a survey after visiting one of our offices, please take time to share your experience concerning your physician office visit. These surveys are confidential and no health information about you is shared. We are eager to improve for you and we are counting on your feedback to help make that happen.

## 2023-12-22 NOTE — PROGRESS NOTES
Electrophysiology Consult Note      Reason for consultation:  atrial fibrillation and assess for watchman    Chief complaint : Here to discuss watchman and atrial fibrillation    Referring physician: Brie Weeks      Primary care physician: Fredo Gardner MD      History of Present Illness:     Patient is a 81-year-old male with a history of hypertension, diabetes, CVA, atrial fibrillation referred by Dr. Fab Odom for A-fib and Watchman assessment. Patient is on oxygen and with recent influenza pneumonia and having hypoxia so she is on oxygen right now. Patient had been off Coumadin because of subdural bleed because of frequent falls. Patient used to be a  until 5 years ago and very active but he hurt his quadriceps and then he stopped playing tennis. He still is trying to be active but with his recent pneumonia about he has a problems. Patient has not been off of Coumadin since August.  He has history of stroke. Almost lost his vision and he came back. Denies chest pain, palpitations, dizziness or syncope. He has shortness of breath and is on oxygen. He wants to discuss about Watchman. Pastmedical history:   Past Medical History:   Diagnosis Date    Diabetes mellitus ([address])     Hypertension     Kidney disease     patient sees Dr Marva Gutierrez with effusion 9/27/2023    Unspecified cerebral artery occlusion with cerebral infarction        Surgical history :   Past Surgical History:   Procedure Laterality Date    ACHILLES TENDON SURGERY Right     QUADRACEPS TENDON REPAIR Right     UPPER GASTROINTESTINAL ENDOSCOPY N/A 6/6/2023    EGD BIOPSY performed by Don Barroso MD at [facility] END[facility]COPY       Family history:   Family History   Problem Relation Age of Onset    Diabetes Mother     Cancer Mother     Heart Disease Father     Cancer Father        Social history :  reports that he has never smoked.  He has never used smokeless

## 2023-12-26 ENCOUNTER — OFFICE VISIT (OUTPATIENT)
Dept: PULMONOLOGY | Age: 73
End: 2023-12-26
Payer: MEDICARE

## 2023-12-26 ENCOUNTER — TELEPHONE (OUTPATIENT)
Dept: PULMONOLOGY | Age: 73
End: 2023-12-26

## 2023-12-26 VITALS
RESPIRATION RATE: 16 BRPM | BODY MASS INDEX: 32.43 KG/M2 | HEIGHT: 68 IN | HEART RATE: 104 BPM | DIASTOLIC BLOOD PRESSURE: 68 MMHG | OXYGEN SATURATION: 94 % | WEIGHT: 214 LBS | SYSTOLIC BLOOD PRESSURE: 110 MMHG

## 2023-12-26 DIAGNOSIS — R06.02 SOBOE (SHORTNESS OF BREATH ON EXERTION): ICD-10-CM

## 2023-12-26 DIAGNOSIS — R09.02 HYPOXIA: ICD-10-CM

## 2023-12-26 DIAGNOSIS — G47.33 OSA (OBSTRUCTIVE SLEEP APNEA): Primary | ICD-10-CM

## 2023-12-26 DIAGNOSIS — E66.9 OBESITY (BMI 30-39.9): ICD-10-CM

## 2023-12-26 DIAGNOSIS — G47.10 HYPERSOMNIA: ICD-10-CM

## 2023-12-26 PROCEDURE — 3078F DIAST BP <80 MM HG: CPT | Performed by: INTERNAL MEDICINE

## 2023-12-26 PROCEDURE — 99215 OFFICE O/P EST HI 40 MIN: CPT | Performed by: INTERNAL MEDICINE

## 2023-12-26 PROCEDURE — 1123F ACP DISCUSS/DSCN MKR DOCD: CPT | Performed by: INTERNAL MEDICINE

## 2023-12-26 PROCEDURE — 3074F SYST BP LT 130 MM HG: CPT | Performed by: INTERNAL MEDICINE

## 2023-12-26 NOTE — PROGRESS NOTES
Sasha Youssef Sr.  1950  Referring Provider: MEME Harrington - General     Subjective:     Chief Complaint   Patient presents with    Follow-Up from Hospital     Patient ESS & Neck completed- Needed for sleep lab to schedule sleep study       HPI  Marleni Rubalcava is a 68 y.o. male has come back as a follow up. He was in the last about 2 weeks ago for the SOB and Afib. He had small bilateral Pleural effusions. He had second hand smoking exposure. He is on 2 L.min of oxygen. He has little cough, little phlegm, no hemoptysis, no loss of weight, good appetite, SOBOE. Await CT chest in 6 months for the Lymphocytic predominant exudative pleural effusion. He is on Lasix. He had no PFT's or 6 MWT. He has symptoms of PRATEEK. He is sleepy and tired during the day time. He has no loss of weight.      Current Outpatient Medications   Medication Sig Dispense Refill    clopidogrel (PLAVIX) 75 MG tablet Take 1 tablet by mouth daily 30 tablet 5    dilTIAZem (CARDIZEM CD) 180 MG extended release capsule Take 1 capsule by mouth daily 30 capsule 3    tamsulosin (FLOMAX) 0.4 MG capsule Take 1 capsule by mouth daily 30 capsule 3    metOLazone (ZAROXOLYN) 2.5 MG tablet Take 1 tablet by mouth daily 90 tablet 3    furosemide (LASIX) 40 MG tablet Take 1 tablet by mouth 2 times daily 60 tablet 5    albuterol sulfate HFA (PROVENTIL;VENTOLIN;PROAIR) 108 (90 Base) MCG/ACT inhaler Inhale 2 puffs into the lungs every 4 hours as needed for Wheezing 1 each 11    umeclidinium-vilanterol (ANORO ELLIPTA) 62.5-25 MCG/ACT inhaler Inhale 1 puff into the lungs daily 1 each 5    montelukast (SINGULAIR) 10 MG tablet Take 1 tablet by mouth daily 30 tablet 5    levETIRAcetam (KEPPRA) 500 MG tablet       pantoprazole (PROTONIX) 40 MG tablet Take 1 tablet by mouth every morning (before breakfast) 30 tablet 5    lactulose (CHRONULAC) 10 GM/15ML solution Take 15 mLs by mouth 3 times daily 250 mL 1    spironolactone (ALDACTONE) 25 MG tablet TAKE ONE

## 2023-12-26 NOTE — TELEPHONE ENCOUNTER
----- Message from Susy Irizarry sent at 12/14/2023 11:01 AM EST -----  Regarding: missing items  Hello, we are missing all vitals, ess and neck on this patient.

## 2023-12-28 ENCOUNTER — TELEPHONE (OUTPATIENT)
Age: 73
End: 2023-12-28

## 2023-12-28 DIAGNOSIS — I48.91 ATRIAL FIBRILLATION, UNSPECIFIED TYPE (HCC): Primary | ICD-10-CM

## 2023-12-28 NOTE — TELEPHONE ENCOUNTER
Called the patient for Pre Watchman Education. Patient saw Dr Ancelmo Olson 12/22/2023 in office. My role as Watchman Coordinator explained. [employer] discussed, Brochure provided and [employer] shared on 12/22/2023. [employer] utilized and filled out. Faxed Shared decision and NIce Tool to Dr Laura Ngo. He had appointment and discussed with him on 12/22/2023. Explained to the patient:  Part of the FDA approval of the Watchman procedure in 2015 mandated that each procedure must participate in a national registry, this requires several follow up appointments and testing following the procedure. These expectations Include:      7-10 day follow up with the Nurse practitioner or Implanting Physician    45 day follow up lab work and VIRAJ to check positioning of the device. 6 month follow up telephone call     1 year follow up appointment and lab work (VIRAJ per Implanting physician discretion)    2  year follow up appointment and lab work . Discussed the importance of blood thinners as prescribed and that the patient cannot come off those blood thinners until discontinued by the implanting physician. Assessment/History of:    Nickel or metal allergy? [] Yes     [x] No    Contrast allergy? [] Yes     [x] No    Anesthesia issues? [] Yes     [x] No    Difficulty swallowing? [] Yes     [x] No    Do you have any procedures/surgeries planned that would interrupt Plavix and ASA for next 6 months? [] Yes     [x] No    History of Sleep Apnea? [x] Yes     [] No   (Being tested now)    Do you wear a CPAP? [] Yes     [] No      (Not yet)      Modified [place] Scale:    [x] 0: No symptoms at all  [] 1: No significant disability despite symptoms  [] 2: Slight disability   [] 3: Moderate disability  [] 4: Moderately severe disability  [] 5: Severe disability    [] Modified [place] Not Administered      All questions and concerns answered. Patient would like to proceed on 1/18/2023 at 1200.   Message

## 2023-12-29 ENCOUNTER — TELEPHONE (OUTPATIENT)
Age: 73
End: 2023-12-29

## 2023-12-29 NOTE — TELEPHONE ENCOUNTER
[facility] Procedure Scheduled:  Pretestin2024 at 1:30  VIRAJ: 1/15/2024 at 11:00 Arrival 10:00  Procedure:2024 at 12:00 Arrival 10:00  Patients wife aware of appointment dates and times as above. All questions verbalized were answered. Will follow.   Electronically signed by Reddy Pak RN on 2023 at 2:48 PM [address] [place] [address]

## 2024-01-03 ENCOUNTER — HOSPITAL ENCOUNTER (OUTPATIENT)
Age: 74
Discharge: HOME | End: 2024-01-03
Payer: MEDICARE

## 2024-01-03 DIAGNOSIS — Z01.810 PRE-OPERATIVE CARDIOVASCULAR EXAMINATION: ICD-10-CM

## 2024-01-03 DIAGNOSIS — I48.91 A-FIB (HCC): Primary | ICD-10-CM

## 2024-01-03 DIAGNOSIS — N18.31 STAGE 3A CHRONIC KIDNEY DISEASE (HCC): ICD-10-CM

## 2024-01-03 LAB
ALBUMIN SERPL-MCNC: 3.9 GM/DL (ref 3.4–5)
ALP BLD-CCNC: 124 IU/L (ref 40–128)
ALT SERPL-CCNC: 9 U/L (ref 10–40)
ANION GAP SERPL CALCULATED.3IONS-SCNC: 10 MMOL/L (ref 7–16)
AST SERPL-CCNC: 18 IU/L (ref 15–37)
BILIRUB SERPL-MCNC: 0.5 MG/DL (ref 0–1)
BUN SERPL-MCNC: 18 MG/DL (ref 6–23)
CALCIUM SERPL-MCNC: 9.7 MG/DL (ref 8.3–10.6)
CHLORIDE BLD-SCNC: 91 MMOL/L (ref 99–110)
CO2: 37 MMOL/L (ref 21–32)
CREAT SERPL-MCNC: 2 MG/DL (ref 0.9–1.3)
CREATININE URINE: 142.8 MG/DL (ref 39–259)
GFR SERPL CREATININE-BSD FRML MDRD: 35 ML/MIN/1.73M2
GLUCOSE SERPL-MCNC: 202 MG/DL (ref 70–99)
MAGNESIUM: 1.9 MG/DL (ref 1.8–2.4)
PHOSPHORUS: 2.8 MG/DL (ref 2.5–4.9)
POTASSIUM SERPL-SCNC: 4 MMOL/L (ref 3.5–5.1)
PROT/CREAT RATIO, UR: 1
SODIUM BLD-SCNC: 138 MMOL/L (ref 135–145)
TOTAL PROTEIN: 7.6 GM/DL (ref 6.4–8.2)
URINE TOTAL PROTEIN: 145.6 MG/DL

## 2024-01-03 PROCEDURE — 36415 COLL VENOUS BLD VENIPUNCTURE: CPT

## 2024-01-03 PROCEDURE — 83735 ASSAY OF MAGNESIUM: CPT

## 2024-01-03 PROCEDURE — 82570 ASSAY OF URINE CREATININE: CPT

## 2024-01-03 PROCEDURE — 84100 ASSAY OF PHOSPHORUS: CPT

## 2024-01-03 PROCEDURE — 80053 COMPREHEN METABOLIC PANEL: CPT

## 2024-01-03 PROCEDURE — 84156 ASSAY OF PROTEIN URINE: CPT

## 2024-01-11 ENCOUNTER — TELEPHONE (OUTPATIENT)
Dept: CARDIOLOGY CLINIC | Age: 74
End: 2024-01-11

## 2024-01-11 ENCOUNTER — NURSE ONLY (OUTPATIENT)
Dept: CARDIOLOGY CLINIC | Age: 74
End: 2024-01-11

## 2024-01-11 ENCOUNTER — HOSPITAL ENCOUNTER (OUTPATIENT)
Dept: GENERAL RADIOLOGY | Age: 74
Discharge: HOME | End: 2024-01-11
Payer: MEDICARE

## 2024-01-11 ENCOUNTER — HOSPITAL ENCOUNTER (OUTPATIENT)
Age: 74
Discharge: HOME | End: 2024-01-11
Payer: MEDICARE

## 2024-01-11 DIAGNOSIS — I48.91 ATRIAL FIBRILLATION, UNSPECIFIED TYPE (HCC): ICD-10-CM

## 2024-01-11 DIAGNOSIS — Z79.01 LONG TERM (CURRENT) USE OF ANTICOAGULANTS: ICD-10-CM

## 2024-01-11 DIAGNOSIS — Z01.810 PRE-OPERATIVE CARDIOVASCULAR EXAMINATION: ICD-10-CM

## 2024-01-11 DIAGNOSIS — I48.91 ATRIAL FIBRILLATION, UNSPECIFIED TYPE (HCC): Primary | ICD-10-CM

## 2024-01-11 LAB
ABO/RH: NORMAL
ANION GAP SERPL CALCULATED.3IONS-SCNC: 9 MMOL/L (ref 7–16)
ANTIBODY SCREEN: NEGATIVE
APTT: 35 SECONDS (ref 25.1–37.1)
BASOPHILS ABSOLUTE: 0 K/CU MM
BASOPHILS RELATIVE PERCENT: 0.4 % (ref 0–1)
BUN SERPL-MCNC: 21 MG/DL (ref 6–23)
CALCIUM SERPL-MCNC: 9.2 MG/DL (ref 8.3–10.6)
CHLORIDE BLD-SCNC: 94 MMOL/L (ref 99–110)
CO2: 37 MMOL/L (ref 21–32)
COMMENT: NORMAL
CREAT SERPL-MCNC: 2.1 MG/DL (ref 0.9–1.3)
DIFFERENTIAL TYPE: ABNORMAL
EOSINOPHILS ABSOLUTE: 0.1 K/CU MM
EOSINOPHILS RELATIVE PERCENT: 1.1 % (ref 0–3)
GFR SERPL CREATININE-BSD FRML MDRD: 33 ML/MIN/1.73M2
GLUCOSE SERPL-MCNC: 175 MG/DL (ref 70–99)
HCT VFR BLD CALC: 58.4 % (ref 42–52)
HEMOGLOBIN: 18.6 GM/DL (ref 13.5–18)
IMMATURE NEUTROPHIL %: 0.2 % (ref 0–0.43)
INR BLD: 1 INDEX
LYMPHOCYTES ABSOLUTE: 1.5 K/CU MM
LYMPHOCYTES RELATIVE PERCENT: 28 % (ref 24–44)
MAGNESIUM: 1.7 MG/DL (ref 1.8–2.4)
MCH RBC QN AUTO: 34.3 PG (ref 27–31)
MCHC RBC AUTO-ENTMCNC: 31.8 % (ref 32–36)
MCV RBC AUTO: 107.7 FL (ref 78–100)
MONOCYTES ABSOLUTE: 0.3 K/CU MM
MONOCYTES RELATIVE PERCENT: 5.6 % (ref 0–4)
NUCLEATED RBC %: 0 %
PDW BLD-RTO: 14.5 % (ref 11.7–14.9)
PHOSPHORUS: 2.6 MG/DL (ref 2.5–4.9)
PLATELET # BLD: ADEQUATE K/CU MM (ref 140–440)
PMV BLD AUTO: 10.5 FL (ref 7.5–11.1)
POTASSIUM SERPL-SCNC: 3.9 MMOL/L (ref 3.5–5.1)
PROTHROMBIN TIME: 13.4 SECONDS (ref 11.7–14.5)
RBC # BLD: 5.42 M/CU MM (ref 4.6–6.2)
SEGMENTED NEUTROPHILS ABSOLUTE COUNT: 3.5 K/CU MM
SEGMENTED NEUTROPHILS RELATIVE PERCENT: 64.7 % (ref 36–66)
SODIUM BLD-SCNC: 140 MMOL/L (ref 135–145)
TOTAL IMMATURE NEUTOROPHIL: 0.01 K/CU MM
TOTAL NUCLEATED RBC: 0 K/CU MM
WBC # BLD: 5.4 K/CU MM (ref 4–10.5)

## 2024-01-11 PROCEDURE — 86901 BLOOD TYPING SEROLOGIC RH(D): CPT

## 2024-01-11 PROCEDURE — 86900 BLOOD TYPING SEROLOGIC ABO: CPT

## 2024-01-11 PROCEDURE — 86850 RBC ANTIBODY SCREEN: CPT

## 2024-01-11 PROCEDURE — 83735 ASSAY OF MAGNESIUM: CPT

## 2024-01-11 PROCEDURE — 84100 ASSAY OF PHOSPHORUS: CPT

## 2024-01-11 PROCEDURE — 85025 COMPLETE CBC W/AUTO DIFF WBC: CPT

## 2024-01-11 PROCEDURE — 85610 PROTHROMBIN TIME: CPT

## 2024-01-11 PROCEDURE — 71046 X-RAY EXAM CHEST 2 VIEWS: CPT

## 2024-01-11 PROCEDURE — 85730 THROMBOPLASTIN TIME PARTIAL: CPT

## 2024-01-11 PROCEDURE — 80048 BASIC METABOLIC PNL TOTAL CA: CPT

## 2024-01-11 PROCEDURE — 36415 COLL VENOUS BLD VENIPUNCTURE: CPT

## 2024-01-11 NOTE — TELEPHONE ENCOUNTER
Francisca at [facility] called and patient is there to get labs and x ray done but unsure where to get COVID testing. Advised Francisca that patient was instructed to go to [facility] to have ALL testing done. This nurse attempted to call patient on his cell phone and no ans lm instructing patient needs to go to [facility] for covid testing.

## 2024-01-11 NOTE — PROGRESS NOTES
Patient here in office and educated on 1/11/2024, scheduled for VIRAJ on 1/15/2024 @ 1100, with arrival @ 1000, @ [facility]; risk explained; and consents signed. Also copy of orders given for COVID testing due 1/11/2024 at [facility]. Instruction given to patient to :  NPO after midnight the night before procedure; call hospital at [phone] to pre-register. May take rest of morning medications day of procedure except the following; Hold Cardizem and Lasix the morning of the procedure. Patient voiced understanding. Copies of consent & info scanned in chart.    Patient here in office and educated on 1/11/2024, scheduled for Watchman Implant on 1/18/2024 @ 1200, with arrival @ 1000, @ [facility]; risk explained; and consents signed. Also copy of orders given for labs, COVID and CXR due 1/11/2024 at [facility]. Instruction given to patient to :  NPO after midnight the night before procedure; call hospital at [phone] to pre-register. May take rest of morning medications day of procedure except the following; Hold ALL blood pressure medications morning of procedure.Hold diuretics morning of the procedure. CONTINUE taking Aspirin as directed. Patient voiced understanding. Copies of consent & info scanned in chart.

## 2024-01-12 ENCOUNTER — TELEPHONE (OUTPATIENT)
Dept: CARDIOLOGY CLINIC | Age: 74
End: 2024-01-12

## 2024-01-12 NOTE — TELEPHONE ENCOUNTER
Called patient and questions answered about VIRAJ/Watchman procedure scheduled next week.  Will follow.  Electronically signed by Jodi Gill RN on 1/12/2024 at 1:55 PM Watchman Care Coordinator

## 2024-01-15 ENCOUNTER — HOSPITAL ENCOUNTER (OUTPATIENT)
Dept: NON INVASIVE DIAGNOSTICS | Age: 74
Discharge: HOME | End: 2024-01-17
Attending: INTERNAL MEDICINE
Payer: MEDICARE

## 2024-01-15 VITALS
HEART RATE: 82 BPM | RESPIRATION RATE: 18 BRPM | OXYGEN SATURATION: 98 % | SYSTOLIC BLOOD PRESSURE: 113 MMHG | DIASTOLIC BLOOD PRESSURE: 54 MMHG

## 2024-01-15 DIAGNOSIS — I48.20 CHRONIC A-FIB (HCC): ICD-10-CM

## 2024-01-15 DIAGNOSIS — Z01.810 PRE-OPERATIVE CARDIOVASCULAR EXAMINATION: ICD-10-CM

## 2024-01-15 PROCEDURE — 7100000010 HC PHASE II RECOVERY - FIRST 15 MIN: Performed by: INTERNAL MEDICINE

## 2024-01-15 PROCEDURE — 7100000011 HC PHASE II RECOVERY - ADDTL 15 MIN: Performed by: INTERNAL MEDICINE

## 2024-01-15 PROCEDURE — 6360000002 HC RX W HCPCS: Performed by: INTERNAL MEDICINE

## 2024-01-15 PROCEDURE — 93325 DOPPLER ECHO COLOR FLOW MAPG: CPT

## 2024-01-15 PROCEDURE — 99153 MOD SED SAME PHYS/QHP EA: CPT | Performed by: INTERNAL MEDICINE

## 2024-01-15 PROCEDURE — 99152 MOD SED SAME PHYS/QHP 5/>YRS: CPT | Performed by: INTERNAL MEDICINE

## 2024-01-15 PROCEDURE — 6370000000 HC RX 637 (ALT 250 FOR IP): Performed by: INTERNAL MEDICINE

## 2024-01-15 PROCEDURE — 93312 ECHO TRANSESOPHAGEAL: CPT

## 2024-01-15 RX ORDER — FENTANYL CITRATE 50 UG/ML
INJECTION, SOLUTION INTRAMUSCULAR; INTRAVENOUS PRN
Status: COMPLETED | OUTPATIENT
Start: 2024-01-15 | End: 2024-01-15

## 2024-01-15 RX ORDER — LIDOCAINE HYDROCHLORIDE 20 MG/ML
SOLUTION OROPHARYNGEAL PRN
Status: COMPLETED | OUTPATIENT
Start: 2024-01-15 | End: 2024-01-15

## 2024-01-15 RX ORDER — MIDAZOLAM HYDROCHLORIDE 1 MG/ML
INJECTION INTRAMUSCULAR; INTRAVENOUS PRN
Status: COMPLETED | OUTPATIENT
Start: 2024-01-15 | End: 2024-01-15

## 2024-01-15 RX ADMIN — MIDAZOLAM 0.5 MG: 1 INJECTION INTRAMUSCULAR; INTRAVENOUS at 11:22

## 2024-01-15 RX ADMIN — MIDAZOLAM 0.5 MG: 1 INJECTION INTRAMUSCULAR; INTRAVENOUS at 11:17

## 2024-01-15 RX ADMIN — FENTANYL CITRATE 25 MCG: 50 INJECTION, SOLUTION INTRAMUSCULAR; INTRAVENOUS at 11:18

## 2024-01-15 RX ADMIN — LIDOCAINE HYDROCHLORIDE 15 ML: 20 SOLUTION ORAL; TOPICAL at 11:13

## 2024-01-15 RX ADMIN — MIDAZOLAM 1 MG: 1 INJECTION INTRAMUSCULAR; INTRAVENOUS at 11:15

## 2024-01-15 RX ADMIN — FENTANYL CITRATE 25 MCG: 50 INJECTION, SOLUTION INTRAMUSCULAR; INTRAVENOUS at 11:16

## 2024-01-15 ASSESSMENT — ENCOUNTER SYMPTOMS
BACK PAIN: 0
DIARRHEA: 0
PHOTOPHOBIA: 0
BLOOD IN STOOL: 0
SHORTNESS OF BREATH: 1
VOMITING: 0
COUGH: 0
EYE PAIN: 0
WHEEZING: 0
CHEST TIGHTNESS: 0
NAUSEA: 0
COLOR CHANGE: 0
CONSTIPATION: 0
ABDOMINAL PAIN: 0

## 2024-01-15 NOTE — H&P
Electrophysiology H&p Note      Reason for consultation:  atrial fibrillation and assess for watchman    Chief complaint : here for prewtachman VIRAJ    Referring physician:       Primary care physician: Adam Mcgill MD      History of Present Illness:     Today visit (01/15/24)    Patient here for prewatchman VIRAJ. No change in H&P noted from previous clinic visit.    Previous visit:     Patient is a 73-year-old male with a history of hypertension, diabetes, CVA, atrial fibrillation referred by Dr. Barajas for A-fib and Watchman assessment.  Patient is on oxygen and with recent influenza pneumonia and having hypoxia so she is on oxygen right now.      Patient had been off Coumadin because of subdural bleed because of frequent falls.      Patient used to be a  until 5 years ago and very active but he hurt his quadriceps and then he stopped playing tennis.  He still is trying to be active but with his recent pneumonia about he has a problems.  Patient has not been off of Coumadin since August.  He has history of stroke.  Almost lost his vision and he came back.    Denies chest pain, palpitations, dizziness or syncope.  He has shortness of breath and is on oxygen.    He wants to discuss about Watchman.    Pastmedical history:   Past Medical History:   Diagnosis Date    Diabetes mellitus (HCC)     Hypertension     Kidney disease     patient sees Dr Sykes    Pleurisy with effusion 9/27/2023    Unspecified cerebral artery occlusion with cerebral infarction        Surgical history :   Past Surgical History:   Procedure Laterality Date    ACHILLES TENDON SURGERY Right     QUADRACEPS TENDON REPAIR Right     UPPER GASTROINTESTINAL ENDOSCOPY N/A 6/6/2023    EGD BIOPSY performed by Ilia Canseco MD at [facility] ENDOSCOPY       Family history:   Family History   Problem Relation Age of Onset    Diabetes Mother     Cancer Mother     Heart Disease Father

## 2024-01-16 ENCOUNTER — TELEPHONE (OUTPATIENT)
Age: 74
End: 2024-01-16

## 2024-01-16 ENCOUNTER — TELEPHONE (OUTPATIENT)
Dept: CARDIOLOGY CLINIC | Age: 74
End: 2024-01-16

## 2024-01-16 NOTE — TELEPHONE ENCOUNTER
Day 1 call done.   Patient states he is doing well. .   Groin has no bruising or hematoma present.   Patient is taking anticoagulants as prescribed.   Denies c/o or needs.  All questions answered.   Will call me if questions arise.   Advised to call back if any changes or questions. Follow up appointments made. Patient stated understanding.

## 2024-01-16 NOTE — TELEPHONE ENCOUNTER
Called [facility] per wifes request. They handle all medications for the patient.  VM left for call back. Explained that patient would need to take ASA and Plavix the day of the procedure before coming to the hospital.  Will await call back.  Will follow.  Electronically signed by Jodi Gill RN on 1/16/2024 at 11:11 AM [facility] Coordinator

## 2024-01-17 LAB
ABO/RH: NORMAL
ANTIBODY SCREEN: NEGATIVE
COMMENT: NORMAL
COMPONENT: NORMAL
CROSSMATCH RESULT: NORMAL
STATUS: NORMAL
TRANSFUSION STATUS: NORMAL
UNIT DIVISION: 0
UNIT NUMBER: NORMAL

## 2024-01-18 ENCOUNTER — HOSPITAL ENCOUNTER (INPATIENT)
Age: 74
LOS: 1 days | Discharge: HOME | DRG: 274 | End: 2024-01-18
Attending: INTERNAL MEDICINE | Admitting: INTERNAL MEDICINE
Payer: MEDICARE

## 2024-01-18 ENCOUNTER — APPOINTMENT (OUTPATIENT)
Dept: NON INVASIVE DIAGNOSTICS | Age: 74
DRG: 274 | End: 2024-01-18
Attending: INTERNAL MEDICINE
Payer: MEDICARE

## 2024-01-18 ENCOUNTER — ANESTHESIA EVENT (OUTPATIENT)
Age: 74
DRG: 274 | End: 2024-01-18
Payer: MEDICARE

## 2024-01-18 ENCOUNTER — ANESTHESIA (OUTPATIENT)
Age: 74
DRG: 274 | End: 2024-01-18
Payer: MEDICARE

## 2024-01-18 VITALS
BODY MASS INDEX: 32.28 KG/M2 | HEART RATE: 95 BPM | OXYGEN SATURATION: 96 % | WEIGHT: 213 LBS | TEMPERATURE: 97.6 F | SYSTOLIC BLOOD PRESSURE: 141 MMHG | HEIGHT: 68 IN | RESPIRATION RATE: 20 BRPM | DIASTOLIC BLOOD PRESSURE: 82 MMHG

## 2024-01-18 DIAGNOSIS — I48.91 ATRIAL FIBRILLATION, UNSPECIFIED TYPE (HCC): ICD-10-CM

## 2024-01-18 DIAGNOSIS — I48.91 A-FIB (HCC): ICD-10-CM

## 2024-01-18 PROBLEM — Z95.818 PRESENCE OF WATCHMAN LEFT ATRIAL APPENDAGE CLOSURE DEVICE: Status: ACTIVE | Noted: 2024-01-18

## 2024-01-18 LAB
ECHO BSA: 2.15 M2
ECHO BSA: 2.15 M2
GLUCOSE BLD-MCNC: 144 MG/DL (ref 70–99)
MAGNESIUM: 1.9 MG/DL (ref 1.8–2.4)
SARS-COV-2 RDRP RESP QL NAA+PROBE: NOT DETECTED
SOURCE: NORMAL

## 2024-01-18 PROCEDURE — 33340 PERQ CLSR TCAT L ATR APNDGE: CPT | Performed by: INTERNAL MEDICINE

## 2024-01-18 PROCEDURE — 2500000003 HC RX 250 WO HCPCS

## 2024-01-18 PROCEDURE — 7100000010 HC PHASE II RECOVERY - FIRST 15 MIN: Performed by: INTERNAL MEDICINE

## 2024-01-18 PROCEDURE — 99153 MOD SED SAME PHYS/QHP EA: CPT | Performed by: INTERNAL MEDICINE

## 2024-01-18 PROCEDURE — 7100000001 HC PACU RECOVERY - ADDTL 15 MIN: Performed by: INTERNAL MEDICINE

## 2024-01-18 PROCEDURE — 87635 SARS-COV-2 COVID-19 AMP PRB: CPT

## 2024-01-18 PROCEDURE — 99152 MOD SED SAME PHYS/QHP 5/>YRS: CPT | Performed by: INTERNAL MEDICINE

## 2024-01-18 PROCEDURE — 02L73DK OCCLUSION OF LEFT ATRIAL APPENDAGE WITH INTRALUMINAL DEVICE, PERCUTANEOUS APPROACH: ICD-10-PCS | Performed by: INTERNAL MEDICINE

## 2024-01-18 PROCEDURE — 2500000003 HC RX 250 WO HCPCS: Performed by: INTERNAL MEDICINE

## 2024-01-18 PROCEDURE — 6360000002 HC RX W HCPCS

## 2024-01-18 PROCEDURE — C1760 CLOSURE DEV, VASC: HCPCS | Performed by: INTERNAL MEDICINE

## 2024-01-18 PROCEDURE — 2500000003 HC RX 250 WO HCPCS: Performed by: NURSE ANESTHETIST, CERTIFIED REGISTERED

## 2024-01-18 PROCEDURE — 7100000000 HC PACU RECOVERY - FIRST 15 MIN: Performed by: INTERNAL MEDICINE

## 2024-01-18 PROCEDURE — 2709999900 HC NON-CHARGEABLE SUPPLY: Performed by: INTERNAL MEDICINE

## 2024-01-18 PROCEDURE — C1894 INTRO/SHEATH, NON-LASER: HCPCS | Performed by: INTERNAL MEDICINE

## 2024-01-18 PROCEDURE — 82962 GLUCOSE BLOOD TEST: CPT

## 2024-01-18 PROCEDURE — 1200000000 HC SEMI PRIVATE

## 2024-01-18 PROCEDURE — 2720000010 HC SURG SUPPLY STERILE: Performed by: INTERNAL MEDICINE

## 2024-01-18 PROCEDURE — 2580000003 HC RX 258

## 2024-01-18 PROCEDURE — 83735 ASSAY OF MAGNESIUM: CPT

## 2024-01-18 PROCEDURE — 6360000002 HC RX W HCPCS: Performed by: NURSE ANESTHETIST, CERTIFIED REGISTERED

## 2024-01-18 PROCEDURE — 2580000003 HC RX 258: Performed by: NURSE ANESTHETIST, CERTIFIED REGISTERED

## 2024-01-18 PROCEDURE — 93325 DOPPLER ECHO COLOR FLOW MAPG: CPT

## 2024-01-18 PROCEDURE — C1769 GUIDE WIRE: HCPCS | Performed by: INTERNAL MEDICINE

## 2024-01-18 PROCEDURE — 6360000004 HC RX CONTRAST MEDICATION

## 2024-01-18 PROCEDURE — 7100000011 HC PHASE II RECOVERY - ADDTL 15 MIN: Performed by: INTERNAL MEDICINE

## 2024-01-18 PROCEDURE — C1889 IMPLANT/INSERT DEVICE, NOC: HCPCS | Performed by: INTERNAL MEDICINE

## 2024-01-18 PROCEDURE — B24BZZ4 ULTRASONOGRAPHY OF HEART WITH AORTA, TRANSESOPHAGEAL: ICD-10-PCS | Performed by: INTERNAL MEDICINE

## 2024-01-18 PROCEDURE — 3700000001 HC ADD 15 MINUTES (ANESTHESIA): Performed by: INTERNAL MEDICINE

## 2024-01-18 PROCEDURE — 99238 HOSP IP/OBS DSCHRG MGMT 30/<: CPT | Performed by: NURSE PRACTITIONER

## 2024-01-18 PROCEDURE — 3700000000 HC ANESTHESIA ATTENDED CARE: Performed by: INTERNAL MEDICINE

## 2024-01-18 DEVICE — LEFT ATRIAL APPENDAGE CLOSURE DEVICE WITH DELIVERY SYSTEM
Type: IMPLANTABLE DEVICE | Site: HEART | Status: FUNCTIONAL
Brand: WATCHMAN FLX™

## 2024-01-18 RX ORDER — FENTANYL CITRATE 50 UG/ML
50 INJECTION, SOLUTION INTRAMUSCULAR; INTRAVENOUS EVERY 5 MIN PRN
Status: DISCONTINUED | OUTPATIENT
Start: 2024-01-18 | End: 2024-01-18 | Stop reason: HOSPADM

## 2024-01-18 RX ORDER — SODIUM CHLORIDE 0.9 % (FLUSH) 0.9 %
5-40 SYRINGE (ML) INJECTION EVERY 12 HOURS SCHEDULED
Status: DISCONTINUED | OUTPATIENT
Start: 2024-01-18 | End: 2024-01-18 | Stop reason: HOSPADM

## 2024-01-18 RX ORDER — DROPERIDOL 2.5 MG/ML
0.62 INJECTION, SOLUTION INTRAMUSCULAR; INTRAVENOUS
Status: DISCONTINUED | OUTPATIENT
Start: 2024-01-18 | End: 2024-01-18 | Stop reason: HOSPADM

## 2024-01-18 RX ORDER — SODIUM CHLORIDE 0.9 % (FLUSH) 0.9 %
5-40 SYRINGE (ML) INJECTION PRN
Status: DISCONTINUED | OUTPATIENT
Start: 2024-01-18 | End: 2024-01-18 | Stop reason: HOSPADM

## 2024-01-18 RX ORDER — OXYCODONE HYDROCHLORIDE 5 MG/1
5 TABLET ORAL
Status: DISCONTINUED | OUTPATIENT
Start: 2024-01-18 | End: 2024-01-18 | Stop reason: HOSPADM

## 2024-01-18 RX ORDER — FENTANYL CITRATE 50 UG/ML
INJECTION, SOLUTION INTRAMUSCULAR; INTRAVENOUS PRN
Status: DISCONTINUED | OUTPATIENT
Start: 2024-01-18 | End: 2024-01-19 | Stop reason: SDUPTHER

## 2024-01-18 RX ORDER — HEPARIN SODIUM 1000 [USP'U]/ML
INJECTION, SOLUTION INTRAVENOUS; SUBCUTANEOUS PRN
Status: DISCONTINUED | OUTPATIENT
Start: 2024-01-18 | End: 2024-01-19 | Stop reason: SDUPTHER

## 2024-01-18 RX ORDER — SODIUM CHLORIDE 9 MG/ML
INJECTION, SOLUTION INTRAVENOUS PRN
Status: DISCONTINUED | OUTPATIENT
Start: 2024-01-18 | End: 2024-01-18 | Stop reason: HOSPADM

## 2024-01-18 RX ORDER — ACETAMINOPHEN 325 MG/1
650 TABLET ORAL EVERY 4 HOURS PRN
Status: DISCONTINUED | OUTPATIENT
Start: 2024-01-18 | End: 2024-01-18 | Stop reason: HOSPADM

## 2024-01-18 RX ORDER — LABETALOL HYDROCHLORIDE 5 MG/ML
10 INJECTION, SOLUTION INTRAVENOUS
Status: DISCONTINUED | OUTPATIENT
Start: 2024-01-18 | End: 2024-01-18 | Stop reason: HOSPADM

## 2024-01-18 RX ORDER — ONDANSETRON 2 MG/ML
4 INJECTION INTRAMUSCULAR; INTRAVENOUS
Status: DISCONTINUED | OUTPATIENT
Start: 2024-01-18 | End: 2024-01-18 | Stop reason: HOSPADM

## 2024-01-18 RX ORDER — DEXAMETHASONE SODIUM PHOSPHATE 4 MG/ML
INJECTION, SOLUTION INTRA-ARTICULAR; INTRALESIONAL; INTRAMUSCULAR; INTRAVENOUS; SOFT TISSUE PRN
Status: DISCONTINUED | OUTPATIENT
Start: 2024-01-18 | End: 2024-01-19 | Stop reason: SDUPTHER

## 2024-01-18 RX ORDER — HYDRALAZINE HYDROCHLORIDE 20 MG/ML
10 INJECTION INTRAMUSCULAR; INTRAVENOUS
Status: DISCONTINUED | OUTPATIENT
Start: 2024-01-18 | End: 2024-01-18 | Stop reason: HOSPADM

## 2024-01-18 RX ORDER — ROCURONIUM BROMIDE 10 MG/ML
INJECTION, SOLUTION INTRAVENOUS PRN
Status: DISCONTINUED | OUTPATIENT
Start: 2024-01-18 | End: 2024-01-19 | Stop reason: SDUPTHER

## 2024-01-18 RX ORDER — LIDOCAINE HYDROCHLORIDE 20 MG/ML
INJECTION, SOLUTION EPIDURAL; INFILTRATION; INTRACAUDAL; PERINEURAL PRN
Status: DISCONTINUED | OUTPATIENT
Start: 2024-01-18 | End: 2024-01-19 | Stop reason: SDUPTHER

## 2024-01-18 RX ORDER — ONDANSETRON 2 MG/ML
INJECTION INTRAMUSCULAR; INTRAVENOUS PRN
Status: DISCONTINUED | OUTPATIENT
Start: 2024-01-18 | End: 2024-01-19 | Stop reason: SDUPTHER

## 2024-01-18 RX ORDER — PROTAMINE SULFATE 10 MG/ML
INJECTION, SOLUTION INTRAVENOUS PRN
Status: DISCONTINUED | OUTPATIENT
Start: 2024-01-18 | End: 2024-01-19 | Stop reason: SDUPTHER

## 2024-01-18 RX ORDER — SODIUM CHLORIDE 9 MG/ML
INJECTION, SOLUTION INTRAVENOUS CONTINUOUS PRN
Status: DISCONTINUED | OUTPATIENT
Start: 2024-01-18 | End: 2024-01-19 | Stop reason: SDUPTHER

## 2024-01-18 RX ORDER — PROPOFOL 10 MG/ML
INJECTION, EMULSION INTRAVENOUS PRN
Status: DISCONTINUED | OUTPATIENT
Start: 2024-01-18 | End: 2024-01-19 | Stop reason: SDUPTHER

## 2024-01-18 RX ORDER — VANCOMYCIN HYDROCHLORIDE 1 G/20ML
INJECTION, POWDER, LYOPHILIZED, FOR SOLUTION INTRAVENOUS PRN
Status: DISCONTINUED | OUTPATIENT
Start: 2024-01-18 | End: 2024-01-19 | Stop reason: SDUPTHER

## 2024-01-18 RX ADMIN — VANCOMYCIN HYDROCHLORIDE 1500 MG: 1 INJECTION, POWDER, LYOPHILIZED, FOR SOLUTION INTRAVENOUS at 12:19

## 2024-01-18 RX ADMIN — HEPARIN SODIUM 12000 UNITS: 1000 INJECTION INTRAVENOUS; SUBCUTANEOUS at 12:24

## 2024-01-18 RX ADMIN — PROTAMINE SULFATE 30 MG: 10 INJECTION, SOLUTION INTRAVENOUS at 12:44

## 2024-01-18 RX ADMIN — SUGAMMADEX 200 MG: 100 INJECTION, SOLUTION INTRAVENOUS at 12:51

## 2024-01-18 RX ADMIN — HEPARIN SODIUM 1000 UNITS: 1000 INJECTION INTRAVENOUS; SUBCUTANEOUS at 12:36

## 2024-01-18 RX ADMIN — ONDANSETRON 4 MG: 2 INJECTION INTRAMUSCULAR; INTRAVENOUS at 12:44

## 2024-01-18 RX ADMIN — ROCURONIUM BROMIDE 50 MG: 10 INJECTION, SOLUTION INTRAVENOUS at 12:03

## 2024-01-18 RX ADMIN — DEXAMETHASONE SODIUM PHOSPHATE 4 MG: 4 INJECTION, SOLUTION INTRAMUSCULAR; INTRAVENOUS at 12:10

## 2024-01-18 RX ADMIN — PHENYLEPHRINE HYDROCHLORIDE 100 MCG: 10 INJECTION INTRAVENOUS at 12:34

## 2024-01-18 RX ADMIN — PHENYLEPHRINE HYDROCHLORIDE 50 MCG: 10 INJECTION INTRAVENOUS at 12:21

## 2024-01-18 RX ADMIN — FENTANYL CITRATE 100 MCG: 50 INJECTION, SOLUTION INTRAMUSCULAR; INTRAVENOUS at 12:03

## 2024-01-18 RX ADMIN — PHENYLEPHRINE HYDROCHLORIDE 100 MCG: 10 INJECTION INTRAVENOUS at 12:44

## 2024-01-18 RX ADMIN — LIDOCAINE HYDROCHLORIDE 50 MG: 20 INJECTION, SOLUTION EPIDURAL; INFILTRATION; INTRACAUDAL; PERINEURAL at 12:03

## 2024-01-18 RX ADMIN — PROPOFOL 60 MG: 10 INJECTION, EMULSION INTRAVENOUS at 12:03

## 2024-01-18 RX ADMIN — PHENYLEPHRINE HYDROCHLORIDE 50 MCG: 10 INJECTION INTRAVENOUS at 12:10

## 2024-01-18 RX ADMIN — PHENYLEPHRINE HYDROCHLORIDE 100 MCG: 10 INJECTION INTRAVENOUS at 12:31

## 2024-01-18 RX ADMIN — SODIUM CHLORIDE: 900 INJECTION INTRAVENOUS at 12:26

## 2024-01-18 RX ADMIN — PHENYLEPHRINE HYDROCHLORIDE 100 MCG: 10 INJECTION INTRAVENOUS at 12:18

## 2024-01-18 RX ADMIN — ROCURONIUM BROMIDE 10 MG: 10 INJECTION, SOLUTION INTRAVENOUS at 12:25

## 2024-01-18 ASSESSMENT — ENCOUNTER SYMPTOMS
CONSTIPATION: 0
COUGH: 0
NAUSEA: 0
COLOR CHANGE: 0
DIARRHEA: 0
BACK PAIN: 0
SHORTNESS OF BREATH: 1
VOMITING: 0
CHEST TIGHTNESS: 0
ABDOMINAL PAIN: 0
EYE PAIN: 0
WHEEZING: 0
PHOTOPHOBIA: 0
BLOOD IN STOOL: 0

## 2024-01-18 ASSESSMENT — PAIN SCALES - GENERAL
PAINLEVEL_OUTOF10: 0

## 2024-01-18 NOTE — PROGRESS NOTES
Patient dressed self. Taken to fron entrance per WC. Friend here for transport. Patient has DC instructions and belongings. Pt on port O2 from home. Denies any concerns at this time. Discharged to home

## 2024-01-18 NOTE — FLOWSHEET NOTE
01/18/24 1502   Puncture Site Assessment 1   Location Femoral - right   Site Assessment No redness, drainage, swelling or hematoma   Hemostasis Intervention Figure 8 stitch     Figure 8 sutures removed at this time per Lynnette RN as per orders. Site free of bleeding/hematoma. Site dressed with 4x4 secured with tegaderm

## 2024-01-18 NOTE — ANESTHESIA PRE PROCEDURE
Department of Anesthesiology  Preprocedure Note       Name:  Balta Meier Sr.   Age:  73 y.o.  :  1950                                          MRN:  [id number]         Date:  2024      Surgeon: Surgeon(s):  Gray Pascual MD    Procedure: Procedure(s):  Watchman ken closure device    Medications prior to admission:   Prior to Admission medications    Medication Sig Start Date End Date Taking? Authorizing Provider   clopidogrel (PLAVIX) 75 MG tablet Take 1 tablet by mouth daily 23   Gray Pascual MD   dilTIAZem (CARDIZEM CD) 180 MG extended release capsule Take 1 capsule by mouth daily 10/8/23   Sindy Strong MD   tamsulosin (FLOMAX) 0.4 MG capsule Take 1 capsule by mouth daily 10/9/23   Sindy Strong MD   metOLazone (ZAROXOLYN) 2.5 MG tablet Take 1 tablet by mouth daily  Patient not taking: Reported on 1/15/2024 10/3/23   Teo Sykes MD   furosemide (LASIX) 40 MG tablet Take 1 tablet by mouth 2 times daily 10/3/23   Teo Sykes MD   albuterol sulfate HFA (PROVENTIL;VENTOLIN;PROAIR) 108 (90 Base) MCG/ACT inhaler Inhale 2 puffs into the lungs every 4 hours as needed for Wheezing 23   Tal Jeter MD   umeclidinium-vilanterol (ANORO ELLIPTA) 62.5-25 MCG/ACT inhaler Inhale 1 puff into the lungs daily 23   Katie Read APRN - CNP   montelukast (SINGULAIR) 10 MG tablet Take 1 tablet by mouth daily  Patient not taking: Reported on 2024   Katie Read APRN - CNP   levETIRAcetam (KEPPRA) 500 MG tablet  23   ProviderAwilda MD   pantoprazole (PROTONIX) 40 MG tablet Take 1 tablet by mouth every morning (before breakfast) 23   Ilia Canseco MD   lactulose (CHRONULAC) 10 GM/15ML solution Take 15 mLs by mouth 3 times daily 23   Teo Sykes MD   spironolactone (ALDACTONE) 25 MG tablet TAKE ONE TABLET BY MOUTH TWICE A DAY 22   Teo Sykes MD   Multiple Vitamins-Minerals (PRESERVISION AREDS PO) Take 2 tablets by mouth

## 2024-01-18 NOTE — PLAN OF CARE
Received from PACU.  Monitor and alarms on. Call Light in reach. Right groin assessed with no bleeding or hematoma noted and figure 8 sutures remain in place. Lynnette Cheema RN 1/18/2024     The patient is a 9y8m year old Female complaining of chest discomfort.

## 2024-01-18 NOTE — ANESTHESIA PRE PROCEDURE
Department of Anesthesiology  Preprocedure Note       Name:  Balta Meier Sr.   Age:  73 y.o.  :  1950                                          MRN:  [id number]         Date:  2024      Surgeon: Surgeon(s):  Gray Pascual MD    Procedure: Procedure(s):  Watchman ken closure device    Medications prior to admission:   Prior to Admission medications    Medication Sig Start Date End Date Taking? Authorizing Provider   clopidogrel (PLAVIX) 75 MG tablet Take 1 tablet by mouth daily 23   Gray Pascual MD   dilTIAZem (CARDIZEM CD) 180 MG extended release capsule Take 1 capsule by mouth daily 10/8/23   Sindy Strong MD   tamsulosin (FLOMAX) 0.4 MG capsule Take 1 capsule by mouth daily 10/9/23   Sindy Strong MD   metOLazone (ZAROXOLYN) 2.5 MG tablet Take 1 tablet by mouth daily  Patient not taking: Reported on 1/15/2024 10/3/23   Teo Sykes MD   furosemide (LASIX) 40 MG tablet Take 1 tablet by mouth 2 times daily 10/3/23   Teo Sykes MD   albuterol sulfate HFA (PROVENTIL;VENTOLIN;PROAIR) 108 (90 Base) MCG/ACT inhaler Inhale 2 puffs into the lungs every 4 hours as needed for Wheezing 23   Tal Jeter MD   umeclidinium-vilanterol (ANORO ELLIPTA) 62.5-25 MCG/ACT inhaler Inhale 1 puff into the lungs daily 23   Katie Read APRN - CNP   montelukast (SINGULAIR) 10 MG tablet Take 1 tablet by mouth daily  Patient not taking: Reported on 2024   Katie Read APRN - CNP   levETIRAcetam (KEPPRA) 500 MG tablet  23   ProviderAwilda MD   pantoprazole (PROTONIX) 40 MG tablet Take 1 tablet by mouth every morning (before breakfast) 23   Ilia Canseco MD   lactulose (CHRONULAC) 10 GM/15ML solution Take 15 mLs by mouth 3 times daily 23   Teo Sykes MD   spironolactone (ALDACTONE) 25 MG tablet TAKE ONE TABLET BY MOUTH TWICE A DAY 22   Teo Sykes MD   Multiple Vitamins-Minerals (PRESERVISION AREDS PO) Take 2 tablets by mouth  113/54   01/05/24 122/62       NPO Status: Time of last liquid consumption: 0001                        Time of last solid consumption: 0001                        Date of last liquid consumption: 01/18/24                        Date of last solid food consumption: 01/18/24    BMI:   Wt Readings from Last 3 Encounters:   01/18/24 96.6 kg (213 lb)   01/05/24 99.9 kg (220 lb 3.2 oz)   12/26/23 97.1 kg (214 lb)     Body mass index is 32.39 kg/m².    CBC:   Lab Results   Component Value Date/Time    WBC 5.4 01/11/2024 03:20 PM    RBC 5.42 01/11/2024 03:20 PM    HGB 18.6 01/11/2024 03:20 PM    HCT 58.4 01/11/2024 03:20 PM    .7 01/11/2024 03:20 PM    RDW 14.5 01/11/2024 03:20 PM    PLT ADEQUATE 01/11/2024 03:20 PM       CMP:   Lab Results   Component Value Date/Time     01/11/2024 03:20 PM    K 3.9 01/11/2024 03:20 PM    CL 94 01/11/2024 03:20 PM    CO2 37 01/11/2024 03:20 PM    BUN 21 01/11/2024 03:20 PM    CREATININE 2.1 01/11/2024 03:20 PM    GFRAA >60 07/18/2022 07:26 AM    LABGLOM 33 01/11/2024 03:20 PM    GLUCOSE 175 01/11/2024 03:20 PM    PROT 7.6 01/03/2024 11:09 AM    PROT 6.9 12/28/2011 09:20 AM    CALCIUM 9.2 01/11/2024 03:20 PM    BILITOT 0.5 01/03/2024 11:09 AM    ALKPHOS 124 01/03/2024 11:09 AM    AST 18 01/03/2024 11:09 AM    ALT 9 01/03/2024 11:09 AM       POC Tests: No results for input(s): \"POCGLU\", \"POCNA\", \"POCK\", \"POCCL\", \"POCBUN\", \"POCHEMO\", \"POCHCT\" in the last 72 hours.    Coags:   Lab Results   Component Value Date/Time    PROTIME 13.4 01/11/2024 03:20 PM    PROTIME 34.7 12/28/2011 09:20 AM    INR 1.0 01/11/2024 03:20 PM    APTT 35.0 01/11/2024 03:20 PM       HCG (If Applicable): No results found for: \"PREGTESTUR\", \"PREGSERUM\", \"HCG\", \"HCGQUANT\"     ABGs:   Lab Results   Component Value Date/Time    PO2ART 55.6 03/13/2021 01:43 PM    QOW9UPD 49.7 03/13/2021 01:43 PM    GWP9UWE 26.1 03/13/2021 01:43 PM        Type & Screen (If Applicable):  No results found for: \"LABABO\",

## 2024-01-18 NOTE — PROGRESS NOTES
1308- pt. Arrived to pacu via cart from ep lab. Pt. Is drowsy from anesthesia but responds to verbal stimuli and able to follow commands. Received report from GOOD Olivares and NIR Lopez via telephone. Pt. Dressing to right groin is clean dry and intact. Pt. Groin site closed with per close and figure 8 sutures. Pt. Coughing a lot. Educated pt. To hold pressure when coughing. Pt. Is wearing a simple mask upon arrival. SR on the monitor.   1351- pt. Is aox4. Pt. Denies pain or n/v. Pt. Has otlerated ice chips. He is on 2L via NC. SR on the monitor. Pt. Groin site remains clean dry and intact. Site soft. Pt. Pulses bilaterally  post tib and pedal are +1. Pt. Updated on plan of care and denies any other questions or concerns.   1351- gave bedside report to NIR Church. Right groin site checked. Site remains clean dry and intact. Site is soft.

## 2024-01-18 NOTE — PROGRESS NOTES
R groin without drainage or hematoma. Discharge instructions discussed with patient and wife. Copies given. Denies any concerns.

## 2024-01-18 NOTE — FLOWSHEET NOTE
01/18/24 1530   Vitals   Pulse 95   BP (!) 141/82   MAP (Calculated) 102   MAP (mmHg) 100     R groin intact without ooze or hematoma. HOB up 45 degrees. Call light in hand

## 2024-01-18 NOTE — PROGRESS NOTES
Ambulated to bathroom without difficulty. Back to bed. R groin dressing intact without drainage or hematoma.

## 2024-01-18 NOTE — DISCHARGE INSTRUCTIONS
Discharge Home Instuctions  Name:Balta Meier Sr.  :1950    Your instructions:    Shower only for the first 5 days, NO TUB BATHS.      Wash procedure site with mild soap, rinse and pat dry.  Do not rub over the procedure site for two (2) days.  Remove dressing and replace with a band-aid in 2 days.    Site observations-Observe the area for bleeding or hematoma (lump under the skin caused by bleeding.)      A.  Painless bruising is normal.  B.  If a firmness/swelling seems to be increasing or there is bright red bleeding from site       (hold pressure over procedure site) and  CALL 911 IMMEDIATELY.    What to do after you leave the hospital:    Recommended activity: no lifting, Driving, or Strenuous exercise for 3 days.  DO NOT lift more than 10lbs.    For your procedure you may have been given a sedative to help you relax. This drug will make you sleepy. It is usually given in a vein (by IV).  Don't do anything for 24 hours that requires attention to detail. It takes time for the medicine effects to completely wear off.  Do not sign any legally binding contracts or documents over the next 24 hours.  For your safety, you should not drive or operate any machinery that could be dangerous until the medicine wears off and you can think clearly and react easily.    Follow-up with physician in 7-10 days.    Take your medication as ordered.      If you have any questions, you may call [phone] or your physicians office

## 2024-01-18 NOTE — H&P
TABLET BY MOUTH TWICE A  tablet 3    Multiple Vitamins-Minerals (PRESERVISION AREDS PO) Take 2 tablets by mouth daily      glipiZIDE (GLUCOTROL) 5 MG tablet  (Patient not taking: Reported on 1/5/2024)      fluticasone (FLONASE) 50 MCG/ACT nasal spray 2 sprays by Each Nostril route as needed      sertraline (ZOLOFT) 100 MG tablet Take 1.5 tablets by mouth daily      triamcinolone (KENALOG) 0.1 % lotion Apply topically 3 times daily Apply topically 3 times daily.      rosuvastatin (CRESTOR) 10 MG tablet Take 1 tablet by mouth daily      Cholecalciferol (VITAMIN D3) 2000 units CAPS Take 3 capsules by mouth daily      aspirin 81 MG EC tablet Take 1 tablet by mouth daily         Review of Systems:   Review of Systems   Constitutional:  Positive for fatigue. Negative for activity change, chills and fever.   HENT:  Negative for congestion, ear pain and tinnitus.    Eyes:  Negative for photophobia, pain and visual disturbance.   Respiratory:  Positive for shortness of breath. Negative for cough, chest tightness and wheezing.    Cardiovascular:  Negative for chest pain, palpitations and leg swelling.   Gastrointestinal:  Negative for abdominal pain, blood in stool, constipation, diarrhea, nausea and vomiting.   Endocrine: Negative for cold intolerance and heat intolerance.   Genitourinary:  Negative for dysuria, flank pain and hematuria.   Musculoskeletal:  Positive for arthralgias. Negative for back pain, myalgias and neck stiffness.   Skin:  Negative for color change and rash.   Allergic/Immunologic: Negative for food allergies.   Neurological:  Negative for dizziness, light-headedness, numbness and headaches.   Hematological:  Does not bruise/bleed easily.   Psychiatric/Behavioral:  Negative for agitation, behavioral problems and confusion.            Examination:      Vitals:    01/18/24 1022 01/18/24 1027   BP: (!) 144/101    Resp: 20 (!) 95   Temp: (!) 96.4 °F (35.8 °C)    TempSrc: Temporal    SpO2: 100%   one validation study and 8.70 bleeds per 100 patient-years in another validation study.     Patient with risk of stroke with out anticoagulation and is risk of bleeding with anticoagulation so will be MELBA closure candidate    Discussed with patient about left atrial appendage closure device role in detail. We are currently doing only watchman so we discussed about the device in detail.     We discussed that this device is only used for patients who are unable to tolerate long term anticoagulation because of bleeding or falls or other reasons where they are high risk with anticoagulation     Patient may still need to be on anticoagulant and aspirin for 45 days post procedure and we do a VIRAJ and if VIRAJ shows there is a good seal of LA appendage with device then we change to aspirin and plavix (or other equivalent) for 6 months and then aspirin only after that.  According to the new FDA recommendations patient can be only aspirin and plavix for 6 months post procedure and then aspirin only. We may consider this approach in patient case by case basis provided they do not have any MELBA clot at the time of implantation.     The risks including, but not limited to, vascular injury, bleeding, infection, radiation exposure, injury to cardiac and surrounding structures (including esophageal and pulmonary vein injury), injury to the normal conduction system of the heart (possibly requiring a pacemaker), Pericardial effusion or cardiac puncture, stroke, myocardial infarction and death were all discussed. The patient considered the risks, benefits and alternatives;     Education provided to the patient and also education material given to the patient.     Thanks again for allowing me to participate in care of this patient. Please call me if you have any questions.    With best regards.      Gray Pascual MD, 1/18/2024 11:28 AM     Please note this report has been partially produced using speech recognition software and may

## 2024-01-18 NOTE — PLAN OF CARE
Called and gave report to Manoj GUNTER at [place] in order for patient to return after discharge at 1630 today. Lynnette Cheema RN 1/18/2024

## 2024-01-18 NOTE — DISCHARGE SUMMARY
[facility]  Discharge Summary    Balta Meier Sr.  :  1950  MRN:  [id number]    Admit date:  2024  Discharge date:      Admitting Physician:  Gray Pascual MD    Discharge Diagnoses:     1. Sp Watchman device procedure        Admission Condition:  fair    Discharged Condition:  good    Hospital Course:   Patient with hx of hypertension, diabetes, CVA, atrial fibrillation presented for implantation of watchman device. Patient tolerated the procedure well. Observed overnight. Patient groin access site was clean, no hematoma and no tenderness, No bruit. Echo showed no pericardial effusion.  Patient stable for discharge with out patient follow up.    Patient given instructions of continuing anticoagulation and aspirin for 45 days  Then he will get a VIRAJ at 45 days and based on the VIRAJ results his anticoagulation course will be decided    Patient is doing well following WATCHMAN implant.  Ambulating without any issues      Discharge Medications:      Current Discharge Medication List             Details   clopidogrel (PLAVIX) 75 MG tablet Take 1 tablet by mouth daily  Qty: 30 tablet, Refills: 5      dilTIAZem (CARDIZEM CD) 180 MG extended release capsule Take 1 capsule by mouth daily  Qty: 30 capsule, Refills: 3      tamsulosin (FLOMAX) 0.4 MG capsule Take 1 capsule by mouth daily  Qty: 30 capsule, Refills: 3      metOLazone (ZAROXOLYN) 2.5 MG tablet Take 1 tablet by mouth daily  Qty: 90 tablet, Refills: 3      furosemide (LASIX) 40 MG tablet Take 1 tablet by mouth 2 times daily  Qty: 60 tablet, Refills: 5      albuterol sulfate HFA (PROVENTIL;VENTOLIN;PROAIR) 108 (90 Base) MCG/ACT inhaler Inhale 2 puffs into the lungs every 4 hours as needed for Wheezing  Qty: 1 each, Refills: 11      umeclidinium-vilanterol (ANORO ELLIPTA) 62.5-25 MCG/ACT inhaler Inhale 1 puff into the lungs daily  Qty: 1 each, Refills: 5      montelukast (SINGULAIR) 10 MG tablet Take 1 tablet by mouth daily  Qty: 30 tablet, Refills: 5       levETIRAcetam (KEPPRA) 500 MG tablet       pantoprazole (PROTONIX) 40 MG tablet Take 1 tablet by mouth every morning (before breakfast)  Qty: 30 tablet, Refills: 5      lactulose (CHRONULAC) 10 GM/15ML solution Take 15 mLs by mouth 3 times daily  Qty: 250 mL, Refills: 1      spironolactone (ALDACTONE) 25 MG tablet TAKE ONE TABLET BY MOUTH TWICE A DAY  Qty: 180 tablet, Refills: 3      Multiple Vitamins-Minerals (PRESERVISION AREDS PO) Take 2 tablets by mouth daily      glipiZIDE (GLUCOTROL) 5 MG tablet       fluticasone (FLONASE) 50 MCG/ACT nasal spray 2 sprays by Each Nostril route as needed      sertraline (ZOLOFT) 100 MG tablet Take 1.5 tablets by mouth daily      triamcinolone (KENALOG) 0.1 % lotion Apply topically 3 times daily Apply topically 3 times daily.      rosuvastatin (CRESTOR) 10 MG tablet Take 1 tablet by mouth daily      Cholecalciferol (VITAMIN D3) 2000 units CAPS Take 3 capsules by mouth daily      aspirin 81 MG EC tablet Take 1 tablet by mouth daily             Consults:  none      Discharge Exam:  /69   Pulse 96   Temp 97.6 °F (36.4 °C) (Temporal)   Resp 26   Ht 1.727 m (5' 8\")   Wt 96.6 kg (213 lb)   SpO2 95%   BMI 32.39 kg/m²   General appearance: alert, appears stated age, and cooperative  Head: Normocephalic, without obvious abnormality, atraumatic  Lungs: clear to auscultation bilaterally  Heart: regular rate and rhythm, S1, S2 normal, no murmur, click, rub or gallop  Extremities: extremities normal, atraumatic, no cyanosis or edema  Pulses: 2+ and symmetric  Skin: Skin color, texture, turgor normal. No rashes or lesions. Right femoral groin site soft, nontender, no hematoma  Neurologic: Grossly normal    Disposition:   home    It is medically necessary that patients undergoing percutaneous left atrial appendage occlusion are admitted as an inpatient. This patient had a recovery that was earlier than expected and can be discharged today. Patient will follow with Dr. Pascual/Zara

## 2024-01-19 ENCOUNTER — TELEPHONE (OUTPATIENT)
Age: 74
End: 2024-01-19

## 2024-01-19 NOTE — TELEPHONE ENCOUNTER
Post [facility] day 1 call done.   Patient states she is doing well. .   Groin has no bruising or hematoma present.   Patient is taking anticoagulants as prescribed.   Denies c/o or needs.  All questions answered.   Will call me if questions arise.    Electronically signed by Jodi Gill RN on 1/19/2024 at 10:22 AM [facility] Care Coordinator

## 2024-01-25 ENCOUNTER — TELEPHONE (OUTPATIENT)
Dept: CARDIOLOGY CLINIC | Age: 74
End: 2024-01-25

## 2024-01-25 ENCOUNTER — OFFICE VISIT (OUTPATIENT)
Dept: CARDIOLOGY CLINIC | Age: 74
End: 2024-01-25
Payer: MEDICARE

## 2024-01-25 VITALS
DIASTOLIC BLOOD PRESSURE: 82 MMHG | SYSTOLIC BLOOD PRESSURE: 138 MMHG | HEIGHT: 69 IN | HEART RATE: 96 BPM | WEIGHT: 213 LBS | BODY MASS INDEX: 31.55 KG/M2

## 2024-01-25 DIAGNOSIS — I48.19 HYPERCOAGULABLE STATE DUE TO PERSISTENT ATRIAL FIBRILLATION (HCC): ICD-10-CM

## 2024-01-25 DIAGNOSIS — Z95.818 PRESENCE OF WATCHMAN LEFT ATRIAL APPENDAGE CLOSURE DEVICE: Primary | ICD-10-CM

## 2024-01-25 DIAGNOSIS — I48.0 PAROXYSMAL ATRIAL FIBRILLATION (HCC): ICD-10-CM

## 2024-01-25 DIAGNOSIS — I10 ESSENTIAL HYPERTENSION: ICD-10-CM

## 2024-01-25 DIAGNOSIS — D68.69 HYPERCOAGULABLE STATE DUE TO PERSISTENT ATRIAL FIBRILLATION (HCC): ICD-10-CM

## 2024-01-25 PROCEDURE — 1124F ACP DISCUSS-NO DSCNMKR DOCD: CPT | Performed by: NURSE PRACTITIONER

## 2024-01-25 PROCEDURE — 99214 OFFICE O/P EST MOD 30 MIN: CPT | Performed by: NURSE PRACTITIONER

## 2024-01-25 PROCEDURE — 93000 ELECTROCARDIOGRAM COMPLETE: CPT | Performed by: NURSE PRACTITIONER

## 2024-01-25 PROCEDURE — 3079F DIAST BP 80-89 MM HG: CPT | Performed by: NURSE PRACTITIONER

## 2024-01-25 PROCEDURE — 3075F SYST BP GE 130 - 139MM HG: CPT | Performed by: NURSE PRACTITIONER

## 2024-01-25 ASSESSMENT — ENCOUNTER SYMPTOMS
ABDOMINAL PAIN: 0
SHORTNESS OF BREATH: 0
BACK PAIN: 0
SINUS PAIN: 0
COLOR CHANGE: 0
COUGH: 0
PHOTOPHOBIA: 0
ABDOMINAL DISTENTION: 0
BLOOD IN STOOL: 0
SINUS PRESSURE: 0

## 2024-01-25 NOTE — PROGRESS NOTES
Electrophysiology Follow up Note      Reason for consultation:  atrial fibrillation and assess for watchman    Chief complaint : here for prewtachman VIRAJ    Referring physician:       Primary care physician: Adam Mcgill MD      History of Present Illness:     This visit 1/25/2024  Patient is here today for 1 week follow-up status post implantation of Watchman device.  Patient reports he is feeling well.  He denies chest pain, palpitations, shortness of breath, lightheadedness, dizziness, edema or syncope.  He is taking Plavix and aspirin as prescribed.    Previous visit:     Patient is a 73-year-old male with a history of hypertension, diabetes, CVA, atrial fibrillation referred by Dr. Barajas for A-fib and Watchman assessment.  Patient is on oxygen and with recent influenza pneumonia and having hypoxia so she is on oxygen right now.      Patient had been off Coumadin because of subdural bleed because of frequent falls.      Patient used to be a  until 5 years ago and very active but he hurt his quadriceps and then he stopped playing tennis.  He still is trying to be active but with his recent pneumonia about he has a problems.  Patient has not been off of Coumadin since August.  He has history of stroke.  Almost lost his vision and he came back.    Denies chest pain, palpitations, dizziness or syncope.  He has shortness of breath and is on oxygen.    He wants to discuss about Watchman.    Pastmedical history:   Past Medical History:   Diagnosis Date    Diabetes mellitus (HCC)     Hypertension     Kidney disease     patient sees Dr Sykes    Pleurisy with effusion 9/27/2023    Unspecified cerebral artery occlusion with cerebral infarction        Surgical history :   Past Surgical History:   Procedure Laterality Date    ACHILLES TENDON SURGERY Right     EP DEVICE PROCEDURE N/A 1/18/2024    Watchman ken closure device performed by

## 2024-01-25 NOTE — TELEPHONE ENCOUNTER
Called patient and given dates and times for VIRAJ 3/4/2024 1030 and PPW 2/29/2023 @ 1030. No other c/o noted at present.

## 2024-01-26 ENCOUNTER — TELEPHONE (OUTPATIENT)
Dept: PULMONOLOGY | Age: 74
End: 2024-01-26

## 2024-01-26 NOTE — TELEPHONE ENCOUNTER
Tried to call patient ref his 2/26 appt. Dr. Larson ordered 6MWT/PFT which are not scheduled yet. Call was dropped, waiting for patient to call back at this time.

## 2024-01-29 NOTE — ANESTHESIA POSTPROCEDURE EVALUATION
Department of Anesthesiology  Postprocedure Note    Patient: Balta Meier Sr.  MRN: [id number]  YOB: 1950  Date of evaluation: 1/29/2024    Procedure Summary       Date: 01/18/24 Room / Location: [facility] CATH LAB  / [facility] CARDIAC CATH LAB    Anesthesia Start: 1155 Anesthesia Stop: 1310    Procedure: Watchman ken closure device Diagnosis:       Atrial fibrillation, unspecified type (HCC)      (Atrial fibrillation, unspecified type (HCC) [I48.91])    Providers: Gray Pascual MD Responsible Provider: Roger Sena MD    Anesthesia Type: general ASA Status: 3            Anesthesia Type: No value filed.    Carolynn Phase I: Carolynn Score: 9    Carolynn Phase II:      Anesthesia Post Evaluation    Patient location during evaluation: PACU  Patient participation: complete - patient participated  Level of consciousness: awake  Pain score: 1  Airway patency: patent  Nausea & Vomiting: no nausea and no vomiting  Cardiovascular status: hemodynamically stable  Respiratory status: nasal cannula  Hydration status: euvolemic  Multimodal analgesia pain management approach    There were no known notable events for this encounter.

## 2024-02-26 ENCOUNTER — OFFICE VISIT (OUTPATIENT)
Dept: PULMONOLOGY | Age: 74
End: 2024-02-26
Payer: MEDICARE

## 2024-02-26 VITALS
HEART RATE: 98 BPM | BODY MASS INDEX: 33.77 KG/M2 | RESPIRATION RATE: 16 BRPM | WEIGHT: 228 LBS | OXYGEN SATURATION: 90 % | DIASTOLIC BLOOD PRESSURE: 68 MMHG | HEIGHT: 69 IN | SYSTOLIC BLOOD PRESSURE: 110 MMHG

## 2024-02-26 DIAGNOSIS — E66.9 OBESITY (BMI 30-39.9): ICD-10-CM

## 2024-02-26 DIAGNOSIS — G47.10 HYPERSOMNIA: ICD-10-CM

## 2024-02-26 DIAGNOSIS — R09.02 HYPOXIA: ICD-10-CM

## 2024-02-26 DIAGNOSIS — G47.33 OBSTRUCTIVE SLEEP APNEA: Primary | ICD-10-CM

## 2024-02-26 DIAGNOSIS — R06.02 SOBOE (SHORTNESS OF BREATH ON EXERTION): ICD-10-CM

## 2024-02-26 PROCEDURE — 3074F SYST BP LT 130 MM HG: CPT | Performed by: INTERNAL MEDICINE

## 2024-02-26 PROCEDURE — 99215 OFFICE O/P EST HI 40 MIN: CPT | Performed by: INTERNAL MEDICINE

## 2024-02-26 PROCEDURE — 3078F DIAST BP <80 MM HG: CPT | Performed by: INTERNAL MEDICINE

## 2024-02-26 PROCEDURE — 1124F ACP DISCUSS-NO DSCNMKR DOCD: CPT | Performed by: INTERNAL MEDICINE

## 2024-02-26 ASSESSMENT — ENCOUNTER SYMPTOMS
EYE ITCHING: 0
SHORTNESS OF BREATH: 1
ABDOMINAL PAIN: 0
BACK PAIN: 0
ABDOMINAL DISTENTION: 0
EYE DISCHARGE: 0
COUGH: 0

## 2024-02-26 NOTE — PROGRESS NOTES
Balta Meier Sr.  1950  Referring Provider: Adam Mcgill, [facility] - General     Subjective:     Chief Complaint   Patient presents with    Follow-up     Patient did not complete sleep study or PFT    Pulmonary Nodule    Sleep Apnea    COPD       HPI  Balta is a 73 y.o. male has come back as a follow up. 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 He has no h/o smoking. He is on 2 L.min of oxygen.     He has Afib being followed by [facility].He has a watchman device.He is on Lasix.    He had a CXR done on 01/11/24 showed:    Unchanged opacity at the right lung base appears to represent right pleural  fluid combined with atelectasis    He had no PFT's and 6 MWT.     He doesn't want to sleep study later.     Current Outpatient Medications   Medication Sig Dispense Refill    clopidogrel (PLAVIX) 75 MG tablet Take 1 tablet by mouth daily 30 tablet 5    dilTIAZem (CARDIZEM CD) 180 MG extended release capsule Take 1 capsule by mouth daily 30 capsule 3    tamsulosin (FLOMAX) 0.4 MG capsule Take 1 capsule by mouth daily 30 capsule 3    metOLazone (ZAROXOLYN) 2.5 MG tablet Take 1 tablet by mouth daily 90 tablet 3    furosemide (LASIX) 40 MG tablet Take 1 tablet by mouth 2 times daily 60 tablet 5    albuterol sulfate HFA (PROVENTIL;VENTOLIN;PROAIR) 108 (90 Base) MCG/ACT inhaler Inhale 2 puffs into the lungs every 4 hours as needed for Wheezing 1 each 11    umeclidinium-vilanterol (ANORO ELLIPTA) 62.5-25 MCG/ACT inhaler Inhale 1 puff into the lungs daily 1 each 5    montelukast (SINGULAIR) 10 MG tablet Take 1 tablet by mouth daily 30 tablet 5    levETIRAcetam (KEPPRA) 500 MG tablet       pantoprazole (PROTONIX) 40 MG tablet Take 1 tablet by mouth every morning (before breakfast) 30 tablet 5    lactulose (CHRONULAC) 10 GM/15ML solution Take 15 mLs by mouth 3 times daily 250 mL 1    spironolactone (ALDACTONE) 25 MG tablet TAKE ONE TABLET BY MOUTH TWICE A DAY

## 2024-02-29 ENCOUNTER — NURSE ONLY (OUTPATIENT)
Dept: CARDIOLOGY CLINIC | Age: 74
End: 2024-02-29

## 2024-02-29 ENCOUNTER — HOSPITAL ENCOUNTER (OUTPATIENT)
Age: 74
Discharge: HOME | End: 2024-02-29
Payer: MEDICARE

## 2024-02-29 DIAGNOSIS — Z95.818 PRESENCE OF WATCHMAN LEFT ATRIAL APPENDAGE CLOSURE DEVICE: ICD-10-CM

## 2024-02-29 DIAGNOSIS — Z95.818 PRESENCE OF WATCHMAN LEFT ATRIAL APPENDAGE CLOSURE DEVICE: Primary | ICD-10-CM

## 2024-02-29 LAB
ANION GAP SERPL CALCULATED.3IONS-SCNC: 11 MMOL/L (ref 7–16)
APTT: 33.2 SECONDS (ref 25.1–37.1)
BUN SERPL-MCNC: 29 MG/DL (ref 6–23)
CALCIUM SERPL-MCNC: 9.5 MG/DL (ref 8.3–10.6)
CHLORIDE BLD-SCNC: 90 MMOL/L (ref 99–110)
CO2: 35 MMOL/L (ref 21–32)
CREAT SERPL-MCNC: 2.3 MG/DL (ref 0.9–1.3)
GFR SERPL CREATININE-BSD FRML MDRD: 29 ML/MIN/1.73M2
GLUCOSE SERPL-MCNC: 211 MG/DL (ref 70–99)
HCT VFR BLD CALC: 30.7 % (ref 42–52)
HEMOGLOBIN: 9 GM/DL (ref 13.5–18)
INR BLD: 1.1 INDEX
MAGNESIUM: 2.2 MG/DL (ref 1.8–2.4)
MCH RBC QN AUTO: 32.3 PG (ref 27–31)
MCHC RBC AUTO-ENTMCNC: 29.3 % (ref 32–36)
MCV RBC AUTO: 110 FL (ref 78–100)
PDW BLD-RTO: 14.3 % (ref 11.7–14.9)
PHOSPHORUS: 3.1 MG/DL (ref 2.5–4.9)
PLATELET # BLD: 153 K/CU MM (ref 140–440)
PMV BLD AUTO: 10.1 FL (ref 7.5–11.1)
POTASSIUM SERPL-SCNC: 4 MMOL/L (ref 3.5–5.1)
PROTHROMBIN TIME: 14.1 SECONDS (ref 11.7–14.5)
RBC # BLD: 2.79 M/CU MM (ref 4.6–6.2)
SODIUM BLD-SCNC: 136 MMOL/L (ref 135–145)
WBC # BLD: 9.6 K/CU MM (ref 4–10.5)

## 2024-02-29 PROCEDURE — 36415 COLL VENOUS BLD VENIPUNCTURE: CPT

## 2024-02-29 PROCEDURE — 83735 ASSAY OF MAGNESIUM: CPT

## 2024-02-29 PROCEDURE — 85027 COMPLETE CBC AUTOMATED: CPT

## 2024-02-29 PROCEDURE — 85730 THROMBOPLASTIN TIME PARTIAL: CPT

## 2024-02-29 PROCEDURE — 80048 BASIC METABOLIC PNL TOTAL CA: CPT

## 2024-02-29 PROCEDURE — 85610 PROTHROMBIN TIME: CPT

## 2024-02-29 PROCEDURE — 84100 ASSAY OF PHOSPHORUS: CPT

## 2024-02-29 NOTE — PROGRESS NOTES
Patient here in office and educated on 2/29/2024, scheduled for VIRAJ on 3/4/2024 @ 1030, with arrival @ 0930, @ [facility]; risk explained; and consents signed. Also copy of orders given for labs due [facility]/29/20[facility] at [facility]. Instruction given to patient to :NPO after midnight the night before procedure; call hospital at [phone] to pre-register. May take rest of morning medications day of procedure except the following; Hold Aldactone the morning of the procedure. Patient voiced understanding. Copies of consent & info scanned in chart.     28

## 2024-03-04 ENCOUNTER — HOSPITAL ENCOUNTER (OUTPATIENT)
Dept: NON INVASIVE DIAGNOSTICS | Age: 74
Discharge: HOME | End: 2024-03-06
Payer: MEDICARE

## 2024-03-04 VITALS
HEART RATE: 83 BPM | SYSTOLIC BLOOD PRESSURE: 116 MMHG | RESPIRATION RATE: 17 BRPM | WEIGHT: 228 LBS | TEMPERATURE: 98.3 F | OXYGEN SATURATION: 100 % | BODY MASS INDEX: 34.56 KG/M2 | HEIGHT: 68 IN | DIASTOLIC BLOOD PRESSURE: 59 MMHG

## 2024-03-04 DIAGNOSIS — Z95.818 PRESENCE OF WATCHMAN LEFT ATRIAL APPENDAGE CLOSURE DEVICE: ICD-10-CM

## 2024-03-04 LAB — ECHO BSA: 2.23 M2

## 2024-03-04 PROCEDURE — 93325 DOPPLER ECHO COLOR FLOW MAPG: CPT

## 2024-03-04 PROCEDURE — 7100000011 HC PHASE II RECOVERY - ADDTL 15 MIN: Performed by: INTERNAL MEDICINE

## 2024-03-04 PROCEDURE — 99152 MOD SED SAME PHYS/QHP 5/>YRS: CPT | Performed by: INTERNAL MEDICINE

## 2024-03-04 PROCEDURE — 7100000010 HC PHASE II RECOVERY - FIRST 15 MIN: Performed by: INTERNAL MEDICINE

## 2024-03-04 PROCEDURE — 6360000002 HC RX W HCPCS: Performed by: INTERNAL MEDICINE

## 2024-03-04 PROCEDURE — 6370000000 HC RX 637 (ALT 250 FOR IP): Performed by: INTERNAL MEDICINE

## 2024-03-04 RX ORDER — LIDOCAINE HYDROCHLORIDE 20 MG/ML
SOLUTION OROPHARYNGEAL PRN
Status: COMPLETED | OUTPATIENT
Start: 2024-03-04 | End: 2024-03-04

## 2024-03-04 RX ORDER — MIDAZOLAM HYDROCHLORIDE 1 MG/ML
INJECTION INTRAMUSCULAR; INTRAVENOUS PRN
Status: COMPLETED | OUTPATIENT
Start: 2024-03-04 | End: 2024-03-04

## 2024-03-04 RX ORDER — FENTANYL CITRATE 50 UG/ML
INJECTION, SOLUTION INTRAMUSCULAR; INTRAVENOUS PRN
Status: COMPLETED | OUTPATIENT
Start: 2024-03-04 | End: 2024-03-04

## 2024-03-04 RX ADMIN — FENTANYL CITRATE 25 MCG: 50 INJECTION, SOLUTION INTRAMUSCULAR; INTRAVENOUS at 09:41

## 2024-03-04 RX ADMIN — LIDOCAINE HYDROCHLORIDE 15 ML: 20 SOLUTION ORAL; TOPICAL at 09:40

## 2024-03-04 RX ADMIN — MIDAZOLAM 0.5 MG: 1 INJECTION INTRAMUSCULAR; INTRAVENOUS at 09:42

## 2024-03-04 RX ADMIN — MIDAZOLAM 1 MG: 1 INJECTION INTRAMUSCULAR; INTRAVENOUS at 09:41

## 2024-03-04 RX ADMIN — FENTANYL CITRATE 25 MCG: 50 INJECTION, SOLUTION INTRAMUSCULAR; INTRAVENOUS at 09:44

## 2024-03-04 ASSESSMENT — ENCOUNTER SYMPTOMS
PHOTOPHOBIA: 0
BACK PAIN: 0
SINUS PRESSURE: 0
COUGH: 0
BLOOD IN STOOL: 0
SHORTNESS OF BREATH: 0
ABDOMINAL PAIN: 0
COLOR CHANGE: 0
ABDOMINAL DISTENTION: 0
SINUS PAIN: 0

## 2024-03-04 NOTE — H&P
Negative for agitation and confusion. The patient is not nervous/anxious.            Examination:      Vitals:    03/04/24 1008 03/04/24 1018 03/04/24 1028 03/04/24 1039   BP: (!) 117/57 (!) 115/58 (!) 114/56 (!) 116/59   Pulse: 82 84 82 83   Resp:  21 16 17   Temp:       SpO2:  99% 99% 100%   Weight: 103.4 kg (228 lb)      Height: 1.727 m (5' 8\")           Body mass index is 34.67 kg/m².      Physical Exam  Constitutional:       Appearance: Normal appearance.   HENT:      Head: Normocephalic and atraumatic.      Right Ear: External ear normal.      Left Ear: External ear normal.      Nose: Nose normal.      Mouth/Throat:      Mouth: Mucous membranes are moist.   Eyes:      General:         Right eye: No discharge.         Left eye: No discharge.      Conjunctiva/sclera: Conjunctivae normal.   Cardiovascular:      Rate and Rhythm: Normal rate and regular rhythm.      Pulses: Normal pulses.      Heart sounds: No murmur heard.  Pulmonary:      Effort: Pulmonary effort is normal.      Breath sounds: No wheezing, rhonchi or rales.   Abdominal:      General: Abdomen is flat.      Palpations: Abdomen is soft.   Musculoskeletal:         General: Normal range of motion.      Cervical back: Normal range of motion.      Right lower leg: No edema.      Left lower leg: No edema.   Skin:     General: Skin is warm and dry.      Comments: Right femoral groin site is soft, nontender, no hematoma.    Neurological:      General: No focal deficit present.      Mental Status: He is alert and oriented to person, place, and time.   Psychiatric:         Mood and Affect: Mood normal.         Thought Content: Thought content normal.               CBC:   Lab Results   Component Value Date/Time    WBC 9.6 02/29/2024 01:51 PM    HGB 9.0 02/29/2024 01:51 PM    HCT 30.7 02/29/2024 01:51 PM     02/29/2024 01:51 PM     Lipids:  Lab Results   Component Value Date    CHOL 210 (H) 05/03/2022    TRIG 211 (H) 05/03/2022    HDL 72 05/03/2022

## 2024-03-07 ENCOUNTER — OFFICE VISIT (OUTPATIENT)
Dept: CARDIOLOGY CLINIC | Age: 74
End: 2024-03-07
Payer: MEDICARE

## 2024-03-07 VITALS
OXYGEN SATURATION: 92 % | WEIGHT: 228 LBS | BODY MASS INDEX: 34.56 KG/M2 | HEIGHT: 68 IN | SYSTOLIC BLOOD PRESSURE: 130 MMHG | RESPIRATION RATE: 16 BRPM | HEART RATE: 85 BPM | DIASTOLIC BLOOD PRESSURE: 60 MMHG

## 2024-03-07 DIAGNOSIS — I48.0 HYPERCOAGULABLE STATE DUE TO PAROXYSMAL ATRIAL FIBRILLATION (HCC): ICD-10-CM

## 2024-03-07 DIAGNOSIS — Z95.818 PRESENCE OF WATCHMAN LEFT ATRIAL APPENDAGE CLOSURE DEVICE: Primary | ICD-10-CM

## 2024-03-07 DIAGNOSIS — I48.0 PAROXYSMAL ATRIAL FIBRILLATION (HCC): ICD-10-CM

## 2024-03-07 DIAGNOSIS — I10 ESSENTIAL HYPERTENSION: ICD-10-CM

## 2024-03-07 DIAGNOSIS — D68.69 HYPERCOAGULABLE STATE DUE TO PAROXYSMAL ATRIAL FIBRILLATION (HCC): ICD-10-CM

## 2024-03-07 PROCEDURE — 3075F SYST BP GE 130 - 139MM HG: CPT | Performed by: NURSE PRACTITIONER

## 2024-03-07 PROCEDURE — 1124F ACP DISCUSS-NO DSCNMKR DOCD: CPT | Performed by: NURSE PRACTITIONER

## 2024-03-07 PROCEDURE — 99214 OFFICE O/P EST MOD 30 MIN: CPT | Performed by: NURSE PRACTITIONER

## 2024-03-07 PROCEDURE — 3078F DIAST BP <80 MM HG: CPT | Performed by: NURSE PRACTITIONER

## 2024-03-07 PROCEDURE — 93000 ELECTROCARDIOGRAM COMPLETE: CPT | Performed by: NURSE PRACTITIONER

## 2024-03-07 ASSESSMENT — ENCOUNTER SYMPTOMS
COUGH: 0
COLOR CHANGE: 0
SHORTNESS OF BREATH: 0
ABDOMINAL PAIN: 0
PHOTOPHOBIA: 0
ABDOMINAL DISTENTION: 0
SINUS PAIN: 0
BACK PAIN: 0
SINUS PRESSURE: 0
BLOOD IN STOOL: 0

## 2024-03-07 NOTE — PROGRESS NOTES
Electrophysiology Follow up Note      Reason for consultation:  atrial fibrillation and assess for watchman    Chief complaint : here for follow up on VIRAJ s/p watchman    Referring physician:       Primary care physician: Adam Mcgill MD      History of Present Illness:     This visit 3/7/2024  Patient is here today for follow-up on VIRAJ status post implantation of Watchman device.  Patient reports he is feeling well.  He denies chest pain, palpitations, shortness of breath, lightheadedness, dizziness, edema or syncope.  He does complain of nosebleed.    Previous visit 1/25/2024  Patient is here today for 1 week follow-up status post implantation of Watchman device.  Patient reports he is feeling well.  He denies chest pain, palpitations, shortness of breath, lightheadedness, dizziness, edema or syncope.  He is taking Plavix and aspirin as prescribed.    Previous visit:     Patient is a 73-year-old male with a history of hypertension, diabetes, CVA, atrial fibrillation referred by Dr. Barajas for A-fib and Watchman assessment.  Patient is on oxygen and with recent influenza pneumonia and having hypoxia so she is on oxygen right now.      Patient had been off Coumadin because of subdural bleed because of frequent falls.      Patient used to be a  until 5 years ago and very active but he hurt his quadriceps and then he stopped playing tennis.  He still is trying to be active but with his recent pneumonia about he has a problems.  Patient has not been off of Coumadin since August.  He has history of stroke.  Almost lost his vision and he came back.    Denies chest pain, palpitations, dizziness or syncope.  He has shortness of breath and is on oxygen.    He wants to discuss about Watchman.    Pastmedical history:   Past Medical History:   Diagnosis Date    Diabetes mellitus (HCC)     Hypertension     Kidney disease     patient sees

## 2024-04-12 NOTE — PROGRESS NOTES
Pt returned to room from endo   Report received from [address]  Pt A&O, call light placed within reach, side rails up x's 2, pt given coffee to drink  1515 Dr Barrington Chadwick in room to speak with pt and wife  1522 pt assisted up in room to get dressed with assistance of wife  [phone] Discharge instructions given to pt and wife, both voiced understanding  [phone] Pt escorted to main entrance via wheelchair for discharge.
Spoke wit patient and his wife Marga Moses and patient will arrive at [phone] at [facility] on 6/6/2023 for his procedure at [phone]. IV order in epic, Patient's wife stated \" that she hoped their transportation would be able to be able to pick them up, because the time changed. \"
No

## 2024-05-28 ENCOUNTER — HOSPITAL ENCOUNTER (OUTPATIENT)
Age: 74
Discharge: HOME | End: 2024-05-28
Payer: MEDICARE

## 2024-05-28 DIAGNOSIS — I10 ESSENTIAL HYPERTENSION: ICD-10-CM

## 2024-05-28 DIAGNOSIS — I48.0 PAROXYSMAL ATRIAL FIBRILLATION (HCC): ICD-10-CM

## 2024-05-28 DIAGNOSIS — E11.9 TYPE 2 DIABETES MELLITUS WITHOUT COMPLICATION, WITHOUT LONG-TERM CURRENT USE OF INSULIN (HCC): ICD-10-CM

## 2024-05-28 DIAGNOSIS — N18.32 STAGE 3B CHRONIC KIDNEY DISEASE (HCC): ICD-10-CM

## 2024-05-28 DIAGNOSIS — I51.89 DIASTOLIC DYSFUNCTION: ICD-10-CM

## 2024-05-28 DIAGNOSIS — F10.11 ALCOHOL ABUSE, IN REMISSION: ICD-10-CM

## 2024-05-28 LAB
ALBUMIN SERPL-MCNC: 4.2 GM/DL (ref 3.4–5)
ANION GAP SERPL CALCULATED.3IONS-SCNC: 9 MMOL/L (ref 7–16)
BACTERIA: NEGATIVE /HPF
BILIRUBIN, URINE: NEGATIVE MG/DL
BLOOD, URINE: ABNORMAL
BUN SERPL-MCNC: 30 MG/DL (ref 6–23)
CALCIUM SERPL-MCNC: 9.4 MG/DL (ref 8.3–10.6)
CHLORIDE BLD-SCNC: 95 MMOL/L (ref 99–110)
CLARITY: CLEAR
CO2: 34 MMOL/L (ref 21–32)
COLOR: YELLOW
CREAT SERPL-MCNC: 2.8 MG/DL (ref 0.9–1.3)
CREATININE URINE: 42.7 MG/DL (ref 39–259)
GFR, ESTIMATED: 23 ML/MIN/1.73M2
GLUCOSE SERPL-MCNC: 173 MG/DL (ref 70–99)
GLUCOSE URINE: NEGATIVE MG/DL
KETONES, URINE: NEGATIVE MG/DL
LEUKOCYTE ESTERASE, URINE: NEGATIVE
MAGNESIUM: 2.2 MG/DL (ref 1.8–2.4)
MUCUS: ABNORMAL HPF
NITRITE URINE, QUANTITATIVE: NEGATIVE
PH, URINE: 7.5 (ref 5–8)
PHOSPHORUS: 3.5 MG/DL (ref 2.5–4.9)
POTASSIUM SERPL-SCNC: 4.2 MMOL/L (ref 3.5–5.1)
PROT/CREAT RATIO, UR: 0.7
PROTEIN UA: 30 MG/DL
RBC URINE: <1 /HPF (ref 0–3)
SODIUM BLD-SCNC: 138 MMOL/L (ref 135–145)
SPECIFIC GRAVITY UA: 1.01 (ref 1–1.03)
TRICHOMONAS: ABNORMAL /HPF
URINE TOTAL PROTEIN: 30.8 MG/DL
UROBILINOGEN, URINE: 1 MG/DL (ref 0.2–1)
WBC UA: <1 /HPF (ref 0–2)

## 2024-05-28 PROCEDURE — 84100 ASSAY OF PHOSPHORUS: CPT

## 2024-05-28 PROCEDURE — 82040 ASSAY OF SERUM ALBUMIN: CPT

## 2024-05-28 PROCEDURE — 82570 ASSAY OF URINE CREATININE: CPT

## 2024-05-28 PROCEDURE — 36415 COLL VENOUS BLD VENIPUNCTURE: CPT

## 2024-05-28 PROCEDURE — 83735 ASSAY OF MAGNESIUM: CPT

## 2024-05-28 PROCEDURE — 81001 URINALYSIS AUTO W/SCOPE: CPT

## 2024-05-28 PROCEDURE — 84156 ASSAY OF PROTEIN URINE: CPT

## 2024-05-28 PROCEDURE — 80048 BASIC METABOLIC PNL TOTAL CA: CPT

## 2024-06-05 ENCOUNTER — TELEPHONE (OUTPATIENT)
Dept: CARDIOLOGY CLINIC | Age: 74
End: 2024-06-05

## 2024-06-05 NOTE — TELEPHONE ENCOUNTER
Called patient to RS an appt with Zara Mcdaniel NP in July that needs rescheduled; Provider is out of the office that day. Was unable to LM as the phone just stopped ringing and no VM came on.

## 2024-06-28 RX ORDER — CLOPIDOGREL BISULFATE 75 MG/1
75 TABLET ORAL DAILY
Qty: 30 TABLET | Refills: 5 | Status: SHIPPED | OUTPATIENT
Start: 2024-06-28

## 2024-07-01 ENCOUNTER — OFFICE VISIT (OUTPATIENT)
Dept: CARDIOLOGY CLINIC | Age: 74
End: 2024-07-01
Payer: MEDICARE

## 2024-07-01 VITALS
SYSTOLIC BLOOD PRESSURE: 124 MMHG | HEIGHT: 68 IN | HEART RATE: 89 BPM | BODY MASS INDEX: 34.46 KG/M2 | WEIGHT: 227.4 LBS | DIASTOLIC BLOOD PRESSURE: 68 MMHG

## 2024-07-01 DIAGNOSIS — I48.0 PAROXYSMAL ATRIAL FIBRILLATION (HCC): ICD-10-CM

## 2024-07-01 DIAGNOSIS — I48.0 HYPERCOAGULABLE STATE DUE TO PAROXYSMAL ATRIAL FIBRILLATION (HCC): ICD-10-CM

## 2024-07-01 DIAGNOSIS — D68.69 HYPERCOAGULABLE STATE DUE TO PAROXYSMAL ATRIAL FIBRILLATION (HCC): ICD-10-CM

## 2024-07-01 DIAGNOSIS — Z95.818 PRESENCE OF WATCHMAN LEFT ATRIAL APPENDAGE CLOSURE DEVICE: Primary | ICD-10-CM

## 2024-07-01 DIAGNOSIS — I10 ESSENTIAL HYPERTENSION: ICD-10-CM

## 2024-07-01 PROCEDURE — 1124F ACP DISCUSS-NO DSCNMKR DOCD: CPT | Performed by: NURSE PRACTITIONER

## 2024-07-01 PROCEDURE — 93000 ELECTROCARDIOGRAM COMPLETE: CPT | Performed by: NURSE PRACTITIONER

## 2024-07-01 PROCEDURE — 3078F DIAST BP <80 MM HG: CPT | Performed by: NURSE PRACTITIONER

## 2024-07-01 PROCEDURE — 3074F SYST BP LT 130 MM HG: CPT | Performed by: NURSE PRACTITIONER

## 2024-07-01 PROCEDURE — 99214 OFFICE O/P EST MOD 30 MIN: CPT | Performed by: NURSE PRACTITIONER

## 2024-07-01 ASSESSMENT — ENCOUNTER SYMPTOMS
SINUS PAIN: 0
ABDOMINAL PAIN: 0
BACK PAIN: 0
PHOTOPHOBIA: 0
SHORTNESS OF BREATH: 0
COUGH: 0
SINUS PRESSURE: 0
BLOOD IN STOOL: 0
COLOR CHANGE: 0
ABDOMINAL DISTENTION: 0

## 2024-07-01 NOTE — PROGRESS NOTES
swelling.   Gastrointestinal:  Negative for abdominal distention, abdominal pain and blood in stool.   Endocrine: Negative for cold intolerance and heat intolerance.   Genitourinary:  Negative for difficulty urinating, dysuria and hematuria.   Musculoskeletal:  Positive for arthralgias. Negative for back pain.   Skin:  Negative for color change and rash.   Allergic/Immunologic: Negative for food allergies.   Neurological:  Negative for dizziness, syncope and light-headedness.   Hematological:  Does not bruise/bleed easily.   Psychiatric/Behavioral:  Negative for agitation and confusion. The patient is not nervous/anxious.            Examination:      Vitals:    07/01/24 1131   BP: 124/68   Site: Left Upper Arm   Position: Sitting   Cuff Size: Small Adult   Pulse: 89   Weight: 103.1 kg (227 lb 6.4 oz)   Height: 1.727 m (5' 8\")        Body mass index is 34.58 kg/m².      Physical Exam  Constitutional:       Appearance: Normal appearance.   HENT:      Head: Normocephalic and atraumatic.      Right Ear: External ear normal.      Left Ear: External ear normal.      Nose: Nose normal.      Mouth/Throat:      Mouth: Mucous membranes are moist.   Eyes:      General:         Right eye: No discharge.         Left eye: No discharge.      Conjunctiva/sclera: Conjunctivae normal.   Cardiovascular:      Rate and Rhythm: Normal rate and regular rhythm.      Pulses: Normal pulses.      Heart sounds: No murmur heard.  Pulmonary:      Effort: Pulmonary effort is normal.      Breath sounds: No wheezing, rhonchi or rales.   Abdominal:      General: Abdomen is flat.      Palpations: Abdomen is soft.   Musculoskeletal:         General: Normal range of motion.      Cervical back: Normal range of motion.      Right lower leg: No edema.      Left lower leg: No edema.   Skin:     General: Skin is warm and dry.   Neurological:      General: No focal deficit present.      Mental Status: He is alert and oriented to person, place, and time.

## 2024-08-26 ENCOUNTER — HOSPITAL ENCOUNTER (OUTPATIENT)
Age: 74
Discharge: HOME | End: 2024-08-26
Payer: MEDICARE

## 2024-08-26 DIAGNOSIS — G47.33 OSA (OBSTRUCTIVE SLEEP APNEA): ICD-10-CM

## 2024-08-26 DIAGNOSIS — I10 ESSENTIAL HYPERTENSION: ICD-10-CM

## 2024-08-26 DIAGNOSIS — N18.4 CHRONIC KIDNEY DISEASE, STAGE IV (SEVERE) (HCC): ICD-10-CM

## 2024-08-26 DIAGNOSIS — Z95.818 PRESENCE OF WATCHMAN LEFT ATRIAL APPENDAGE CLOSURE DEVICE: ICD-10-CM

## 2024-08-26 DIAGNOSIS — R31.1 BENIGN ESSENTIAL MICROSCOPIC HEMATURIA: ICD-10-CM

## 2024-08-26 DIAGNOSIS — R80.1 PERSISTENT PROTEINURIA: ICD-10-CM

## 2024-08-26 DIAGNOSIS — E11.9 TYPE 2 DIABETES MELLITUS WITHOUT COMPLICATION, WITHOUT LONG-TERM CURRENT USE OF INSULIN (HCC): ICD-10-CM

## 2024-08-26 LAB
ALBUMIN SERPL-MCNC: 3.6 GM/DL (ref 3.4–5)
ANION GAP SERPL CALCULATED.3IONS-SCNC: 9 MMOL/L (ref 7–16)
BASOPHILS ABSOLUTE: 0 K/CU MM
BASOPHILS RELATIVE PERCENT: 0.1 % (ref 0–1)
BUN SERPL-MCNC: 22 MG/DL (ref 6–23)
CALCIUM SERPL-MCNC: 9.1 MG/DL (ref 8.3–10.6)
CHLORIDE BLD-SCNC: 97 MMOL/L (ref 99–110)
CO2: 32 MMOL/L (ref 21–32)
CREAT SERPL-MCNC: 2.4 MG/DL (ref 0.9–1.3)
DIFFERENTIAL TYPE: ABNORMAL
EOSINOPHILS ABSOLUTE: 0.1 K/CU MM
EOSINOPHILS RELATIVE PERCENT: 1.1 % (ref 0–3)
GFR, ESTIMATED: 28 ML/MIN/1.73M2
GLUCOSE SERPL-MCNC: 89 MG/DL (ref 70–99)
HCT VFR BLD CALC: 28.9 % (ref 42–52)
HEMOGLOBIN: 8.4 GM/DL (ref 13.5–18)
IMMATURE NEUTROPHIL %: 0.3 % (ref 0–0.43)
LYMPHOCYTES ABSOLUTE: 1.2 K/CU MM
LYMPHOCYTES RELATIVE PERCENT: 16.9 % (ref 24–44)
MAGNESIUM: 2.1 MG/DL (ref 1.8–2.4)
MCH RBC QN AUTO: 29.8 PG (ref 27–31)
MCHC RBC AUTO-ENTMCNC: 29.1 % (ref 32–36)
MCV RBC AUTO: 102.5 FL (ref 78–100)
MONOCYTES ABSOLUTE: 0.7 K/CU MM
MONOCYTES RELATIVE PERCENT: 9.5 % (ref 0–4)
NEUTROPHILS ABSOLUTE: 5.2 K/CU MM
NEUTROPHILS RELATIVE PERCENT: 72.1 % (ref 36–66)
NUCLEATED RBC %: 0 %
PDW BLD-RTO: 16.5 % (ref 11.7–14.9)
PHOSPHORUS: 3 MG/DL (ref 2.5–4.9)
PLATELET # BLD: 146 K/CU MM (ref 140–440)
PMV BLD AUTO: 10.3 FL (ref 7.5–11.1)
POTASSIUM SERPL-SCNC: 4.3 MMOL/L (ref 3.5–5.1)
RBC # BLD: 2.82 M/CU MM (ref 4.6–6.2)
SODIUM BLD-SCNC: 138 MMOL/L (ref 135–145)
TOTAL IMMATURE NEUTOROPHIL: 0.02 K/CU MM
TOTAL NUCLEATED RBC: 0 K/CU MM
WBC # BLD: 7.2 K/CU MM (ref 4–10.5)

## 2024-08-26 PROCEDURE — 36415 COLL VENOUS BLD VENIPUNCTURE: CPT

## 2024-08-26 PROCEDURE — 83735 ASSAY OF MAGNESIUM: CPT

## 2024-08-26 PROCEDURE — 85025 COMPLETE CBC W/AUTO DIFF WBC: CPT

## 2024-08-26 PROCEDURE — 80048 BASIC METABOLIC PNL TOTAL CA: CPT

## 2024-08-26 PROCEDURE — 84100 ASSAY OF PHOSPHORUS: CPT

## 2024-08-26 PROCEDURE — 82040 ASSAY OF SERUM ALBUMIN: CPT

## 2024-08-27 ENCOUNTER — HOSPITAL ENCOUNTER (OUTPATIENT)
Age: 74
Setting detail: SPECIMEN
Discharge: HOME | End: 2024-08-27
Payer: MEDICARE

## 2024-08-27 LAB
BACTERIA: ABNORMAL /HPF
BILIRUBIN, URINE: NEGATIVE MG/DL
BLOOD, URINE: ABNORMAL
CLARITY, UA: CLEAR
COLOR, UA: YELLOW
CREATININE URINE: 75.7 MG/DL (ref 39–259)
GLUCOSE URINE: NEGATIVE MG/DL
HYALINE CASTS: 2 /LPF
KETONES, URINE: NEGATIVE MG/DL
LEUKOCYTE ESTERASE, URINE: NEGATIVE
MUCUS: ABNORMAL HPF
NITRITE URINE, QUANTITATIVE: NEGATIVE
PH, URINE: 6 (ref 5–8)
PROT/CREAT RATIO, UR: 0.5
PROTEIN UA: ABNORMAL MG/DL
RBC URINE: 1 /HPF (ref 0–3)
SPECIFIC GRAVITY UA: 1.01 (ref 1–1.03)
SQUAMOUS EPITHELIAL: <1 /HPF
TRICHOMONAS: ABNORMAL /HPF
URINE TOTAL PROTEIN: 34.9 MG/DL
UROBILINOGEN, URINE: 0.2 MG/DL (ref 0.2–1)
WBC UA: 1 /HPF (ref 0–2)

## 2024-08-27 PROCEDURE — 82570 ASSAY OF URINE CREATININE: CPT

## 2024-08-27 PROCEDURE — 84156 ASSAY OF PROTEIN URINE: CPT

## 2024-08-27 PROCEDURE — 81001 URINALYSIS AUTO W/SCOPE: CPT

## 2024-09-24 ENCOUNTER — TELEPHONE (OUTPATIENT)
Dept: CARDIOLOGY CLINIC | Age: 74
End: 2024-09-24

## 2024-10-31 PROBLEM — N18.32 ANEMIA DUE TO STAGE 3B CHRONIC KIDNEY DISEASE (HCC): Status: ACTIVE | Noted: 2018-06-06

## 2024-10-31 PROBLEM — D63.1 ANEMIA DUE TO STAGE 3B CHRONIC KIDNEY DISEASE (HCC): Status: ACTIVE | Noted: 2018-06-06

## 2024-11-04 ENCOUNTER — TELEPHONE (OUTPATIENT)
Dept: GASTROENTEROLOGY | Age: 74
End: 2024-11-04

## 2024-11-11 ENCOUNTER — TELEPHONE (OUTPATIENT)
Dept: GASTROENTEROLOGY | Age: 74
End: 2024-11-11

## 2024-11-11 NOTE — TELEPHONE ENCOUNTER
Spoke with pt wife brook regarding referral for anemia. Made appt for pt to see shanda on 12/17/24 @10am

## 2025-01-07 ENCOUNTER — TELEPHONE (OUTPATIENT)
Dept: CARDIOLOGY CLINIC | Age: 75
End: 2025-01-07

## 2025-01-07 NOTE — TELEPHONE ENCOUNTER
LM for Patients wife Tamiko, to reschedule office visit with Zara Mcdaniel NP that patient had scheduled for today 1/7/25.

## (undated) DEVICE — KIT DIL 8/12/16/20/24FR NDL 18GA GWIRE L180CM DIA0.035IN

## (undated) DEVICE — CATHETER ANGIO 6FR L110CM ID0.056IN VENT PIG CRV ROBUST

## (undated) DEVICE — PERCUTANEOUS ENTRY THINWALL NEEDLE  ONE-PART: Brand: COOK

## (undated) DEVICE — GUIDEWIRE VASC L180CM DIA0.035IN L7CM DIA3MM J TIP PTFE S

## (undated) DEVICE — INTRODUCER SHTH 6FR L12CM DIA0.038IN STD HEMSTAS CLOSE TOL

## (undated) DEVICE — GUIDEWIRE VASC L260CM DIA0.035IN RAD 3MM J TIP L7CM PTFE

## (undated) DEVICE — ENDOSCOPY KIT: Brand: MEDLINE INDUSTRIES, INC.

## (undated) DEVICE — CHECK-FLO PERFORMER INTRODUCER: Brand: PERFORMER

## (undated) DEVICE — 1 X VERSACROSS LARGE ACCESS TRANSSEPTAL DILATOR (INCLUDING 1 X J-TIP MECHANICAL GUIDEWIRE); 1 X VERSACROSS RF WIRE (INCLUDING 1 X CONNECTOR CABLE (SINGLE USE)); 1 X DISPERSIVE ELECTRODE: Brand: VERSACROSS LARGE ACCESS SOLUTION

## (undated) DEVICE — Device

## (undated) DEVICE — ACCESS SHEATH WITH DILATOR: Brand: WATCHMAN FXD CURVE™ ACCESS SYSTEM

## (undated) DEVICE — PERCLOSE™ PROSTYLE™ SUTURE-MEDIATED CLOSURE AND REPAIR SYSTEM: Brand: PERCLOSE™ PROSTYLE™

## (undated) DEVICE — ANGIOGRAPHY KIT CUST MANIFOLD

## (undated) DEVICE — FORCEPS BX L240CM JAW DIA2.8MM L CAP W/ NDL MIC MESH TOOTH